# Patient Record
Sex: FEMALE | Race: WHITE | Employment: OTHER
[De-identification: names, ages, dates, MRNs, and addresses within clinical notes are randomized per-mention and may not be internally consistent; named-entity substitution may affect disease eponyms.]

---

## 2017-02-01 DIAGNOSIS — E78.5 HYPERLIPIDEMIA: ICD-10-CM

## 2017-02-01 RX ORDER — ATORVASTATIN CALCIUM 10 MG/1
TABLET, FILM COATED ORAL
Qty: 30 TABLET | Refills: 2 | Status: SHIPPED | OUTPATIENT
Start: 2017-02-01 | End: 2017-02-16 | Stop reason: SDUPTHER

## 2017-02-16 ENCOUNTER — OFFICE VISIT (OUTPATIENT)
Facility: CLINIC | Age: 67
End: 2017-02-16

## 2017-02-16 VITALS
TEMPERATURE: 98 F | DIASTOLIC BLOOD PRESSURE: 78 MMHG | SYSTOLIC BLOOD PRESSURE: 140 MMHG | BODY MASS INDEX: 30.4 KG/M2 | HEART RATE: 77 BPM | WEIGHT: 161 LBS | HEIGHT: 61 IN | OXYGEN SATURATION: 98 %

## 2017-02-16 DIAGNOSIS — Z23 NEED FOR PNEUMOCOCCAL VACCINATION: ICD-10-CM

## 2017-02-16 DIAGNOSIS — E78.00 PURE HYPERCHOLESTEROLEMIA: ICD-10-CM

## 2017-02-16 DIAGNOSIS — E78.5 HYPERLIPIDEMIA, UNSPECIFIED HYPERLIPIDEMIA TYPE: ICD-10-CM

## 2017-02-16 DIAGNOSIS — Z23 NEED FOR SHINGLES VACCINE: ICD-10-CM

## 2017-02-16 DIAGNOSIS — H66.002 ACUTE SUPPURATIVE OTITIS MEDIA OF LEFT EAR WITHOUT SPONTANEOUS RUPTURE OF TYMPANIC MEMBRANE, RECURRENCE NOT SPECIFIED: Primary | ICD-10-CM

## 2017-02-16 PROCEDURE — 1123F ACP DISCUSS/DSCN MKR DOCD: CPT | Performed by: NURSE PRACTITIONER

## 2017-02-16 PROCEDURE — 1090F PRES/ABSN URINE INCON ASSESS: CPT | Performed by: NURSE PRACTITIONER

## 2017-02-16 PROCEDURE — G0009 ADMIN PNEUMOCOCCAL VACCINE: HCPCS | Performed by: NURSE PRACTITIONER

## 2017-02-16 PROCEDURE — 4040F PNEUMOC VAC/ADMIN/RCVD: CPT | Performed by: NURSE PRACTITIONER

## 2017-02-16 PROCEDURE — 99214 OFFICE O/P EST MOD 30 MIN: CPT | Performed by: NURSE PRACTITIONER

## 2017-02-16 PROCEDURE — G8400 PT W/DXA NO RESULTS DOC: HCPCS | Performed by: NURSE PRACTITIONER

## 2017-02-16 PROCEDURE — 90670 PCV13 VACCINE IM: CPT | Performed by: NURSE PRACTITIONER

## 2017-02-16 PROCEDURE — G8599 NO ASA/ANTIPLAT THER USE RNG: HCPCS | Performed by: NURSE PRACTITIONER

## 2017-02-16 PROCEDURE — G8484 FLU IMMUNIZE NO ADMIN: HCPCS | Performed by: NURSE PRACTITIONER

## 2017-02-16 PROCEDURE — G8427 DOCREV CUR MEDS BY ELIG CLIN: HCPCS | Performed by: NURSE PRACTITIONER

## 2017-02-16 PROCEDURE — G8417 CALC BMI ABV UP PARAM F/U: HCPCS | Performed by: NURSE PRACTITIONER

## 2017-02-16 PROCEDURE — 3014F SCREEN MAMMO DOC REV: CPT | Performed by: NURSE PRACTITIONER

## 2017-02-16 PROCEDURE — 3017F COLORECTAL CA SCREEN DOC REV: CPT | Performed by: NURSE PRACTITIONER

## 2017-02-16 PROCEDURE — 1036F TOBACCO NON-USER: CPT | Performed by: NURSE PRACTITIONER

## 2017-02-16 RX ORDER — AMOXICILLIN 500 MG/1
500 CAPSULE ORAL 3 TIMES DAILY
Qty: 30 CAPSULE | Refills: 0 | Status: SHIPPED | OUTPATIENT
Start: 2017-02-16 | End: 2017-04-07 | Stop reason: SDUPTHER

## 2017-02-16 RX ORDER — ATORVASTATIN CALCIUM 10 MG/1
TABLET, FILM COATED ORAL
Qty: 90 TABLET | Refills: 1 | Status: SHIPPED | OUTPATIENT
Start: 2017-02-16 | End: 2017-11-03 | Stop reason: SDUPTHER

## 2017-02-16 RX ORDER — ATORVASTATIN CALCIUM 10 MG/1
10 TABLET, FILM COATED ORAL DAILY
Qty: 90 TABLET | Refills: 0 | Status: CANCELLED | OUTPATIENT
Start: 2017-02-16 | End: 2017-05-17

## 2017-02-16 ASSESSMENT — ENCOUNTER SYMPTOMS
RHINORRHEA: 0
SORE THROAT: 0
COUGH: 1
SINUS PRESSURE: 1

## 2017-03-23 RX ORDER — OMEPRAZOLE 20 MG/1
CAPSULE, DELAYED RELEASE ORAL
Qty: 90 CAPSULE | Refills: 2 | Status: SHIPPED | OUTPATIENT
Start: 2017-03-23 | End: 2017-12-21 | Stop reason: SDUPTHER

## 2017-05-09 RX ORDER — MESALAMINE 375 MG/1
CAPSULE, EXTENDED RELEASE ORAL
Qty: 120 CAPSULE | Refills: 0 | Status: SHIPPED | OUTPATIENT
Start: 2017-05-09 | End: 2017-05-24 | Stop reason: SDUPTHER

## 2017-05-15 ENCOUNTER — HOSPITAL ENCOUNTER (OUTPATIENT)
Age: 67
Setting detail: SPECIMEN
Discharge: HOME | End: 2017-05-15
Payer: MEDICARE

## 2017-05-15 DIAGNOSIS — E78.5 HYPERLIPIDEMIA, UNSPECIFIED HYPERLIPIDEMIA TYPE: ICD-10-CM

## 2017-05-15 DIAGNOSIS — R53.83 FATIGUE, UNSPECIFIED TYPE: ICD-10-CM

## 2017-05-15 LAB
ALBUMIN SERPL-MCNC: 4 G/DL (ref 3.5–5.2)
ALBUMIN/GLOBULIN RATIO: 1.3 (ref 1–2.5)
ALP BLD-CCNC: 99 U/L (ref 35–104)
ALT SERPL-CCNC: 17 U/L (ref 5–33)
ANION GAP SERPL CALCULATED.3IONS-SCNC: 11 MMOL/L (ref 9–17)
AST SERPL-CCNC: 18 U/L
BILIRUB SERPL-MCNC: 0.55 MG/DL (ref 0.3–1.2)
BUN BLDV-MCNC: 18 MG/DL (ref 8–23)
BUN/CREAT BLD: ABNORMAL (ref 9–20)
CALCIUM SERPL-MCNC: 9.1 MG/DL (ref 8.6–10.4)
CHLORIDE BLD-SCNC: 104 MMOL/L (ref 98–107)
CHOLESTEROL/HDL RATIO: 2.7
CHOLESTEROL: 175 MG/DL
CO2: 25 MMOL/L (ref 20–31)
CREAT SERPL-MCNC: 0.62 MG/DL (ref 0.5–0.9)
GFR AFRICAN AMERICAN: >60 ML/MIN
GFR NON-AFRICAN AMERICAN: >60 ML/MIN
GFR SERPL CREATININE-BSD FRML MDRD: ABNORMAL ML/MIN/{1.73_M2}
GFR SERPL CREATININE-BSD FRML MDRD: ABNORMAL ML/MIN/{1.73_M2}
GLUCOSE BLD-MCNC: 111 MG/DL (ref 70–99)
HCT VFR BLD CALC: 44.4 % (ref 36–46)
HDLC SERPL-MCNC: 64 MG/DL
HEMOGLOBIN: 14.7 G/DL (ref 12–16)
LDL CHOLESTEROL: 90 MG/DL (ref 0–130)
MCH RBC QN AUTO: 29 PG (ref 26–34)
MCHC RBC AUTO-ENTMCNC: 33.1 G/DL (ref 31–37)
MCV RBC AUTO: 87.4 FL (ref 80–100)
PDW BLD-RTO: 14.1 % (ref 12.5–15.4)
PLATELET # BLD: 248 K/UL (ref 140–450)
PMV BLD AUTO: 9 FL (ref 6–12)
POTASSIUM SERPL-SCNC: 4.3 MMOL/L (ref 3.7–5.3)
RBC # BLD: 5.08 M/UL (ref 4–5.2)
SODIUM BLD-SCNC: 140 MMOL/L (ref 135–144)
THYROXINE, FREE: 1.04 NG/DL (ref 0.93–1.7)
TOTAL PROTEIN: 7.1 G/DL (ref 6.4–8.3)
TRIGL SERPL-MCNC: 106 MG/DL
TSH SERPL DL<=0.05 MIU/L-ACNC: 1.83 MIU/L (ref 0.3–5)
VLDLC SERPL CALC-MCNC: NORMAL MG/DL (ref 1–30)
WBC # BLD: 6.7 K/UL (ref 3.5–11)

## 2017-05-24 ENCOUNTER — OFFICE VISIT (OUTPATIENT)
Dept: GASTROENTEROLOGY | Age: 67
End: 2017-05-24
Payer: MEDICARE

## 2017-05-24 VITALS
HEIGHT: 61 IN | TEMPERATURE: 98 F | BODY MASS INDEX: 30.15 KG/M2 | OXYGEN SATURATION: 98 % | DIASTOLIC BLOOD PRESSURE: 82 MMHG | HEART RATE: 64 BPM | SYSTOLIC BLOOD PRESSURE: 150 MMHG | WEIGHT: 159.7 LBS

## 2017-05-24 DIAGNOSIS — K21.9 GASTROESOPHAGEAL REFLUX DISEASE WITHOUT ESOPHAGITIS: ICD-10-CM

## 2017-05-24 DIAGNOSIS — K51.90 ULCERATIVE COLITIS WITHOUT COMPLICATIONS, UNSPECIFIED LOCATION (HCC): Primary | ICD-10-CM

## 2017-05-24 PROCEDURE — 1123F ACP DISCUSS/DSCN MKR DOCD: CPT | Performed by: INTERNAL MEDICINE

## 2017-05-24 PROCEDURE — G8400 PT W/DXA NO RESULTS DOC: HCPCS | Performed by: INTERNAL MEDICINE

## 2017-05-24 PROCEDURE — 1036F TOBACCO NON-USER: CPT | Performed by: INTERNAL MEDICINE

## 2017-05-24 PROCEDURE — G8427 DOCREV CUR MEDS BY ELIG CLIN: HCPCS | Performed by: INTERNAL MEDICINE

## 2017-05-24 PROCEDURE — 4040F PNEUMOC VAC/ADMIN/RCVD: CPT | Performed by: INTERNAL MEDICINE

## 2017-05-24 PROCEDURE — 3017F COLORECTAL CA SCREEN DOC REV: CPT | Performed by: INTERNAL MEDICINE

## 2017-05-24 PROCEDURE — 3014F SCREEN MAMMO DOC REV: CPT | Performed by: INTERNAL MEDICINE

## 2017-05-24 PROCEDURE — 99214 OFFICE O/P EST MOD 30 MIN: CPT | Performed by: INTERNAL MEDICINE

## 2017-05-24 PROCEDURE — 1090F PRES/ABSN URINE INCON ASSESS: CPT | Performed by: INTERNAL MEDICINE

## 2017-05-24 PROCEDURE — G8599 NO ASA/ANTIPLAT THER USE RNG: HCPCS | Performed by: INTERNAL MEDICINE

## 2017-05-24 PROCEDURE — G8417 CALC BMI ABV UP PARAM F/U: HCPCS | Performed by: INTERNAL MEDICINE

## 2017-05-24 RX ORDER — MESALAMINE 0.38 G/1
CAPSULE, EXTENDED RELEASE ORAL
Qty: 360 CAPSULE | Refills: 3 | Status: SHIPPED | OUTPATIENT
Start: 2017-05-24 | End: 2018-06-11 | Stop reason: SDUPTHER

## 2017-05-24 ASSESSMENT — ENCOUNTER SYMPTOMS
GASTROINTESTINAL NEGATIVE: 1
CONSTIPATION: 0
DIARRHEA: 0
SHORTNESS OF BREATH: 0
SORE THROAT: 0
ABDOMINAL PAIN: 0
COUGH: 0
SINUS PRESSURE: 0
ABDOMINAL DISTENTION: 0
VOICE CHANGE: 0
BLOOD IN STOOL: 0
ANAL BLEEDING: 0
NAUSEA: 0
WHEEZING: 0
CHOKING: 0
FACIAL SWELLING: 0
RESPIRATORY NEGATIVE: 1
BACK PAIN: 0
TROUBLE SWALLOWING: 0
RHINORRHEA: 0
COLOR CHANGE: 0
VOMITING: 0
RECTAL PAIN: 0

## 2017-06-28 ENCOUNTER — OFFICE VISIT (OUTPATIENT)
Dept: PODIATRY | Age: 67
End: 2017-06-28
Payer: MEDICARE

## 2017-06-28 VITALS
WEIGHT: 150 LBS | SYSTOLIC BLOOD PRESSURE: 128 MMHG | BODY MASS INDEX: 27.6 KG/M2 | HEIGHT: 62 IN | DIASTOLIC BLOOD PRESSURE: 86 MMHG | HEART RATE: 83 BPM

## 2017-06-28 DIAGNOSIS — R26.2 PATIENT UNABLE TO WALK 150 FEET: Primary | ICD-10-CM

## 2017-06-28 DIAGNOSIS — M79.671 PAIN IN BOTH FEET: ICD-10-CM

## 2017-06-28 DIAGNOSIS — M79.672 PAIN IN BOTH FEET: ICD-10-CM

## 2017-06-28 DIAGNOSIS — M62.89 POSTURAL FATIGUE: ICD-10-CM

## 2017-06-28 PROCEDURE — G8427 DOCREV CUR MEDS BY ELIG CLIN: HCPCS | Performed by: PODIATRIST

## 2017-06-28 PROCEDURE — 1123F ACP DISCUSS/DSCN MKR DOCD: CPT | Performed by: PODIATRIST

## 2017-06-28 PROCEDURE — 3017F COLORECTAL CA SCREEN DOC REV: CPT | Performed by: PODIATRIST

## 2017-06-28 PROCEDURE — G8599 NO ASA/ANTIPLAT THER USE RNG: HCPCS | Performed by: PODIATRIST

## 2017-06-28 PROCEDURE — G8400 PT W/DXA NO RESULTS DOC: HCPCS | Performed by: PODIATRIST

## 2017-06-28 PROCEDURE — G8417 CALC BMI ABV UP PARAM F/U: HCPCS | Performed by: PODIATRIST

## 2017-06-28 PROCEDURE — 1036F TOBACCO NON-USER: CPT | Performed by: PODIATRIST

## 2017-06-28 PROCEDURE — 99203 OFFICE O/P NEW LOW 30 MIN: CPT | Performed by: PODIATRIST

## 2017-06-28 PROCEDURE — 1090F PRES/ABSN URINE INCON ASSESS: CPT | Performed by: PODIATRIST

## 2017-06-28 PROCEDURE — 4040F PNEUMOC VAC/ADMIN/RCVD: CPT | Performed by: PODIATRIST

## 2017-06-28 PROCEDURE — 3014F SCREEN MAMMO DOC REV: CPT | Performed by: PODIATRIST

## 2017-06-28 ASSESSMENT — ENCOUNTER SYMPTOMS
CONSTIPATION: 0
DIARRHEA: 0
NAUSEA: 0
VOMITING: 0
COLOR CHANGE: 0

## 2017-12-04 PROBLEM — I10 HYPERTENSION, ESSENTIAL, BENIGN: Status: ACTIVE | Noted: 2017-12-04

## 2017-12-21 RX ORDER — OMEPRAZOLE 20 MG/1
CAPSULE, DELAYED RELEASE ORAL
Qty: 90 CAPSULE | Refills: 2 | Status: SHIPPED | OUTPATIENT
Start: 2017-12-21 | End: 2018-09-04 | Stop reason: SDUPTHER

## 2018-01-05 ENCOUNTER — HOSPITAL ENCOUNTER (OUTPATIENT)
Dept: WOMENS IMAGING | Age: 68
Discharge: HOME | End: 2018-01-05
Payer: MEDICARE

## 2018-01-05 DIAGNOSIS — Z12.31 ENCOUNTER FOR SCREENING MAMMOGRAM FOR BREAST CANCER: ICD-10-CM

## 2018-01-05 PROCEDURE — 77063 BREAST TOMOSYNTHESIS BI: CPT

## 2018-03-20 ENCOUNTER — HOSPITAL ENCOUNTER (OUTPATIENT)
Age: 68
Setting detail: SPECIMEN
Discharge: HOME | End: 2018-03-20
Payer: MEDICARE

## 2018-03-20 DIAGNOSIS — E55.9 VITAMIN D DEFICIENCY: ICD-10-CM

## 2018-03-20 DIAGNOSIS — E78.5 HYPERLIPIDEMIA, UNSPECIFIED HYPERLIPIDEMIA TYPE: ICD-10-CM

## 2018-03-20 DIAGNOSIS — I10 HYPERTENSION, ESSENTIAL, BENIGN: ICD-10-CM

## 2018-03-20 LAB
ALBUMIN SERPL-MCNC: 3.9 G/DL (ref 3.5–5.2)
ALBUMIN/GLOBULIN RATIO: 1.4 (ref 1–2.5)
ALP BLD-CCNC: 104 U/L (ref 35–104)
ALT SERPL-CCNC: 19 U/L (ref 5–33)
ANION GAP SERPL CALCULATED.3IONS-SCNC: 14 MMOL/L (ref 9–17)
AST SERPL-CCNC: 17 U/L
BILIRUB SERPL-MCNC: 0.67 MG/DL (ref 0.3–1.2)
BUN BLDV-MCNC: 17 MG/DL (ref 8–23)
BUN/CREAT BLD: ABNORMAL (ref 9–20)
CALCIUM SERPL-MCNC: 9 MG/DL (ref 8.6–10.4)
CHLORIDE BLD-SCNC: 106 MMOL/L (ref 98–107)
CHOLESTEROL/HDL RATIO: 2.6
CHOLESTEROL: 139 MG/DL
CO2: 24 MMOL/L (ref 20–31)
CREAT SERPL-MCNC: 0.63 MG/DL (ref 0.5–0.9)
GFR AFRICAN AMERICAN: >60 ML/MIN
GFR NON-AFRICAN AMERICAN: >60 ML/MIN
GFR SERPL CREATININE-BSD FRML MDRD: ABNORMAL ML/MIN/{1.73_M2}
GFR SERPL CREATININE-BSD FRML MDRD: ABNORMAL ML/MIN/{1.73_M2}
GLUCOSE BLD-MCNC: 140 MG/DL (ref 70–99)
HCT VFR BLD CALC: 43.9 % (ref 36.3–47.1)
HDLC SERPL-MCNC: 54 MG/DL
HEMOGLOBIN: 14.2 G/DL (ref 11.9–15.1)
LDL CHOLESTEROL: 63 MG/DL (ref 0–130)
MCH RBC QN AUTO: 28.5 PG (ref 25.2–33.5)
MCHC RBC AUTO-ENTMCNC: 32.3 G/DL (ref 28.4–34.8)
MCV RBC AUTO: 88 FL (ref 82.6–102.9)
NRBC AUTOMATED: 0 PER 100 WBC
PDW BLD-RTO: 12.4 % (ref 11.8–14.4)
PLATELET # BLD: 225 K/UL (ref 138–453)
PMV BLD AUTO: 11.5 FL (ref 8.1–13.5)
POTASSIUM SERPL-SCNC: 4.2 MMOL/L (ref 3.7–5.3)
RBC # BLD: 4.99 M/UL (ref 3.95–5.11)
SODIUM BLD-SCNC: 144 MMOL/L (ref 135–144)
TOTAL PROTEIN: 6.6 G/DL (ref 6.4–8.3)
TRIGL SERPL-MCNC: 111 MG/DL
VITAMIN D 25-HYDROXY: 15.9 NG/ML (ref 30–100)
VLDLC SERPL CALC-MCNC: NORMAL MG/DL (ref 1–30)
WBC # BLD: 5.7 K/UL (ref 3.5–11.3)

## 2018-03-22 ENCOUNTER — HOSPITAL ENCOUNTER (OUTPATIENT)
Dept: SLEEP CENTER | Age: 68
Discharge: HOME | End: 2018-03-24
Payer: MEDICARE

## 2018-03-22 DIAGNOSIS — R05.9 COUGH: ICD-10-CM

## 2018-03-22 DIAGNOSIS — R06.83 SNORING: ICD-10-CM

## 2018-03-22 PROCEDURE — 95810 POLYSOM 6/> YRS 4/> PARAM: CPT

## 2018-03-22 ASSESSMENT — SLEEP AND FATIGUE QUESTIONNAIRES
HOW LIKELY ARE YOU TO NOD OFF OR FALL ASLEEP WHILE SITTING QUIETLY AFTER LUNCH WITHOUT ALCOHOL: 1
HOW LIKELY ARE YOU TO NOD OFF OR FALL ASLEEP WHILE WATCHING TV: 1
HOW LIKELY ARE YOU TO NOD OFF OR FALL ASLEEP WHILE SITTING AND READING: 1
HOW LIKELY ARE YOU TO NOD OFF OR FALL ASLEEP WHILE SITTING AND TALKING TO SOMEONE: 0
HOW LIKELY ARE YOU TO NOD OFF OR FALL ASLEEP WHEN YOU ARE A PASSENGER IN A CAR FOR AN HOUR WITHOUT A BREAK: 2
ESS TOTAL SCORE: 6
HOW LIKELY ARE YOU TO NOD OFF OR FALL ASLEEP WHILE SITTING INACTIVE IN A PUBLIC PLACE: 0
HOW LIKELY ARE YOU TO NOD OFF OR FALL ASLEEP IN A CAR, WHILE STOPPED FOR A FEW MINUTES IN TRAFFIC: 0
HOW LIKELY ARE YOU TO NOD OFF OR FALL ASLEEP WHILE LYING DOWN TO REST IN THE AFTERNOON WHEN CIRCUMSTANCES PERMIT: 1

## 2018-03-23 VITALS
DIASTOLIC BLOOD PRESSURE: 68 MMHG | HEART RATE: 73 BPM | HEIGHT: 61 IN | WEIGHT: 164 LBS | RESPIRATION RATE: 16 BRPM | SYSTOLIC BLOOD PRESSURE: 126 MMHG | BODY MASS INDEX: 30.96 KG/M2

## 2018-04-18 ENCOUNTER — TELEPHONE (OUTPATIENT)
Dept: GASTROENTEROLOGY | Age: 68
End: 2018-04-18

## 2018-05-29 ENCOUNTER — OFFICE VISIT (OUTPATIENT)
Dept: GASTROENTEROLOGY | Age: 68
End: 2018-05-29
Payer: MEDICARE

## 2018-05-29 VITALS
WEIGHT: 167.1 LBS | DIASTOLIC BLOOD PRESSURE: 73 MMHG | SYSTOLIC BLOOD PRESSURE: 121 MMHG | BODY MASS INDEX: 30.75 KG/M2 | OXYGEN SATURATION: 97 % | HEART RATE: 70 BPM | HEIGHT: 62 IN

## 2018-05-29 DIAGNOSIS — K51.90 ULCERATIVE COLITIS WITHOUT COMPLICATIONS, UNSPECIFIED LOCATION (HCC): ICD-10-CM

## 2018-05-29 DIAGNOSIS — K21.9 GASTROESOPHAGEAL REFLUX DISEASE WITHOUT ESOPHAGITIS: Primary | ICD-10-CM

## 2018-05-29 PROCEDURE — 1090F PRES/ABSN URINE INCON ASSESS: CPT | Performed by: INTERNAL MEDICINE

## 2018-05-29 PROCEDURE — G8400 PT W/DXA NO RESULTS DOC: HCPCS | Performed by: INTERNAL MEDICINE

## 2018-05-29 PROCEDURE — G8599 NO ASA/ANTIPLAT THER USE RNG: HCPCS | Performed by: INTERNAL MEDICINE

## 2018-05-29 PROCEDURE — G8427 DOCREV CUR MEDS BY ELIG CLIN: HCPCS | Performed by: INTERNAL MEDICINE

## 2018-05-29 PROCEDURE — 1123F ACP DISCUSS/DSCN MKR DOCD: CPT | Performed by: INTERNAL MEDICINE

## 2018-05-29 PROCEDURE — 3017F COLORECTAL CA SCREEN DOC REV: CPT | Performed by: INTERNAL MEDICINE

## 2018-05-29 PROCEDURE — 4040F PNEUMOC VAC/ADMIN/RCVD: CPT | Performed by: INTERNAL MEDICINE

## 2018-05-29 PROCEDURE — G8417 CALC BMI ABV UP PARAM F/U: HCPCS | Performed by: INTERNAL MEDICINE

## 2018-05-29 PROCEDURE — 99215 OFFICE O/P EST HI 40 MIN: CPT | Performed by: INTERNAL MEDICINE

## 2018-05-29 PROCEDURE — 1036F TOBACCO NON-USER: CPT | Performed by: INTERNAL MEDICINE

## 2018-05-29 ASSESSMENT — ENCOUNTER SYMPTOMS
COLOR CHANGE: 0
CHOKING: 0
ANAL BLEEDING: 0
WHEEZING: 0
BACK PAIN: 0
RESPIRATORY NEGATIVE: 1
RHINORRHEA: 0
FACIAL SWELLING: 0
ABDOMINAL PAIN: 0
DIARRHEA: 0
CONSTIPATION: 0
VOICE CHANGE: 0
ABDOMINAL DISTENTION: 0
VOMITING: 0
RECTAL PAIN: 0
COUGH: 0
GASTROINTESTINAL NEGATIVE: 1
SINUS PAIN: 0
TROUBLE SWALLOWING: 0
SINUS PRESSURE: 0
SHORTNESS OF BREATH: 0
BLOOD IN STOOL: 0
NAUSEA: 0
SORE THROAT: 0

## 2018-06-14 ENCOUNTER — TELEPHONE (OUTPATIENT)
Dept: GASTROENTEROLOGY | Age: 68
End: 2018-06-14

## 2018-06-19 RX ORDER — MESALAMINE 375 MG/1
CAPSULE, EXTENDED RELEASE ORAL
Qty: 360 CAPSULE | Refills: 0 | Status: SHIPPED | OUTPATIENT
Start: 2018-06-19 | End: 2018-09-15 | Stop reason: SDUPTHER

## 2018-06-22 ENCOUNTER — HOSPITAL ENCOUNTER (OUTPATIENT)
Age: 68
Setting detail: SPECIMEN
Discharge: HOME | End: 2018-06-22
Payer: MEDICARE

## 2018-06-22 ENCOUNTER — HOSPITAL ENCOUNTER (OUTPATIENT)
Facility: CLINIC | Age: 68
Discharge: HOME | End: 2018-06-24
Payer: MEDICARE

## 2018-06-22 ENCOUNTER — HOSPITAL ENCOUNTER (OUTPATIENT)
Dept: GENERAL RADIOLOGY | Facility: CLINIC | Age: 68
Discharge: HOME | End: 2018-06-24
Payer: MEDICARE

## 2018-06-22 DIAGNOSIS — R05.3 PERSISTENT COUGH: ICD-10-CM

## 2018-06-22 DIAGNOSIS — R73.9 ELEVATED BLOOD SUGAR: ICD-10-CM

## 2018-06-22 DIAGNOSIS — J98.01 BRONCHOSPASM: ICD-10-CM

## 2018-06-22 DIAGNOSIS — J20.9 ACUTE BRONCHITIS, UNSPECIFIED ORGANISM: ICD-10-CM

## 2018-06-22 LAB
ESTIMATED AVERAGE GLUCOSE: 140 MG/DL
HBA1C MFR BLD: 6.5 % (ref 4–6)

## 2018-06-22 PROCEDURE — 71046 X-RAY EXAM CHEST 2 VIEWS: CPT

## 2018-07-10 ENCOUNTER — OFFICE VISIT (OUTPATIENT)
Dept: PULMONOLOGY | Age: 68
End: 2018-07-10
Payer: MEDICARE

## 2018-07-10 VITALS
WEIGHT: 166 LBS | HEART RATE: 88 BPM | OXYGEN SATURATION: 97 % | RESPIRATION RATE: 16 BRPM | HEIGHT: 61 IN | BODY MASS INDEX: 31.34 KG/M2 | DIASTOLIC BLOOD PRESSURE: 80 MMHG | SYSTOLIC BLOOD PRESSURE: 122 MMHG

## 2018-07-10 DIAGNOSIS — J98.11 ATELECTASIS: ICD-10-CM

## 2018-07-10 DIAGNOSIS — K21.9 GASTROESOPHAGEAL REFLUX DISEASE, ESOPHAGITIS PRESENCE NOT SPECIFIED: ICD-10-CM

## 2018-07-10 DIAGNOSIS — R05.3 CHRONIC COUGH: ICD-10-CM

## 2018-07-10 DIAGNOSIS — J45.30 MILD PERSISTENT ASTHMA, UNSPECIFIED WHETHER COMPLICATED: Primary | ICD-10-CM

## 2018-07-10 DIAGNOSIS — J30.1 CHRONIC SEASONAL ALLERGIC RHINITIS DUE TO POLLEN: ICD-10-CM

## 2018-07-10 PROCEDURE — 1036F TOBACCO NON-USER: CPT | Performed by: INTERNAL MEDICINE

## 2018-07-10 PROCEDURE — 1090F PRES/ABSN URINE INCON ASSESS: CPT | Performed by: INTERNAL MEDICINE

## 2018-07-10 PROCEDURE — G8427 DOCREV CUR MEDS BY ELIG CLIN: HCPCS | Performed by: INTERNAL MEDICINE

## 2018-07-10 PROCEDURE — G8400 PT W/DXA NO RESULTS DOC: HCPCS | Performed by: INTERNAL MEDICINE

## 2018-07-10 PROCEDURE — G8417 CALC BMI ABV UP PARAM F/U: HCPCS | Performed by: INTERNAL MEDICINE

## 2018-07-10 PROCEDURE — 99204 OFFICE O/P NEW MOD 45 MIN: CPT | Performed by: INTERNAL MEDICINE

## 2018-07-10 PROCEDURE — G8599 NO ASA/ANTIPLAT THER USE RNG: HCPCS | Performed by: INTERNAL MEDICINE

## 2018-07-10 PROCEDURE — 1123F ACP DISCUSS/DSCN MKR DOCD: CPT | Performed by: INTERNAL MEDICINE

## 2018-07-10 PROCEDURE — 3017F COLORECTAL CA SCREEN DOC REV: CPT | Performed by: INTERNAL MEDICINE

## 2018-07-10 PROCEDURE — 4040F PNEUMOC VAC/ADMIN/RCVD: CPT | Performed by: INTERNAL MEDICINE

## 2018-07-10 RX ORDER — FLUTICASONE PROPIONATE 50 MCG
2 SPRAY, SUSPENSION (ML) NASAL DAILY
Qty: 1 BOTTLE | Refills: 5 | Status: SHIPPED | OUTPATIENT
Start: 2018-07-10 | End: 2019-06-11 | Stop reason: SDUPTHER

## 2018-07-10 RX ORDER — FLUTICASONE PROPIONATE 110 UG/1
1 AEROSOL, METERED RESPIRATORY (INHALATION) 2 TIMES DAILY
Qty: 1 INHALER | Refills: 5 | Status: SHIPPED | OUTPATIENT
Start: 2018-07-10 | End: 2019-03-12 | Stop reason: SDUPTHER

## 2018-07-10 NOTE — PROGRESS NOTES
capsule 0    atorvastatin (LIPITOR) 10 MG tablet TAKE ONE TABLET BY MOUTH ONE TIME DAILY 90 tablet 0    metoprolol tartrate (LOPRESSOR) 25 MG tablet Take 50 mg by mouth daily       omeprazole (PRILOSEC) 20 MG delayed release capsule TAKE 1 CAPSULE BY MOUTH EVERY DAY 90 capsule 2    amLODIPine (NORVASC) 2.5 MG tablet Take 1 tablet by mouth daily 30 tablet 3    ondansetron (ZOFRAN-ODT) 4 MG disintegrating tablet DISSOLVE 1 TABLET IN MOUTH EVERY EIGHT HOURS AS NEEDED FOR NAUSEA AND VOMITING 30 tablet 0    meclizine (ANTIVERT) 25 MG tablet TAKE 1 TABLET BY MOUTH THREE TIMES A DAY AS NEEDED FOR DIZZINESS OR NAUSEA 30 tablet 0    Handicap Placard MISC Duration 5 years  on 2022 1 each 0    fluticasone (FLONASE) 50 MCG/ACT nasal spray 1 spray by Nasal route daily 1 Bottle 3    mometasone (NASONEX) 50 MCG/ACT nasal spray 2 sprays by Nasal route daily. 1 Inhaler 3    Loratadine (CLARITIN PO) Take by mouth daily       [DISCONTINUED] predniSONE (DELTASONE) 10 MG tablet 3 tabs for 3 days, 2 tabs for 3 days, 1 tab for 3 days 18 tablet 0    [DISCONTINUED] albuterol sulfate HFA (PROAIR HFA) 108 (90 Base) MCG/ACT inhaler Inhale 2 puffs into the lungs every 6 hours as needed for Wheezing 1 Inhaler 3     No facility-administered encounter medications on file as of 7/10/2018. Social History:   TOBACCO:   reports that she has never smoked. She has never used smokeless tobacco.  ETOH:   reports that she does not drink alcohol.   OCCUPATION:  Retired     Family History:   Family History   Problem Relation Age of Onset    Heart Disease Mother     Diabetes Mother    Carly Angel Luis Mother         brain tumor    Heart Disease Father     Diabetes Father     Diabetes Sister     Thyroid Disease Sister     Diabetes Brother     Diabetes Brother     Thyroid Disease Brother     Ulcerative Colitis Grandchild        Immunizations:    Immunization History   Administered Date(s) Administered    Influenza Virus Vaccine 12/08/2014, 10/19/2015    Influenza, High Dose 12/04/2017    Pneumococcal 13-valent Conjugate ([id number]) 02/16/2017         REVIEW OF SYSTEMS:  CONSTITUTIONAL:  negative for  fevers, chills, sweats, fatigue, malaise, anorexia and weight loss  EYES:  negative for  double vision, blurred vision, dry eyes, eye discharge, visual disturbance, irritation, redness and icterus  HEENT:  negative for  ear drainage, nasal congestion, epistaxis, snoring, sore mouth, sore throat, hoarseness and voice change  RESPIRATORY:  positive for  dry cough and wheezing  negative for  cough with sputum, dyspnea, hemoptysis, chest pain, pleuritic pain and cyanosis  CARDIOVASCULAR:  negative for  chest pain, dyspnea, palpitations, orthopnea, PND, exertional chest pressure/discomfort, fatigue, early saiety, edema, syncope  GASTROINTESTINAL:  negative for nausea, vomiting, change in bowel habits, diarrhea, constipation, abdominal pain, abdominal mass, abdominal distention, dysphagia, reflux, odynophagia, hematemesis and hemtochezia  GENITOURINARY:  negative for frequency, dysuria, nocturia, urinary incontinence, hesitancy, decreased stream and hematuria  HEMATOLOGIC/LYMPHATIC:  negative for easy bruising, bleeding, lymphadenopathy and petechiae  ALLERGIC/IMMUNOLOGIC:  Positive for hayfever, negative for recurrent infections, urticaria, angioedema, anaphylaxis and drug reactions  ENDOCRINE:  negative for heat intolerance, cold intolerance, tremor, weight changes and change in bowel habits  MUSCULOSKELETAL:  negative for  myalgias, arthralgias, joint swelling, stiff joints and decreased range of motion  NEUROLOGICAL:  negative for headaches, dizziness, seizures, memory problems, speech problems, visual disturbance, coordination problems, tremor, dysphagia, numbness, syncope and tingling  BEHAVIOR/PSYCH:  negative  SLEEP: Negative for snoring, choking, gasping, periods of not breathing, excessive daytime sleepiness, falling asleep while reading, watching television, disrupted sleep, naps        Physical Exam:    Vitals: /80   Pulse 88   Resp 16   Ht 5' 1\" (1.549 m)   Wt 166 lb (75.3 kg)   SpO2 97% Comment: room air  BMI 31.37 kg/m²   Last 3 weights: Wt Readings from Last 3 Encounters:   07/10/18 166 lb (75.3 kg)   06/22/18 166 lb 9.6 oz (75.6 kg)   06/06/18 168 lb (76.2 kg)     Body mass index is 31.37 kg/m².     Physical Examination:   General appearance - alert, well appearing, and in no distress, normal appearing weight and acyanotic, in no respiratory distress  Mental status - alert, oriented to person, place, and time  Eyes - pupils equal and reactive, extraocular eye movements intact, sclera anicteric  Ears - right ear normal, left ear normal  Nose - normal nontender sinuses, mucosal pallor and sinuses normal and nontender positive nasal pallor and nasal secretions dry  Mouth - mucous membranes moist, pharynx normal without lesions  Neck - supple, no significant adenopathy  Chest - clear to auscultation, no wheezes, rales or rhonchi, symmetric air entry  Heart - normal rate, regular rhythm, normal S1, S2, no murmurs, rubs, clicks or gallops  Abdomen - soft, nontender, nondistended, no masses or organomegaly  Neurological - alert, oriented, normal speech, no focal findings or movement disorder noted}  Extremities - peripheral pulses normal, no pedal edema, no clubbing or cyanosis  Skin - normal coloration and turgor, no rashes, no suspicious skin lesions noted       LABS:    CBC:   WBC   Date Value Ref Range Status   03/20/2018 5.7 3.5 - 11.3 k/uL Final   05/15/2017 6.7 3.5 - 11.0 k/uL Final   01/06/2016 6.4 3.5 - 11.0 k/uL Final     Hemoglobin   Date Value Ref Range Status   03/20/2018 14.2 11.9 - 15.1 g/dL Final   05/15/2017 14.7 12.0 - 16.0 g/dL Final   01/06/2016 14.2 12.0 - 16.0 g/dL Final     Platelet Count   Date Value Ref Range Status   11/30/2011 288 140 - 450 k/uL Final     Platelets   Date Value Ref Range Status 03/20/2018 225 138 - 453 k/uL Final   05/15/2017 248 140 - 450 k/uL Final   01/06/2016 244 150 - 450 k/uL Final     BMP:   Sodium   Date Value Ref Range Status   03/20/2018 144 135 - 144 mmol/L Final   05/15/2017 140 135 - 144 mmol/L Final   06/01/2014 139 136 - 145 mmol/L Final     Potassium   Date Value Ref Range Status   03/20/2018 4.2 3.7 - 5.3 mmol/L Final   05/15/2017 4.3 3.7 - 5.3 mmol/L Final   06/01/2014 3.8 3.5 - 5.1 mmol/L Final     Chloride   Date Value Ref Range Status   03/20/2018 106 98 - 107 mmol/L Final   05/15/2017 104 98 - 107 mmol/L Final   06/01/2014 104 98 - 107 mmol/L Final     CO2   Date Value Ref Range Status   03/20/2018 24 20 - 31 mmol/L Final   05/15/2017 25 20 - 31 mmol/L Final   06/01/2014 29 20 - 31 mmol/L Final     BUN   Date Value Ref Range Status   03/20/2018 17 8 - 23 mg/dL Final   05/15/2017 18 8 - 23 mg/dL Final   01/06/2016 18 8 - 23 mg/dL Final     Comment:     Performed at [address]   [phone]       CREATININE   Date Value Ref Range Status   03/20/2018 0.63 0.50 - 0.90 mg/dL Final   05/15/2017 0.62 0.50 - 0.90 mg/dL Final   01/06/2016 0.71 0.50 - 0.90 mg/dL Final     Glucose   Date Value Ref Range Status   03/20/2018 140 (H) 70 - 99 mg/dL Final   05/15/2017 111 (H) 70 - 99 mg/dL Final   06/09/2014 136 mg/dL Final     Comment:     ate breakfast and lunch     Hepatic:   AST   Date Value Ref Range Status   03/20/2018 17 <32 U/L Final   05/15/2017 18 <32 U/L Final   01/06/2016 17 <32 U/L Final     ALT   Date Value Ref Range Status   03/20/2018 19 5 - 33 U/L Final   05/15/2017 17 5 - 33 U/L Final   01/06/2016 19 5 - 33 U/L Final     Total Bilirubin   Date Value Ref Range Status   03/20/2018 0.67 0.3 - 1.2 mg/dL Final   05/15/2017 0.55 0.3 - 1.2 mg/dL Final   01/06/2016 0.50 0.3 - 1.2 mg/dL Final     Alkaline Phosphatase   Date Value Ref Range Status   03/20/2018 104 35 - 104 U/L Final   05/15/2017 99 35 - 104 U/L Final 01/06/2016 94 35 - 104 U/L Final     Amylase:   Amylase   Date Value Ref Range Status   12/28/2012 46 20 - 104 U/L Final     Comment:     Performed at [address]   [phone]     Lipase:   Lipase   Date Value Ref Range Status   01/06/2016 29 13 - 60 U/L Final     Comment:     Performed at [address]   [phone]       CARDIAC ENZYMES:   Troponin I   Date Value Ref Range Status   12/28/2012 <0.01 0.00 - 0.04 ng/mL Final   12/28/2012 <0.01 0.00 - 0.04 ng/mL Final   12/28/2012 <0.01 0.00 - 0.04 ng/mL Final     BNP: No results found for: BNP  Lipids:   Cholesterol   Date Value Ref Range Status   03/20/2018 139 <200 mg/dL Final     Comment:        Cholesterol Guidelines:      <200  Desirable   200-240  Borderline      >240  Undesirable          HDL   Date Value Ref Range Status   03/20/2018 54 >40 mg/dL Final     Comment:        HDL Guidelines:    <40     Undesirable   40-59    Borderline    >59     Desirable            INR: No results found for: INR  Thyroid:   TSH   Date Value Ref Range Status   05/15/2017 1.83 0.30 - 5.00 mIU/L Final     Comment:     [facility] [address] [facility], [address] [place] [address] [phone]     Urinalysis: No results found for: BACTERIA, BLOODU, CLARITYU, COLORU, [address], [address], SPECGRAV, BILIRUBINUR, NITRU, [address], LEUKOCYTESUR, GLUCOSEU  Cultures:-  -----------------------------------------------------------------    ABGs: No results found for: PHART, PO2ART, BTD2ZNX    Pulmonary Functions Testing Results:    No results found for: FEV1, FVC, CYJ2JJX, TLC, DLCO    CXR  06/22/2018 Mild atelectasis subsegmental right middle lobe and mild left basilar atelectasis slightly more prominent than before  06/03/2014 mild right middle lobe atelectasis and mild left basilar atelectasis.     CT Scans      Assessment and Plan       ICD-10-CM ICD-9-CM    1. Mild persistent asthma, unspecified whether complicated X18.60 789.69    2. Chronic cough R05 786.2 XR CHEST STANDARD (2 VW)   3. Gastroesophageal reflux disease, esophagitis presence not specified K21.9 530.81    4. Atelectasis J98.11 518.0    5. Chronic seasonal allergic rhinitis due to pollen J30.1 477.0    Impression  Her chronic cough was most likely related to cough variant asthma, she had 2 episodes of bronchitis which is most likely asthma exacerbation and also she had mild basilar atelectasis at that time and she had episode of asthmatic bronchitis/acute asthma exacerbation and chest x-ray was suggestive of that chest x-ray finding local limited more prominent than it was in 2014 as mentioned above. She  She does have history of allergic rhinitis according to her hayfever which usually last from August until late October again suggestive of allergy-induced symptoms in fall which also may contribute to her cough plus she has gases reflux disease which may contribute also    Plan and recommendation:  Start Flovent 110 mcg 1 puffs BID and depending upon the response will go to 2 puff twice a day. We'll see the response of inhaled corticosteroids for her chronic cough  Continue Flonase nasal spray  Prescriptions given  Claritin as needed  May need singulair depending upon the symptoms  Proceed with PFTs as scheduled  CXR PA and Lat view in 6 weeks for follow-up as recommended by radiology. Annual flu Vaccinations recommended  Up to date with vaccinations from [facility] an active lifestyle   Questions answered to pt's satisfaction    RTC 2 months      It was my pleasure to evaluate Sarah Miami today. Please call with questions.     Ernie Welch MD             7/10/2018, 6:13 PM

## 2018-07-23 ENCOUNTER — HOSPITAL ENCOUNTER (OUTPATIENT)
Dept: PULMONOLOGY | Age: 68
Discharge: HOME | End: 2018-07-23
Payer: MEDICARE

## 2018-07-23 DIAGNOSIS — R05.3 PERSISTENT COUGH: ICD-10-CM

## 2018-07-23 DIAGNOSIS — J98.01 BRONCHOSPASM: ICD-10-CM

## 2018-07-23 PROCEDURE — 94728 AIRWY RESIST BY OSCILLOMETRY: CPT

## 2018-07-23 PROCEDURE — 94727 GAS DIL/WSHOT DETER LNG VOL: CPT

## 2018-07-23 PROCEDURE — 94060 EVALUATION OF WHEEZING: CPT

## 2018-07-23 PROCEDURE — 94726 PLETHYSMOGRAPHY LUNG VOLUMES: CPT

## 2018-07-23 PROCEDURE — 6360000002 HC RX W HCPCS: Performed by: NURSE PRACTITIONER

## 2018-07-23 PROCEDURE — 94664 DEMO&/EVAL PT USE INHALER: CPT

## 2018-07-23 PROCEDURE — 94729 DIFFUSING CAPACITY: CPT

## 2018-07-23 RX ORDER — ALBUTEROL SULFATE 2.5 MG/3ML
2.5 SOLUTION RESPIRATORY (INHALATION) ONCE
Status: COMPLETED | OUTPATIENT
Start: 2018-07-23 | End: 2018-07-23

## 2018-07-23 RX ADMIN — ALBUTEROL SULFATE 2.5 MG: 2.5 SOLUTION RESPIRATORY (INHALATION) at 08:52

## 2018-09-06 ENCOUNTER — HOSPITAL ENCOUNTER (OUTPATIENT)
Facility: CLINIC | Age: 68
Discharge: HOME | End: 2018-09-08
Payer: MEDICARE

## 2018-09-06 ENCOUNTER — HOSPITAL ENCOUNTER (OUTPATIENT)
Dept: GENERAL RADIOLOGY | Facility: CLINIC | Age: 68
Discharge: HOME | End: 2018-09-08
Payer: MEDICARE

## 2018-09-06 DIAGNOSIS — R05.3 CHRONIC COUGH: ICD-10-CM

## 2018-09-06 PROCEDURE — 71046 X-RAY EXAM CHEST 2 VIEWS: CPT

## 2018-09-11 ENCOUNTER — OFFICE VISIT (OUTPATIENT)
Dept: PULMONOLOGY | Age: 68
End: 2018-09-11
Payer: MEDICARE

## 2018-09-11 VITALS
RESPIRATION RATE: 16 BRPM | DIASTOLIC BLOOD PRESSURE: 79 MMHG | HEART RATE: 71 BPM | HEIGHT: 61 IN | WEIGHT: 160 LBS | OXYGEN SATURATION: 98 % | BODY MASS INDEX: 30.21 KG/M2 | SYSTOLIC BLOOD PRESSURE: 136 MMHG

## 2018-09-11 DIAGNOSIS — R05.3 CHRONIC COUGH: ICD-10-CM

## 2018-09-11 DIAGNOSIS — J30.1 CHRONIC SEASONAL ALLERGIC RHINITIS DUE TO POLLEN: ICD-10-CM

## 2018-09-11 DIAGNOSIS — J98.11 ATELECTASIS: ICD-10-CM

## 2018-09-11 DIAGNOSIS — K21.9 GASTROESOPHAGEAL REFLUX DISEASE, ESOPHAGITIS PRESENCE NOT SPECIFIED: ICD-10-CM

## 2018-09-11 DIAGNOSIS — J45.30 MILD PERSISTENT ASTHMA, UNSPECIFIED WHETHER COMPLICATED: Primary | ICD-10-CM

## 2018-09-11 PROCEDURE — 4040F PNEUMOC VAC/ADMIN/RCVD: CPT | Performed by: INTERNAL MEDICINE

## 2018-09-11 PROCEDURE — 1090F PRES/ABSN URINE INCON ASSESS: CPT | Performed by: INTERNAL MEDICINE

## 2018-09-11 PROCEDURE — G8417 CALC BMI ABV UP PARAM F/U: HCPCS | Performed by: INTERNAL MEDICINE

## 2018-09-11 PROCEDURE — 99214 OFFICE O/P EST MOD 30 MIN: CPT | Performed by: INTERNAL MEDICINE

## 2018-09-11 PROCEDURE — G8427 DOCREV CUR MEDS BY ELIG CLIN: HCPCS | Performed by: INTERNAL MEDICINE

## 2018-09-11 PROCEDURE — 1123F ACP DISCUSS/DSCN MKR DOCD: CPT | Performed by: INTERNAL MEDICINE

## 2018-09-11 PROCEDURE — 1036F TOBACCO NON-USER: CPT | Performed by: INTERNAL MEDICINE

## 2018-09-11 PROCEDURE — 3017F COLORECTAL CA SCREEN DOC REV: CPT | Performed by: INTERNAL MEDICINE

## 2018-09-11 PROCEDURE — 1101F PT FALLS ASSESS-DOCD LE1/YR: CPT | Performed by: INTERNAL MEDICINE

## 2018-09-11 PROCEDURE — G8400 PT W/DXA NO RESULTS DOC: HCPCS | Performed by: INTERNAL MEDICINE

## 2018-09-11 PROCEDURE — G8599 NO ASA/ANTIPLAT THER USE RNG: HCPCS | Performed by: INTERNAL MEDICINE

## 2018-09-11 RX ORDER — ALBUTEROL SULFATE 90 UG/1
2 AEROSOL, METERED RESPIRATORY (INHALATION) EVERY 6 HOURS PRN
Qty: 1 INHALER | Refills: 3 | Status: SHIPPED | OUTPATIENT
Start: 2018-09-11

## 2018-09-11 NOTE — PROGRESS NOTES
negative          Allergies:  No Known Allergies    Medications:    Current Outpatient Prescriptions:     albuterol sulfate HFA (PROAIR HFA) 108 (90 Base) MCG/ACT inhaler, Inhale 2 puffs into the lungs every 6 hours as needed for Wheezing, Disp: 1 Inhaler, Rfl: 3    meclizine (ANTIVERT) 25 MG tablet, TAKE 1 TABLET BY MOUTH THREE TIMES A DAY AS NEEDED FOR NAUSEA or dizziness , Disp: 60 tablet, Rfl: 1    omeprazole (PRILOSEC) 20 MG delayed release capsule, TAKE 1 CAPSULE BY MOUTH EVERY DAY, Disp: 90 capsule, Rfl: 2    promethazine (PHENERGAN) 25 MG tablet, TAKE 1/2 TABLET BY MOUTH EVERY SIX HOURS AS NEEDED FOR NAUSEA, Disp: 30 tablet, Rfl: 0    ondansetron (ZOFRAN-ODT) 4 MG disintegrating tablet, DISSOLVE 1 TABLET IN MOUTH EVERY EIGHT HOURS AS NEEDED FOR NAUSEA AND VOMITING, Disp: 30 tablet, Rfl: 0    atorvastatin (LIPITOR) 10 MG tablet, TAKE 1 TABLET BY MOUTH ONCE DAILY, Disp: 90 tablet, Rfl: 0    fluticasone (FLOVENT HFA) 110 MCG/ACT inhaler, Inhale 1 puff into the lungs 2 times daily, Disp: 1 Inhaler, Rfl: 5    fluticasone (FLONASE) 50 MCG/ACT nasal spray, 2 sprays by Nasal route daily, Disp: 1 Bottle, Rfl: 5    metFORMIN (GLUCOPHAGE XR) 500 MG extended release tablet, Take 1 tablet by mouth daily (with breakfast), Disp: 30 tablet, Rfl: 5    Glucose Blood (BLOOD GLUCOSE TEST STRIPS) STRP, Inject 1 each into the skin daily and prn for diabetes, Disp: 50 strip, Rfl: 3    Lancets MISC, Inject 1 each into the skin daily and prn for diabetes, Disp: 50 each, Rfl: 3    Blood Glucose Monitoring Suppl SU, Inject 1 each into the skin daily and prn for diabetes, Disp: 1 Device, Rfl: 0    APRISO 0.375 g extended release capsule, TAKE 4 CAPSULES BY MOUTH DAILY. , Disp: 360 capsule, Rfl: 0    metoprolol tartrate (LOPRESSOR) 25 MG tablet, Take 50 mg by mouth daily , Disp: , Rfl:     amLODIPine (NORVASC) 2.5 MG tablet, Take 1 tablet by mouth daily, Disp: 30 tablet, Rfl: 3    Handicap Placard MISC, Duration 5 years scars.  Neurological/Psychiatric: The patient's general behavior, level of consciousness, thought content and emotional status is normal.       Labs:    Chest x-ray with no problems         Assessment:    1. Mild persistent asthma, unspecified whether complicated    2. Chronic cough    3. Gastroesophageal reflux disease, esophagitis presence not specified    4. Chronic seasonal allergic rhinitis due to pollen    5. Atelectasis          PLAN:    Recommended. Asthma education   Refills were provided.-Albuterol as needed    Patient was recommended to have prednisone and an antibiotic available for use during an exacerbation  Educated and clarified the medication use. If the patient were to remain asymptomatic after month, I will inform her that she could stop using the Flovent but carry the albuterol with her and if the albuterol need were to be more than twice a week, to restart Flovent   Discussed use, benefit, and side effects of prescribed medications. Barriers to medication compliance addressed. Berkley Parry received counseling on the following healthy behaviors: nutrition, exercise and medication adherence  Recommend flu vaccination in the fall annually. Recommendations given regarding pneumococcal vaccinations. Patient is up-to-date with vaccinations from pulmonary perspective. Maintain an active lifestyle. Patient was educated on how to use the respiratory medications. All the questions that the patient  has had were answere. Her ction. Home O2 evaluation to be done. Supplemental oxygen waNot needed. Chest x-ray was reviewed. After reviewing the patient's smoking history and his age patient does not meet the criteria for lung cancer screening. We'll see the patient back in 6 months  Thank you for having us involved in the care of your patient. Please call us if you have any questions or concerns.         Kei King MD             9/11/2018, 3:22 PM

## 2018-09-18 RX ORDER — MESALAMINE 375 MG/1
CAPSULE, EXTENDED RELEASE ORAL
Qty: 360 CAPSULE | Refills: 0 | Status: SHIPPED | OUTPATIENT
Start: 2018-09-18 | End: 2018-12-16 | Stop reason: SDUPTHER

## 2018-12-03 ENCOUNTER — HOSPITAL ENCOUNTER (OUTPATIENT)
Dept: GENERAL RADIOLOGY | Facility: CLINIC | Age: 68
Discharge: HOME | End: 2018-12-05
Payer: MEDICARE

## 2018-12-03 ENCOUNTER — HOSPITAL ENCOUNTER (OUTPATIENT)
Facility: CLINIC | Age: 68
Discharge: HOME | End: 2018-12-05
Payer: MEDICARE

## 2018-12-03 DIAGNOSIS — M25.562 ACUTE PAIN OF BOTH KNEES: ICD-10-CM
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
## 2018-12-03 DIAGNOSIS — M25.561 ACUTE PAIN OF BOTH KNEES: ICD-10-CM

## 2018-12-03 PROCEDURE — 73564 X-RAY EXAM KNEE 4 OR MORE: CPT

## 2019-01-02 RX ORDER — MESALAMINE 375 MG/1
CAPSULE, EXTENDED RELEASE ORAL
Qty: 360 CAPSULE | Refills: 0 | Status: SHIPPED | OUTPATIENT
Start: 2019-01-02 | End: 2019-05-29 | Stop reason: SDUPTHER

## 2019-01-07 RX ORDER — MESALAMINE 375 MG/1
CAPSULE, EXTENDED RELEASE ORAL
Qty: 360 CAPSULE | Refills: 0 | OUTPATIENT
Start: 2019-01-07

## 2019-01-07 RX ORDER — MESALAMINE 375 MG/1
CAPSULE, EXTENDED RELEASE ORAL
Qty: 360 CAPSULE | Refills: 0 | Status: SHIPPED | OUTPATIENT
Start: 2019-01-07 | End: 2019-01-31 | Stop reason: SDUPTHER

## 2019-01-31 ENCOUNTER — HOSPITAL ENCOUNTER (OUTPATIENT)
Age: 69
Setting detail: SPECIMEN
Discharge: HOME | End: 2019-01-31
Payer: MEDICARE

## 2019-01-31 LAB
CREATININE URINE: 82.9 MG/DL (ref 28–217)
MICROALBUMIN/CREAT 24H UR: <12 MG/L
MICROALBUMIN/CREAT UR-RTO: NORMAL MCG/MG CREAT

## 2019-03-12 ENCOUNTER — OFFICE VISIT (OUTPATIENT)
Dept: PULMONOLOGY | Age: 69
End: 2019-03-12
Payer: MEDICARE

## 2019-03-12 VITALS
SYSTOLIC BLOOD PRESSURE: 128 MMHG | OXYGEN SATURATION: 96 % | HEIGHT: 61 IN | DIASTOLIC BLOOD PRESSURE: 80 MMHG | BODY MASS INDEX: 30.02 KG/M2 | HEART RATE: 63 BPM | WEIGHT: 159 LBS | TEMPERATURE: 98.2 F

## 2019-03-12 DIAGNOSIS — J45.30 MILD PERSISTENT ASTHMA WITHOUT COMPLICATION: Primary | ICD-10-CM

## 2019-03-12 DIAGNOSIS — J10.1 INFLUENZA A: ICD-10-CM

## 2019-03-12 DIAGNOSIS — J30.9 ALLERGIC RHINITIS, UNSPECIFIED SEASONALITY, UNSPECIFIED TRIGGER: ICD-10-CM

## 2019-03-12 PROCEDURE — G8482 FLU IMMUNIZE ORDER/ADMIN: HCPCS | Performed by: INTERNAL MEDICINE

## 2019-03-12 PROCEDURE — 3017F COLORECTAL CA SCREEN DOC REV: CPT | Performed by: INTERNAL MEDICINE

## 2019-03-12 PROCEDURE — G8417 CALC BMI ABV UP PARAM F/U: HCPCS | Performed by: INTERNAL MEDICINE

## 2019-03-12 PROCEDURE — 1090F PRES/ABSN URINE INCON ASSESS: CPT | Performed by: INTERNAL MEDICINE

## 2019-03-12 PROCEDURE — G8400 PT W/DXA NO RESULTS DOC: HCPCS | Performed by: INTERNAL MEDICINE

## 2019-03-12 PROCEDURE — 1036F TOBACCO NON-USER: CPT | Performed by: INTERNAL MEDICINE

## 2019-03-12 PROCEDURE — 99215 OFFICE O/P EST HI 40 MIN: CPT | Performed by: INTERNAL MEDICINE

## 2019-03-12 PROCEDURE — 1123F ACP DISCUSS/DSCN MKR DOCD: CPT | Performed by: INTERNAL MEDICINE

## 2019-03-12 PROCEDURE — 1101F PT FALLS ASSESS-DOCD LE1/YR: CPT | Performed by: INTERNAL MEDICINE

## 2019-03-12 PROCEDURE — 4040F PNEUMOC VAC/ADMIN/RCVD: CPT | Performed by: INTERNAL MEDICINE

## 2019-03-12 PROCEDURE — G8427 DOCREV CUR MEDS BY ELIG CLIN: HCPCS | Performed by: INTERNAL MEDICINE

## 2019-03-12 RX ORDER — FLUTICASONE PROPIONATE 110 UG/1
1 AEROSOL, METERED RESPIRATORY (INHALATION) 2 TIMES DAILY
Qty: 1 INHALER | Refills: 5 | Status: SHIPPED | OUTPATIENT
Start: 2019-03-12 | End: 2019-06-11 | Stop reason: SDUPTHER

## 2019-04-17 DIAGNOSIS — K51.919 ULCERATIVE COLITIS WITH COMPLICATION, UNSPECIFIED LOCATION (HCC): Primary | ICD-10-CM

## 2019-04-26 RX ORDER — MESALAMINE 375 MG/1
CAPSULE, EXTENDED RELEASE ORAL
Qty: 360 CAPSULE | Refills: 0 | Status: SHIPPED | OUTPATIENT
Start: 2019-04-26 | End: 2019-07-27 | Stop reason: SDUPTHER

## 2019-05-29 ENCOUNTER — OFFICE VISIT (OUTPATIENT)
Dept: GASTROENTEROLOGY | Age: 69
End: 2019-05-29
Payer: MEDICARE

## 2019-05-29 ENCOUNTER — HOSPITAL ENCOUNTER (OUTPATIENT)
Age: 69
Setting detail: SPECIMEN
Discharge: HOME | End: 2019-05-29
Payer: MEDICARE

## 2019-05-29 VITALS
DIASTOLIC BLOOD PRESSURE: 79 MMHG | SYSTOLIC BLOOD PRESSURE: 128 MMHG | HEART RATE: 60 BPM | BODY MASS INDEX: 30.84 KG/M2 | WEIGHT: 163.2 LBS

## 2019-05-29 DIAGNOSIS — K51.919 ULCERATIVE COLITIS WITH COMPLICATION, UNSPECIFIED LOCATION (HCC): Primary | ICD-10-CM

## 2019-05-29 DIAGNOSIS — K21.9 GASTROESOPHAGEAL REFLUX DISEASE WITHOUT ESOPHAGITIS: ICD-10-CM

## 2019-05-29 DIAGNOSIS — K51.919 ULCERATIVE COLITIS WITH COMPLICATION, UNSPECIFIED LOCATION (HCC): ICD-10-CM

## 2019-05-29 LAB
ABSOLUTE EOS #: 0.06 K/UL (ref 0–0.44)
ABSOLUTE IMMATURE GRANULOCYTE: <0.03 K/UL (ref 0–0.3)
ABSOLUTE LYMPH #: 3.41 K/UL (ref 1.1–3.7)
ABSOLUTE MONO #: 0.63 K/UL (ref 0.1–1.2)
ALBUMIN SERPL-MCNC: 4 G/DL (ref 3.5–5.2)
ALBUMIN/GLOBULIN RATIO: 1.3 (ref 1–2.5)
ALP BLD-CCNC: 96 U/L (ref 35–104)
ALT SERPL-CCNC: 16 U/L (ref 5–33)
ANION GAP SERPL CALCULATED.3IONS-SCNC: 11 MMOL/L (ref 9–17)
AST SERPL-CCNC: 17 U/L
BASOPHILS # BLD: 0 % (ref 0–2)
BASOPHILS ABSOLUTE: 0.03 K/UL (ref 0–0.2)
BILIRUB SERPL-MCNC: 0.62 MG/DL (ref 0.3–1.2)
BUN BLDV-MCNC: 18 MG/DL (ref 8–23)
BUN/CREAT BLD: NORMAL (ref 9–20)
CALCIUM SERPL-MCNC: 9.2 MG/DL (ref 8.6–10.4)
CHLORIDE BLD-SCNC: 105 MMOL/L (ref 98–107)
CO2: 25 MMOL/L (ref 20–31)
CREAT SERPL-MCNC: 0.54 MG/DL (ref 0.5–0.9)
DIFFERENTIAL TYPE: ABNORMAL
EOSINOPHILS RELATIVE PERCENT: 1 % (ref 1–4)
GFR AFRICAN AMERICAN: >60 ML/MIN
GFR NON-AFRICAN AMERICAN: >60 ML/MIN
GFR SERPL CREATININE-BSD FRML MDRD: NORMAL ML/MIN/{1.73_M2}
GFR SERPL CREATININE-BSD FRML MDRD: NORMAL ML/MIN/{1.73_M2}
GLUCOSE BLD-MCNC: 75 MG/DL (ref 70–99)
HCT VFR BLD CALC: 45.7 % (ref 36.3–47.1)
HEMOGLOBIN: 14.5 G/DL (ref 11.9–15.1)
IMMATURE GRANULOCYTES: 0 %
LIPASE: 24 U/L (ref 13–60)
LYMPHOCYTES # BLD: 48 % (ref 24–43)
MCH RBC QN AUTO: 28.9 PG (ref 25.2–33.5)
MCHC RBC AUTO-ENTMCNC: 31.7 G/DL (ref 28.4–34.8)
MCV RBC AUTO: 91.2 FL (ref 82.6–102.9)
MONOCYTES # BLD: 9 % (ref 3–12)
NRBC AUTOMATED: 0 PER 100 WBC
PDW BLD-RTO: 12.5 % (ref 11.8–14.4)
PLATELET # BLD: 253 K/UL (ref 138–453)
PLATELET ESTIMATE: ABNORMAL
PMV BLD AUTO: 11.8 FL (ref 8.1–13.5)
POTASSIUM SERPL-SCNC: 4.4 MMOL/L (ref 3.7–5.3)
RBC # BLD: 5.01 M/UL (ref 3.95–5.11)
RBC # BLD: ABNORMAL 10*6/UL
SEDIMENTATION RATE, ERYTHROCYTE: 6 MM (ref 0–20)
SEG NEUTROPHILS: 42 % (ref 36–65)
SEGMENTED NEUTROPHILS ABSOLUTE COUNT: 2.99 K/UL (ref 1.5–8.1)
SODIUM BLD-SCNC: 141 MMOL/L (ref 135–144)
TOTAL PROTEIN: 7 G/DL (ref 6.4–8.3)
WBC # BLD: 7.1 K/UL (ref 3.5–11.3)
WBC # BLD: ABNORMAL 10*3/UL

## 2019-05-29 PROCEDURE — 1123F ACP DISCUSS/DSCN MKR DOCD: CPT | Performed by: INTERNAL MEDICINE

## 2019-05-29 PROCEDURE — 99214 OFFICE O/P EST MOD 30 MIN: CPT | Performed by: INTERNAL MEDICINE

## 2019-05-29 PROCEDURE — G8417 CALC BMI ABV UP PARAM F/U: HCPCS | Performed by: INTERNAL MEDICINE

## 2019-05-29 PROCEDURE — G8400 PT W/DXA NO RESULTS DOC: HCPCS | Performed by: INTERNAL MEDICINE

## 2019-05-29 PROCEDURE — G8427 DOCREV CUR MEDS BY ELIG CLIN: HCPCS | Performed by: INTERNAL MEDICINE

## 2019-05-29 PROCEDURE — 1090F PRES/ABSN URINE INCON ASSESS: CPT | Performed by: INTERNAL MEDICINE

## 2019-05-29 PROCEDURE — 3017F COLORECTAL CA SCREEN DOC REV: CPT | Performed by: INTERNAL MEDICINE

## 2019-05-29 PROCEDURE — 1036F TOBACCO NON-USER: CPT | Performed by: INTERNAL MEDICINE

## 2019-05-29 PROCEDURE — 4040F PNEUMOC VAC/ADMIN/RCVD: CPT | Performed by: INTERNAL MEDICINE

## 2019-05-29 RX ORDER — VERAPAMIL HYDROCHLORIDE 120 MG/1
120 TABLET, FILM COATED ORAL DAILY
COMMUNITY
End: 2020-08-17 | Stop reason: SDUPTHER

## 2019-05-29 RX ORDER — MESALAMINE 0.38 G/1
750 CAPSULE, EXTENDED RELEASE ORAL DAILY
Qty: 180 CAPSULE | Refills: 3 | Status: SHIPPED | OUTPATIENT
Start: 2019-05-29 | End: 2019-07-29

## 2019-05-29 ASSESSMENT — ENCOUNTER SYMPTOMS
VOMITING: 0
VOICE CHANGE: 0
DIARRHEA: 0
SINUS PRESSURE: 0
ANAL BLEEDING: 0
RECTAL PAIN: 0
TROUBLE SWALLOWING: 0
SORE THROAT: 0
CHOKING: 0
GASTROINTESTINAL NEGATIVE: 1
RESPIRATORY NEGATIVE: 1
COUGH: 0
NAUSEA: 0
WHEEZING: 0
BLOOD IN STOOL: 0
BACK PAIN: 0
ABDOMINAL DISTENTION: 0
CONSTIPATION: 0
ABDOMINAL PAIN: 0

## 2019-05-29 NOTE — PROGRESS NOTES
Subjective:      Patient ID: Claudeen Merino is a 77 y.o. female. Dr. Edi Lala MD our mutual patient Claudeen Merino was seen  for   1. Ulcerative colitis with complication, unspecified location ([facility].)    2. Gastroesophageal reflux disease without esophagitis     . Patient seen for follow-up of ulcerative colitis. She has colitis for more than 10 years. At present she is on Apriso, and colitis appears to be in remission. On further discussion she states that she has 1 formed stools a day. No hematochezia. Denies abdominal pain, bloating, nausea or vomiting. Has good appetite and there is no weight loss. No fever or chills. Energy levels satisfactory. She denies symptoms suggestive of extraintestinal manifestation of ulcerative colitis. Her previous records reviewed and her last colonoscopy was in 2015. She did not have labs done recently. Her son has colitis. The details not clear. Past Medical, Family, and Social History reviewed and does contribute to the patient presenting condition. patient\"s PMH/PSH,SH,PSYCH hx, MEDs, ALLERGIES, and ROS was all reviewed and updated ion the appropriate sections      HPI    Review of Systems   Constitutional: Negative. Negative for appetite change, fatigue and unexpected weight change. HENT: Negative. Negative for dental problem, postnasal drip, sinus pressure, sore throat, trouble swallowing and voice change. Eyes: Positive for visual disturbance (glasses). Respiratory: Negative. Negative for cough, choking and wheezing. Cardiovascular: Negative. Negative for chest pain, palpitations and leg swelling. Gastrointestinal: Negative. Negative for abdominal distention, abdominal pain, anal bleeding, blood in stool, constipation, diarrhea, nausea, rectal pain and vomiting. Genitourinary: Negative. Negative for difficulty urinating. Musculoskeletal: Negative. Negative for arthralgias, back pain, gait problem and myalgias.

## 2019-05-30 RX ORDER — POLYETHYLENE GLYCOL 3350 17 G/17G
POWDER, FOR SOLUTION ORAL
Qty: 255 G | Refills: 0 | Status: SHIPPED | OUTPATIENT
Start: 2019-05-30 | End: 2019-06-28

## 2019-06-10 ENCOUNTER — TELEPHONE (OUTPATIENT)
Dept: GASTROENTEROLOGY | Age: 69
End: 2019-06-10

## 2019-06-10 NOTE — TELEPHONE ENCOUNTER
Patient called to reschedule her 06/18/19 procedure at [facility]. She is rescheduled for 07/02/19 at [facility] at 7:30AM. Patient declined new instructions to be sent. Surgery scheduling notified.

## 2019-06-11 ENCOUNTER — OFFICE VISIT (OUTPATIENT)
Dept: PULMONOLOGY | Age: 69
End: 2019-06-11
Payer: MEDICARE

## 2019-06-11 VITALS
WEIGHT: 163.6 LBS | SYSTOLIC BLOOD PRESSURE: 116 MMHG | HEIGHT: 61 IN | DIASTOLIC BLOOD PRESSURE: 70 MMHG | TEMPERATURE: 97.9 F | BODY MASS INDEX: 30.89 KG/M2 | HEART RATE: 69 BPM

## 2019-06-11 DIAGNOSIS — E66.9 OBESITY (BMI 30.0-34.9): ICD-10-CM

## 2019-06-11 DIAGNOSIS — K21.9 GASTROESOPHAGEAL REFLUX DISEASE, ESOPHAGITIS PRESENCE NOT SPECIFIED: ICD-10-CM

## 2019-06-11 DIAGNOSIS — J30.1 CHRONIC SEASONAL ALLERGIC RHINITIS DUE TO POLLEN: ICD-10-CM

## 2019-06-11 DIAGNOSIS — J45.30 MILD PERSISTENT ASTHMA WITHOUT COMPLICATION: Primary | ICD-10-CM

## 2019-06-11 PROCEDURE — 3017F COLORECTAL CA SCREEN DOC REV: CPT | Performed by: INTERNAL MEDICINE

## 2019-06-11 PROCEDURE — 1036F TOBACCO NON-USER: CPT | Performed by: INTERNAL MEDICINE

## 2019-06-11 PROCEDURE — 1123F ACP DISCUSS/DSCN MKR DOCD: CPT | Performed by: INTERNAL MEDICINE

## 2019-06-11 PROCEDURE — G8400 PT W/DXA NO RESULTS DOC: HCPCS | Performed by: INTERNAL MEDICINE

## 2019-06-11 PROCEDURE — G8417 CALC BMI ABV UP PARAM F/U: HCPCS | Performed by: INTERNAL MEDICINE

## 2019-06-11 PROCEDURE — 99214 OFFICE O/P EST MOD 30 MIN: CPT | Performed by: INTERNAL MEDICINE

## 2019-06-11 PROCEDURE — 1090F PRES/ABSN URINE INCON ASSESS: CPT | Performed by: INTERNAL MEDICINE

## 2019-06-11 PROCEDURE — 4040F PNEUMOC VAC/ADMIN/RCVD: CPT | Performed by: INTERNAL MEDICINE

## 2019-06-11 PROCEDURE — G8427 DOCREV CUR MEDS BY ELIG CLIN: HCPCS | Performed by: INTERNAL MEDICINE

## 2019-06-11 RX ORDER — FLUTICASONE PROPIONATE 50 MCG
2 SPRAY, SUSPENSION (ML) NASAL DAILY
Qty: 1 BOTTLE | Refills: 5 | Status: SHIPPED | OUTPATIENT
Start: 2019-06-11 | End: 2020-06-09 | Stop reason: SDUPTHER

## 2019-06-11 RX ORDER — FLUTICASONE PROPIONATE 110 UG/1
1 AEROSOL, METERED RESPIRATORY (INHALATION) 2 TIMES DAILY
Qty: 1 INHALER | Refills: 5 | Status: SHIPPED | OUTPATIENT
Start: 2019-06-11 | End: 2020-06-09 | Stop reason: SDUPTHER

## 2019-06-16 NOTE — PROGRESS NOTES
DISSOLVE 1 TABLET IN MOUTH EVERY EIGHT HOURS AS NEEDED FOR NAUSEA AND VOMITING, Disp: 30 tablet, Rfl: 0    Blood Glucose Monitoring Suppl SU, Inject 1 each into the skin daily and prn for diabetes (Patient taking differently: Inject 1 each into the skin daily for diabetes), Disp: 1 Device, Rfl: 0    amLODIPine (NORVASC) 2.5 MG tablet, Take 1 tablet by mouth daily, Disp: 30 tablet, Rfl: 3    Handicap Placard MISC, Duration 5 years  on 2022, Disp: 1 each, Rfl: 0    Loratadine (CLARITIN PO), Take by mouth daily , Disp: , Rfl:       Objective:    Physical Exam:  Vitals: /70   Pulse 69   Temp 97.9 °F (36.6 °C)   Ht 5' 1\" (1.549 m)   Wt 163 lb 9.6 oz (74.2 kg)   BMI 30.91 kg/m²   Last 3 weights: Wt Readings from Last 3 Encounters:   19 163 lb 9.6 oz (74.2 kg)   19 163 lb 3.2 oz (74 kg)   19 159 lb (72.1 kg)     Body mass index is 30.91 kg/m². Physical Examination:   PHYSICAL EXAMINATION:  Vitals:    19 1330   BP: 116/70   Pulse: 69   Temp: 97.9 °F (36.6 °C)   Weight: 163 lb 9.6 oz (74.2 kg)   Height: 5' 1\" (1.549 m)       Physical Exam    Constitutional: This is a well developed, well nourish. Her BMI was, 30-34.9 - Obesity Grade I 77y.o. year old female who is alert, oriented, cooperative and in no apparent distress. Head:normocephalic and atraumatic. EENT:   ADAM. No conjunctival injections. Septum was midline, mucosa was without erythema, exudates or cobblestoning. No thrush was noted. Mallampati II (soft palate, uvula, fauces visible)  Neck: Supple without thyromegaly. No elevated JVP. Trachea was midline. Respiratory: Chest was symmetrical without dullness to percussion. Breath sounds bilaterally were clear to auscultation. There were no wheezes, rhonchi or rales. There is no intercostal retraction or use of accessory muscles. No egophony noted. Cardiovascular: Regular without murmur, clicks, gallops or rubs.    Abdomen: Slightly rounded and soft without organomegaly. No rebound tenderness, rigidity or guarding was appreciated. Lymphatic: No lymphadenopathy. Musculoskeletal: Normal curvature of the spine. No gross muscle weakness. Extremities:  No lower extremity edema, ulcerations, tenderness, varicosities or erythema. Muscle size, tone and strength are normal.  No involuntary movements are noted. Skin:  Warm and dry. Good color, turgor and pigmentation. No lesions or scars. Neurological/Psychiatric: The patient's general behavior, level of consciousness, thought content and emotional status is normal.       Labs:    Chest x-ray with no problems         Assessment:    1. Mild persistent asthma without complication    2. Chronic seasonal allergic rhinitis due to pollen    3. Gastroesophageal reflux disease, esophagitis presence not specified    4. Obesity (BMI 30.0-34. 9)          PLAN:    Recommended. Asthma education   Refills were provided.-Flovent and Flonase  Patient was recommended to have prednisone and an antibiotic available for use during an exacerbation  Educated and clarified the medication use. Discussed use, benefit, and side effects of prescribed medications. Barriers to medication compliance addressed. Jame Fuentes received counseling on the following healthy behaviors: nutrition, exercise and medication adherence  Recommend flu vaccination in the fall annually. Recommendations given regarding pneumococcal vaccinations. Patient is up-to-date with vaccinations from pulmonary perspective. Maintain an active lifestyle. Patient was educated on how to use the respiratory medications. All the questions that the patient  has had were answere. Her ction. Home O2 evaluation to be done. Supplemental oxygen waNot needed. After reviewing the patient's smoking history and his age patient does not meet the criteria for lung cancer screening.   We'll see the patient back in 12 months or earlier if needed  She will call us if she is sick and need to be seen sooner  Thank you for having us involved in the care of your patient. Please call us if you have any questions or concerns.         Marcela Maldonado MD             6/16/2019, 7:17 AM

## 2019-06-28 ENCOUNTER — TELEPHONE (OUTPATIENT)
Dept: GASTROENTEROLOGY | Age: 69
End: 2019-06-28

## 2019-06-28 NOTE — TELEPHONE ENCOUNTER
Spoke with patient and confirmed her 07/02/19 colon at [facility]. Patient has a  and had no questions regarding her prep.

## 2019-07-02 ENCOUNTER — HOSPITAL ENCOUNTER (OUTPATIENT)
Age: 69
Setting detail: OUTPATIENT SURGERY
Discharge: HOME | End: 2019-07-02
Attending: INTERNAL MEDICINE | Admitting: INTERNAL MEDICINE
Payer: MEDICARE

## 2019-07-02 ENCOUNTER — ANESTHESIA EVENT (OUTPATIENT)
Dept: ENDOSCOPY | Age: 69
End: 2019-07-02
Payer: MEDICARE

## 2019-07-02 ENCOUNTER — ANESTHESIA (OUTPATIENT)
Dept: ENDOSCOPY | Age: 69
End: 2019-07-02
Payer: MEDICARE

## 2019-07-02 VITALS — DIASTOLIC BLOOD PRESSURE: 60 MMHG | OXYGEN SATURATION: 97 % | SYSTOLIC BLOOD PRESSURE: 117 MMHG

## 2019-07-02 VITALS
OXYGEN SATURATION: 96 % | HEIGHT: 61 IN | DIASTOLIC BLOOD PRESSURE: 67 MMHG | HEART RATE: 57 BPM | RESPIRATION RATE: 20 BRPM | BODY MASS INDEX: 30.58 KG/M2 | TEMPERATURE: 97.2 F | SYSTOLIC BLOOD PRESSURE: 125 MMHG | WEIGHT: 162 LBS

## 2019-07-02 LAB
GLUCOSE BLD-MCNC: 95 MG/DL (ref 65–105)
GLUCOSE BLD-MCNC: 98 MG/DL (ref 65–105)

## 2019-07-02 PROCEDURE — 6360000002 HC RX W HCPCS: Performed by: ANESTHESIOLOGY

## 2019-07-02 PROCEDURE — 82947 ASSAY GLUCOSE BLOOD QUANT: CPT

## 2019-07-02 PROCEDURE — 3700000000 HC ANESTHESIA ATTENDED CARE: Performed by: INTERNAL MEDICINE

## 2019-07-02 PROCEDURE — 6360000002 HC RX W HCPCS: Performed by: NURSE ANESTHETIST, CERTIFIED REGISTERED

## 2019-07-02 PROCEDURE — 3700000001 HC ADD 15 MINUTES (ANESTHESIA): Performed by: INTERNAL MEDICINE

## 2019-07-02 PROCEDURE — 3609010300 HC COLONOSCOPY W/BIOPSY SINGLE/MULTIPLE: Performed by: INTERNAL MEDICINE

## 2019-07-02 PROCEDURE — 2709999900 HC NON-CHARGEABLE SUPPLY: Performed by: INTERNAL MEDICINE

## 2019-07-02 PROCEDURE — 7100000001 HC PACU RECOVERY - ADDTL 15 MIN: Performed by: INTERNAL MEDICINE

## 2019-07-02 PROCEDURE — 2580000003 HC RX 258: Performed by: INTERNAL MEDICINE

## 2019-07-02 PROCEDURE — 2500000003 HC RX 250 WO HCPCS: Performed by: ANESTHESIOLOGY

## 2019-07-02 PROCEDURE — 88305 TISSUE EXAM BY PATHOLOGIST: CPT

## 2019-07-02 PROCEDURE — 2500000003 HC RX 250 WO HCPCS: Performed by: NURSE ANESTHETIST, CERTIFIED REGISTERED

## 2019-07-02 PROCEDURE — 7100000000 HC PACU RECOVERY - FIRST 15 MIN: Performed by: INTERNAL MEDICINE

## 2019-07-02 PROCEDURE — 45380 COLONOSCOPY AND BIOPSY: CPT | Performed by: INTERNAL MEDICINE

## 2019-07-02 PROCEDURE — 2580000003 HC RX 258: Performed by: NURSE ANESTHETIST, CERTIFIED REGISTERED

## 2019-07-02 RX ORDER — SODIUM CHLORIDE, SODIUM LACTATE, POTASSIUM CHLORIDE, CALCIUM CHLORIDE 600; 310; 30; 20 MG/100ML; MG/100ML; MG/100ML; MG/100ML
INJECTION, SOLUTION INTRAVENOUS CONTINUOUS PRN
Status: DISCONTINUED | OUTPATIENT
Start: 2019-07-02 | End: 2019-07-02 | Stop reason: SDUPTHER

## 2019-07-02 RX ORDER — LIDOCAINE HYDROCHLORIDE 10 MG/ML
INJECTION, SOLUTION EPIDURAL; INFILTRATION; INTRACAUDAL; PERINEURAL PRN
Status: DISCONTINUED | OUTPATIENT
Start: 2019-07-02 | End: 2019-07-02 | Stop reason: SDUPTHER

## 2019-07-02 RX ORDER — PROPOFOL 10 MG/ML
INJECTION, EMULSION INTRAVENOUS PRN
Status: DISCONTINUED | OUTPATIENT
Start: 2019-07-02 | End: 2019-07-02 | Stop reason: SDUPTHER

## 2019-07-02 RX ORDER — PROPOFOL 10 MG/ML
INJECTION, EMULSION INTRAVENOUS CONTINUOUS PRN
Status: DISCONTINUED | OUTPATIENT
Start: 2019-07-02 | End: 2019-07-02 | Stop reason: SDUPTHER

## 2019-07-02 RX ORDER — SODIUM CHLORIDE, SODIUM LACTATE, POTASSIUM CHLORIDE, CALCIUM CHLORIDE 600; 310; 30; 20 MG/100ML; MG/100ML; MG/100ML; MG/100ML
INJECTION, SOLUTION INTRAVENOUS CONTINUOUS
Status: DISCONTINUED | OUTPATIENT
Start: 2019-07-02 | End: 2019-07-02 | Stop reason: HOSPADM

## 2019-07-02 RX ORDER — ONDANSETRON 2 MG/ML
4 INJECTION INTRAMUSCULAR; INTRAVENOUS ONCE
Status: COMPLETED | OUTPATIENT
Start: 2019-07-02 | End: 2019-07-02

## 2019-07-02 RX ADMIN — ONDANSETRON 4 MG: 2 INJECTION INTRAMUSCULAR; INTRAVENOUS at 07:04

## 2019-07-02 RX ADMIN — PROPOFOL 70 MG: 10 INJECTION, EMULSION INTRAVENOUS at 07:43

## 2019-07-02 RX ADMIN — PROPOFOL 125 MCG/KG/MIN: 10 INJECTION, EMULSION INTRAVENOUS at 07:43

## 2019-07-02 RX ADMIN — FAMOTIDINE 20 MG: 10 INJECTION, SOLUTION INTRAVENOUS at 07:08

## 2019-07-02 RX ADMIN — SODIUM CHLORIDE, POTASSIUM CHLORIDE, SODIUM LACTATE AND CALCIUM CHLORIDE: 600; 310; 30; 20 INJECTION, SOLUTION INTRAVENOUS at 07:42

## 2019-07-02 RX ADMIN — LIDOCAINE HYDROCHLORIDE 50 MG: 10 INJECTION, SOLUTION EPIDURAL; INFILTRATION; INTRACAUDAL; PERINEURAL at 07:43

## 2019-07-02 RX ADMIN — SODIUM CHLORIDE, POTASSIUM CHLORIDE, SODIUM LACTATE AND CALCIUM CHLORIDE: 600; 310; 30; 20 INJECTION, SOLUTION INTRAVENOUS at 07:04

## 2019-07-02 ASSESSMENT — PULMONARY FUNCTION TESTS
PIF_VALUE: 0
PIF_VALUE: 1
PIF_VALUE: 0

## 2019-07-02 ASSESSMENT — PAIN SCALES - GENERAL: PAINLEVEL_OUTOF10: 0

## 2019-07-02 NOTE — H&P
children: Not on file    Years of education: Not on file    Highest education level: Not on file   Occupational History    Not on file   Social Needs    Financial resource strain: Not on file    Food insecurity:     Worry: Not on file     Inability: Not on file    Transportation needs:     Medical: Not on file     Non-medical: Not on file   Tobacco Use    Smoking status: Never Smoker    Smokeless tobacco: Never Used   Substance and Sexual Activity    Alcohol use: No    Drug use: No    Sexual activity: Not on file   Lifestyle    Physical activity:     Days per week: Not on file     Minutes per session: Not on file    Stress: Not on file   Relationships    Social connections:     Talks on phone: Not on file     Gets together: Not on file     Attends Yarsanism service: Not on file     Active member of club or organization: Not on file     Attends meetings of clubs or organizations: Not on file     Relationship status: Not on file    Intimate partner violence:     Fear of current or ex partner: Not on file     Emotionally abused: Not on file     Physically abused: Not on file     Forced sexual activity: Not on file   Other Topics Concern    Not on file   Social History Narrative    Not on file           REVIEW OF SYSTEMS      No Known Allergies    No current facility-administered medications on file prior to encounter. Current Outpatient Medications on File Prior to Encounter   Medication Sig Dispense Refill    bisacodyl (DULCOLAX) 5 MG EC tablet TAKE 2 TABS AT 10 AM DAY PRIOR TO COLONOSCOPY 2 tablet 0    omeprazole (PRILOSEC) 20 MG delayed release capsule TAKE 1 CAPSULE BY MOUTH EVERY DAY 90 capsule 2    verapamil (CALAN) 120 MG tablet Take 120 mg by mouth daily      APRISO 0.375 g extended release capsule TAKE 4 CAPSULES BY MOUTH DAILY.  360 capsule 0    amLODIPine (NORVASC) 2.5 MG tablet Take 1 tablet by mouth daily 30 tablet 3    Loratadine (CLARITIN PO) Take by mouth daily       NECK:  No stiffness, trachea central.  No palpable masses or L.N.                 CHEST:  Symmetrical and equal on expansion. HEART:  RRR S1 > S2. No audible murmurs or gallops. LUNGS:  Equal on expansion, normal breath sounds. No adventitious sounds. ABDOMEN:  Obese. Soft on palpation. No localized tenderness. No guarding or rigidity. No palpable hepatosplenomegaly. LYMPHATICS:  No palpable cervical lymphadenopathy. LOCOMOTOR, BACK AND SPINE:  No tenderness or deformities. EXTREMITIES:  Symmetrical, no pretibial edema. Osmins sign negative. No discoloration or ulcerations. NEUROLOGIC:  The patient is conscious, alert, oriented,Cranial nerve II-XII intact, taste was not examined. No apparent focal sensory or motor deficits.              PROVISIONAL DIAGNOSES / SURGERY:      ULCERATIVE COLITIS WITH COMPLICATION     COLONOSCOPY DIAGNOSTIC    Patient Active Problem List    Diagnosis Date Noted    Hypertension, essential, benign 12/04/2017    Bronchitis     Steroid-induced hyperglycemia 05/29/2014    Bronchospasm 05/29/2014    Acute bronchitis 05/28/2014    Obesity (BMI 30.0-34.9) 05/28/2014    GERD (gastroesophageal reflux disease)     Hyperlipidemia     Ulcerative pancol[facility] without complication ([facility] [address]     Meniere disease            NAUN MIXON, APRN - CNP on 7/2/2019 at 6:40 AM

## 2019-07-02 NOTE — ANESTHESIA PRE PROCEDURE
Base) MCG/ACT inhaler Inhale 2 puffs into the lungs every 6 hours as needed for Wheezing 18   Jonathan Layne MD   meclizine (ANTIVERT) 25 MG tablet TAKE 1 TABLET BY MOUTH THREE TIMES A DAY AS NEEDED FOR NAUSEA or dizziness  9/10/18   Maureen Villagran MD   promethazine (PHENERGAN) 25 MG tablet TAKE 1/2 TABLET BY MOUTH EVERY SIX HOURS AS NEEDED FOR NAUSEA 18   Maureen Villagran MD   ondansetron (ZOFRAN-ODT) 4 MG disintegrating tablet DISSOLVE 1 TABLET IN MOUTH EVERY EIGHT HOURS AS NEEDED FOR NAUSEA AND VOMITING 18   VIBHA Moreira CNP   Blood Glucose Monitoring Suppl SU Inject 1 each into the skin daily and prn for diabetes  Patient taking differently: Inject 1 each into the skin daily for diabetes 18   VIBHA Moreira CNP   Handicap Placard MISC Duration 5 years  on 2022   Mercedez Leger DPM       Current medications:    Current Facility-Administered Medications   Medication Dose Route Frequency Provider Last Rate Last Dose    lactated ringers infusion   Intravenous Continuous Narcisa Augustin MD           Allergies:  No Known Allergies    Problem List:    Patient Active Problem List   Diagnosis Code    Hyperlipidemia E78.5    Ulcerative pancolitis without complication ([facility] [address] G21.37    Meniere disease H81.09    GERD (gastroesophageal reflux disease) K21.9    Acute bronchitis J20.9    Obesity (BMI 30.0-34. 9) E66.9    Steroid-induced hyperglycemia R73.9, T38.0X5A    Bronchospasm J98.01    Bronchitis J40    Hypertension, essential, benign I10       Past Medical History:        Diagnosis Date    Anginal pain ([address]     hx of angina    Bronchitis     Colitis, ulcerative ([address]     Diabetes mellitus ([address]     GERD (gastroesophageal reflux disease)     Hypercholesterolemia     Hyperlipidemia     Hypertension, essential, benign 2017    Meniere disease     Obesity (BMI 30.0-34.9) 2014    Ulcerative pancolitis without

## 2019-07-03 LAB — SURGICAL PATHOLOGY REPORT: NORMAL

## 2019-07-27 DIAGNOSIS — K51.919 ULCERATIVE COLITIS WITH COMPLICATION, UNSPECIFIED LOCATION (HCC): ICD-10-CM

## 2019-07-29 RX ORDER — MESALAMINE 375 MG/1
CAPSULE, EXTENDED RELEASE ORAL
Qty: 360 CAPSULE | Refills: 0 | Status: SHIPPED | OUTPATIENT
Start: 2019-07-29 | End: 2019-10-22 | Stop reason: SDUPTHER

## 2019-08-27 ENCOUNTER — OFFICE VISIT (OUTPATIENT)
Dept: GASTROENTEROLOGY | Age: 69
End: 2019-08-27
Payer: MEDICARE

## 2019-08-27 VITALS
HEART RATE: 68 BPM | BODY MASS INDEX: 30.47 KG/M2 | SYSTOLIC BLOOD PRESSURE: 144 MMHG | WEIGHT: 161.2 LBS | DIASTOLIC BLOOD PRESSURE: 76 MMHG

## 2019-08-27 DIAGNOSIS — K51.919 ULCERATIVE COLITIS WITH COMPLICATION, UNSPECIFIED LOCATION (HCC): Primary | ICD-10-CM

## 2019-08-27 DIAGNOSIS — K21.9 GASTROESOPHAGEAL REFLUX DISEASE WITHOUT ESOPHAGITIS: ICD-10-CM

## 2019-08-27 PROCEDURE — 1090F PRES/ABSN URINE INCON ASSESS: CPT | Performed by: INTERNAL MEDICINE

## 2019-08-27 PROCEDURE — G8427 DOCREV CUR MEDS BY ELIG CLIN: HCPCS | Performed by: INTERNAL MEDICINE

## 2019-08-27 PROCEDURE — 3017F COLORECTAL CA SCREEN DOC REV: CPT | Performed by: INTERNAL MEDICINE

## 2019-08-27 PROCEDURE — APPSS15 APP SPLIT SHARED TIME 0-15 MINUTES: Performed by: NURSE PRACTITIONER

## 2019-08-27 PROCEDURE — 1036F TOBACCO NON-USER: CPT | Performed by: INTERNAL MEDICINE

## 2019-08-27 PROCEDURE — G8400 PT W/DXA NO RESULTS DOC: HCPCS | Performed by: INTERNAL MEDICINE

## 2019-08-27 PROCEDURE — 1123F ACP DISCUSS/DSCN MKR DOCD: CPT | Performed by: INTERNAL MEDICINE

## 2019-08-27 PROCEDURE — 99213 OFFICE O/P EST LOW 20 MIN: CPT | Performed by: INTERNAL MEDICINE

## 2019-08-27 PROCEDURE — G8417 CALC BMI ABV UP PARAM F/U: HCPCS | Performed by: INTERNAL MEDICINE

## 2019-08-27 PROCEDURE — 4040F PNEUMOC VAC/ADMIN/RCVD: CPT | Performed by: INTERNAL MEDICINE

## 2019-08-27 ASSESSMENT — ENCOUNTER SYMPTOMS
SINUS PRESSURE: 0
VOMITING: 0
COUGH: 0
SORE THROAT: 0
RECTAL PAIN: 0
WHEEZING: 0
NAUSEA: 0
BACK PAIN: 0
CHOKING: 0
DIARRHEA: 1
CONSTIPATION: 0
RESPIRATORY NEGATIVE: 1
ABDOMINAL DISTENTION: 0
ABDOMINAL PAIN: 0
ANAL BLEEDING: 0
BLOOD IN STOOL: 0
ALLERGIC/IMMUNOLOGIC NEGATIVE: 1
TROUBLE SWALLOWING: 0
VOICE CHANGE: 0

## 2019-09-03 ENCOUNTER — HOSPITAL ENCOUNTER (OUTPATIENT)
Age: 69
Setting detail: SPECIMEN
Discharge: HOME | End: 2019-09-03
Payer: MEDICARE

## 2019-09-03 PROBLEM — E11.9 TYPE 2 DIABETES MELLITUS WITHOUT COMPLICATION, WITHOUT LONG-TERM CURRENT USE OF INSULIN (HCC): Status: ACTIVE | Noted: 2019-09-03

## 2019-09-05 LAB — DERMATOLOGY PATHOLOGY REPORT: NORMAL

## 2019-10-22 DIAGNOSIS — K51.919 ULCERATIVE COLITIS WITH COMPLICATION, UNSPECIFIED LOCATION (HCC): ICD-10-CM

## 2019-10-22 RX ORDER — MESALAMINE 375 MG/1
CAPSULE, EXTENDED RELEASE ORAL
Qty: 360 CAPSULE | Refills: 0 | Status: SHIPPED | OUTPATIENT
Start: 2019-10-22 | End: 2022-01-26 | Stop reason: SDUPTHER

## 2019-12-18 PROBLEM — J45.20 MILD INTERMITTENT ASTHMA WITHOUT COMPLICATION: Status: ACTIVE | Noted: 2019-12-18

## 2020-06-04 ENCOUNTER — OFFICE VISIT (OUTPATIENT)
Dept: PODIATRY | Age: 70
End: 2020-06-04
Payer: MEDICARE

## 2020-06-04 VITALS — HEIGHT: 61 IN | WEIGHT: 160 LBS | BODY MASS INDEX: 30.21 KG/M2

## 2020-06-04 PROCEDURE — 1036F TOBACCO NON-USER: CPT | Performed by: PODIATRIST

## 2020-06-04 PROCEDURE — G8427 DOCREV CUR MEDS BY ELIG CLIN: HCPCS | Performed by: PODIATRIST

## 2020-06-04 PROCEDURE — 1123F ACP DISCUSS/DSCN MKR DOCD: CPT | Performed by: PODIATRIST

## 2020-06-04 PROCEDURE — 4040F PNEUMOC VAC/ADMIN/RCVD: CPT | Performed by: PODIATRIST

## 2020-06-04 PROCEDURE — G8400 PT W/DXA NO RESULTS DOC: HCPCS | Performed by: PODIATRIST

## 2020-06-04 PROCEDURE — 1090F PRES/ABSN URINE INCON ASSESS: CPT | Performed by: PODIATRIST

## 2020-06-04 PROCEDURE — 3017F COLORECTAL CA SCREEN DOC REV: CPT | Performed by: PODIATRIST

## 2020-06-04 PROCEDURE — 11730 AVULSION NAIL PLATE SIMPLE 1: CPT | Performed by: PODIATRIST

## 2020-06-04 PROCEDURE — 99213 OFFICE O/P EST LOW 20 MIN: CPT | Performed by: PODIATRIST

## 2020-06-04 PROCEDURE — G8417 CALC BMI ABV UP PARAM F/U: HCPCS | Performed by: PODIATRIST

## 2020-06-04 RX ORDER — METOPROLOL SUCCINATE 50 MG/1
TABLET, EXTENDED RELEASE ORAL
COMMUNITY
Start: 2020-03-12

## 2020-06-05 ASSESSMENT — ENCOUNTER SYMPTOMS
NAUSEA: 0
VOMITING: 0
CONSTIPATION: 0
DIARRHEA: 0
COLOR CHANGE: 0

## 2020-06-05 NOTE — PROGRESS NOTES
[facility]  New Patient History and Physical    Chief Complaint:   Chief Complaint   Patient presents with    Nail Problem     right 2nd toenail is painful, patient has stubbed that toe a few times       HPI: Mojgan Stallworth is a 72 y.o. female who presents to the office today complaining of 2 things    1) trauma to right 2nd toe. Has stubbed it several times. Painful, bruised. Nail is loose and sticking up.     2) discoloration to her right 1st toe, getting worse. No pain. Other toes clear. No treatment, gradually getting worse. Currently denies F/C/N/V. No Known Allergies    Past Medical History:   Diagnosis Date    Anginal pain ([address]     hx of angina    Bronchitis 2014    [facility]itis, ulcerative ([facility]     Diabetes mellitus ([facility] [address] [facility]     GERD (gastroesophageal reflux disease)     Hypercholesterolemia     Hyperlipidemia     Hypertension, essential, benign 12/4/2017    Meniere disease     Obesity (BMI 30.0-34.9) 5/28/2014    Ulcerative pancolitis without complication ([facility] [address]     Vitamin D deficiency        Prior to Admission medications    Medication Sig Start Date End Date Taking?  Authorizing Provider   Efinaconazole 10 % SOLN Apply 1 Applicatorful topically daily 6/5/20  Yes Maren Hernandez DPM   metoprolol succinate (TOPROL XL) 50 MG extended release tablet TAKE 1 TABLET BY MOUTH EVERY DAY 3/12/20  Yes Historical Provider, MD   atorvastatin (LIPITOR) 10 MG tablet TAKE 1 TABLET BY MOUTH EVERY DAY 6/3/20  Yes VIBHA Cleaning CNP   metFORMIN (GLUCOPHAGE-XR) 500 MG extended release tablet Take 1 tablet by mouth daily (with breakfast) 5/27/20  Yes VIBHA Cleaning CNP   omeprazole (PRILOSEC) 20 MG delayed release capsule TAKE 1 CAPSULE BY MOUTH EVERY DAY 2/17/20  Yes VIBHA Hernandez CNP   blood glucose test strips (TRUE METRIX BLOOD GLUCOSE TEST) strip TEST 1 TIMES A DAY FOR SYMPTOMS OF IRREGULAR BLOOD GLUCOSE. 2/3/20  Yes VIBHA Correia CNP   APRISO 0.375 g extended Plan: Patient examined and evaluated. Current condition and treatment options discussed in detail. Verbal and written instructions given to patient. Contact office with any questions/problems/concerns. Simple nail avulsion(72605) Debrided Right 2nd toenail with a partial nail removal.  No anesthesia was utilized. The toe was prepped with betadine. The distal aspect of the nail was removed back to solid nail,  performed on the intermediate border of the Right 2nd toenails, simple nail avulsion of the nail border. The toe was dressed with antibiotic ointment and a band-aid. The patient tolerated the procedure well without apparent complications. Oral and written instructions were dispensed. Rx Jublia daily    Orders Placed This Encounter   Procedures    KS REMOVAL OF NAIL PLATE     Orders Placed This Encounter   Medications    Efinaconazole 10 % SOLN     Sig: Apply 1 Applicatorful topically daily     Dispense:  8 mL     Refill:  2        RTC in 3month(s).     6/4/2020

## 2020-06-09 ENCOUNTER — OFFICE VISIT (OUTPATIENT)
Dept: PULMONOLOGY | Age: 70
End: 2020-06-09
Payer: MEDICARE

## 2020-06-09 ENCOUNTER — TELEPHONE (OUTPATIENT)
Dept: PULMONOLOGY | Age: 70
End: 2020-06-09

## 2020-06-09 VITALS
BODY MASS INDEX: 30.22 KG/M2 | RESPIRATION RATE: 16 BRPM | DIASTOLIC BLOOD PRESSURE: 67 MMHG | SYSTOLIC BLOOD PRESSURE: 119 MMHG | TEMPERATURE: 98.1 F | HEART RATE: 73 BPM | HEIGHT: 61 IN | OXYGEN SATURATION: 98 % | WEIGHT: 160.05 LBS

## 2020-06-09 PROCEDURE — 1036F TOBACCO NON-USER: CPT | Performed by: INTERNAL MEDICINE

## 2020-06-09 PROCEDURE — G8427 DOCREV CUR MEDS BY ELIG CLIN: HCPCS | Performed by: INTERNAL MEDICINE

## 2020-06-09 PROCEDURE — 1123F ACP DISCUSS/DSCN MKR DOCD: CPT | Performed by: INTERNAL MEDICINE

## 2020-06-09 PROCEDURE — 99214 OFFICE O/P EST MOD 30 MIN: CPT | Performed by: INTERNAL MEDICINE

## 2020-06-09 PROCEDURE — 3017F COLORECTAL CA SCREEN DOC REV: CPT | Performed by: INTERNAL MEDICINE

## 2020-06-09 PROCEDURE — G8417 CALC BMI ABV UP PARAM F/U: HCPCS | Performed by: INTERNAL MEDICINE

## 2020-06-09 PROCEDURE — 1090F PRES/ABSN URINE INCON ASSESS: CPT | Performed by: INTERNAL MEDICINE

## 2020-06-09 PROCEDURE — 4040F PNEUMOC VAC/ADMIN/RCVD: CPT | Performed by: INTERNAL MEDICINE

## 2020-06-09 PROCEDURE — G8400 PT W/DXA NO RESULTS DOC: HCPCS | Performed by: INTERNAL MEDICINE

## 2020-06-09 RX ORDER — FLUTICASONE PROPIONATE 110 UG/1
1 AEROSOL, METERED RESPIRATORY (INHALATION) 2 TIMES DAILY
Qty: 1 INHALER | Refills: 5 | Status: SHIPPED | OUTPATIENT
Start: 2020-06-09 | End: 2022-02-17 | Stop reason: SDUPTHER

## 2020-06-09 RX ORDER — FLUTICASONE PROPIONATE 50 MCG
2 SPRAY, SUSPENSION (ML) NASAL DAILY
Qty: 1 BOTTLE | Refills: 5 | Status: SHIPPED | OUTPATIENT
Start: 2020-06-09 | End: 2021-11-23 | Stop reason: SDUPTHER

## 2020-06-09 ASSESSMENT — SLEEP AND FATIGUE QUESTIONNAIRES
ESS TOTAL SCORE: 1
HOW LIKELY ARE YOU TO NOD OFF OR FALL ASLEEP WHILE WATCHING TV: 0
HOW LIKELY ARE YOU TO NOD OFF OR FALL ASLEEP WHILE SITTING INACTIVE IN A PUBLIC PLACE: 0
HOW LIKELY ARE YOU TO NOD OFF OR FALL ASLEEP WHILE SITTING QUIETLY AFTER LUNCH WITHOUT ALCOHOL: 0
HOW LIKELY ARE YOU TO NOD OFF OR FALL ASLEEP IN A CAR, WHILE STOPPED FOR A FEW MINUTES IN TRAFFIC: 0
HOW LIKELY ARE YOU TO NOD OFF OR FALL ASLEEP WHILE SITTING AND TALKING TO SOMEONE: 0
HOW LIKELY ARE YOU TO NOD OFF OR FALL ASLEEP WHILE LYING DOWN TO REST IN THE AFTERNOON WHEN CIRCUMSTANCES PERMIT: 0
HOW LIKELY ARE YOU TO NOD OFF OR FALL ASLEEP WHEN YOU ARE A PASSENGER IN A CAR FOR AN HOUR WITHOUT A BREAK: 1
HOW LIKELY ARE YOU TO NOD OFF OR FALL ASLEEP WHILE SITTING AND READING: 0

## 2020-06-09 NOTE — PROGRESS NOTES
Allergies    Medications:    Current Outpatient Medications:     fluticasone (FLONASE) 50 MCG/ACT nasal spray, 2 sprays by Nasal route daily, Disp: 1 Bottle, Rfl: 5    fluticasone (FLOVENT HFA) 110 MCG/ACT inhaler, Inhale 1 puff into the lungs 2 times daily, Disp: 1 Inhaler, Rfl: 5    Efinaconazole 10 % SOLN, Apply 1 Applicatorful topically daily, Disp: 8 mL, Rfl: 2    metoprolol succinate (TOPROL XL) 50 MG extended release tablet, TAKE 1 TABLET BY MOUTH EVERY DAY, Disp: , Rfl:     atorvastatin (LIPITOR) 10 MG tablet, TAKE 1 TABLET BY MOUTH EVERY DAY, Disp: 90 tablet, Rfl: 0    metFORMIN (GLUCOPHAGE-XR) 500 MG extended release tablet, Take 1 tablet by mouth daily (with breakfast), Disp: 90 tablet, Rfl: 1    omeprazole (PRILOSEC) 20 MG delayed release capsule, TAKE 1 CAPSULE BY MOUTH EVERY DAY, Disp: 90 capsule, Rfl: 2    blood glucose test strips (TRUE METRIX BLOOD GLUCOSE TEST) strip, TEST 1 TIMES A DAY FOR SYMPTOMS OF IRREGULAR BLOOD GLUCOSE., Disp: 100 strip, Rfl: 3    APRISO 0.375 g extended release capsule, TAKE 4 CAPSULES BY MOUTH EVERY DAY, Disp: 360 capsule, Rfl: 0    verapamil (CALAN) 120 MG tablet, Take 120 mg by mouth daily, Disp: , Rfl:     TRUEPLUS LANCETS 30G MISC, Test 1 times a day for symptoms of irregular blood glucose., Disp: 100 each, Rfl: 3    albuterol sulfate HFA (PROAIR HFA) 108 (90 Base) MCG/ACT inhaler, Inhale 2 puffs into the lungs every 6 hours as needed for Wheezing, Disp: 1 Inhaler, Rfl: 3    meclizine (ANTIVERT) 25 MG tablet, TAKE 1 TABLET BY MOUTH THREE TIMES A DAY AS NEEDED FOR NAUSEA or dizziness , Disp: 60 tablet, Rfl: 1    promethazine (PHENERGAN) 25 MG tablet, TAKE 1/2 TABLET BY MOUTH EVERY SIX HOURS AS NEEDED FOR NAUSEA, Disp: 30 tablet, Rfl: 0    ondansetron (ZOFRAN-ODT) 4 MG disintegrating tablet, DISSOLVE 1 TABLET IN MOUTH EVERY EIGHT HOURS AS NEEDED FOR NAUSEA AND VOMITING, Disp: 30 tablet, Rfl: 0    Blood Glucose Monitoring Suppl SU, Inject 1 each into the lymphadenopathy. Musculoskeletal: Normal curvature of the spine. No gross muscle weakness. Extremities:  No lower extremity edema, ulcerations, tenderness, varicosities or erythema. Muscle size, tone and strength are normal.  No involuntary movements are noted. Skin:  Warm and dry. Good color, turgor and pigmentation. No lesions or scars. Neurological/Psychiatric: The patient's general behavior, level of consciousness, thought content and emotional status is normal.       Labs:    None      Assessment:    1. Mild persistent asthma without complication    2. Chronic seasonal allergic rhinitis due to pollen    3. Gastroesophageal reflux disease, esophagitis presence not specified    4. Obesity (BMI 30.0-34. 9)          PLAN:    Recommended. Asthma education   Refills were provided.-Flovent and Flonase  Patient was recommended to have prednisone and an antibiotic available for use during an exacerbation  Educated and clarified the medication use. Discussed use, benefit, and side effects of prescribed medications. Barriers to medication compliance addressed. Mayco Swenson received counseling on the following healthy behaviors: nutrition, exercise and medication adherence  Recommend flu vaccination in the fall annually. Recommendations given regarding pneumococcal vaccinations. Patient is up-to-date with vaccinations from pulmonary perspective. Maintain an active lifestyle. Patient was educated on how to use the respiratory medications. All the questions that the patient  has had were answere. Her ction. Home O2 evaluation to be done. Supplemental oxygen waNot needed. After reviewing the patient's smoking history and his age patient does not meet the criteria for lung cancer screening. We'll see the patient back in 12 months or earlier if needed  She will call us if she is sick and need to be seen sooner  Thank you for having us involved in the care of your patient.  Please call us if you have

## 2020-09-04 ENCOUNTER — HOSPITAL ENCOUNTER (OUTPATIENT)
Age: 70
Setting detail: SPECIMEN
Discharge: HOME | End: 2020-09-04
Payer: MEDICARE

## 2020-09-04 ENCOUNTER — HOSPITAL ENCOUNTER (OUTPATIENT)
Dept: WOMENS IMAGING | Age: 70
Discharge: HOME | End: 2020-09-06
Payer: MEDICARE

## 2020-09-04 LAB
ABSOLUTE EOS #: 0.08 K/UL (ref 0–0.44)
ABSOLUTE IMMATURE GRANULOCYTE: <0.03 K/UL (ref 0–0.3)
ABSOLUTE LYMPH #: 2.27 K/UL (ref 1.1–3.7)
ABSOLUTE MONO #: 0.44 K/UL (ref 0.1–1.2)
ALBUMIN SERPL-MCNC: 4 G/DL (ref 3.5–5.2)
ALBUMIN/GLOBULIN RATIO: 1.5 (ref 1–2.5)
ALP BLD-CCNC: 85 U/L (ref 35–104)
ALT SERPL-CCNC: 17 U/L (ref 5–33)
ANION GAP SERPL CALCULATED.3IONS-SCNC: 11 MMOL/L (ref 9–17)
AST SERPL-CCNC: 18 U/L
BASOPHILS # BLD: 0 % (ref 0–2)
BASOPHILS ABSOLUTE: <0.03 K/UL (ref 0–0.2)
BILIRUB SERPL-MCNC: 0.77 MG/DL (ref 0.3–1.2)
BUN BLDV-MCNC: 16 MG/DL (ref 8–23)
BUN/CREAT BLD: ABNORMAL (ref 9–20)
CALCIUM SERPL-MCNC: 9.1 MG/DL (ref 8.6–10.4)
CHLORIDE BLD-SCNC: 105 MMOL/L (ref 98–107)
CHOLESTEROL, FASTING: 153 MG/DL
CHOLESTEROL/HDL RATIO: 2.8
CO2: 24 MMOL/L (ref 20–31)
CREAT SERPL-MCNC: 0.65 MG/DL (ref 0.5–0.9)
CREATININE URINE: 154.6 MG/DL (ref 28–217)
DIFFERENTIAL TYPE: NORMAL
EOSINOPHILS RELATIVE PERCENT: 1 % (ref 1–4)
ESTIMATED AVERAGE GLUCOSE: 146 MG/DL
GFR AFRICAN AMERICAN: >60 ML/MIN
GFR NON-AFRICAN AMERICAN: >60 ML/MIN
GFR SERPL CREATININE-BSD FRML MDRD: ABNORMAL ML/MIN/{1.73_M2}
GFR SERPL CREATININE-BSD FRML MDRD: ABNORMAL ML/MIN/{1.73_M2}
GLUCOSE BLD-MCNC: 110 MG/DL (ref 70–99)
HBA1C MFR BLD: 6.7 % (ref 4–6)
HCT VFR BLD CALC: 43.2 % (ref 36.3–47.1)
HDLC SERPL-MCNC: 54 MG/DL
HEMOGLOBIN: 14 G/DL (ref 11.9–15.1)
IMMATURE GRANULOCYTES: 0 %
LDL CHOLESTEROL: 67 MG/DL (ref 0–130)
LYMPHOCYTES # BLD: 40 % (ref 24–43)
MCH RBC QN AUTO: 29.3 PG (ref 25.2–33.5)
MCHC RBC AUTO-ENTMCNC: 32.4 G/DL (ref 28.4–34.8)
MCV RBC AUTO: 90.4 FL (ref 82.6–102.9)
MICROALBUMIN/CREAT 24H UR: <12 MG/L
MICROALBUMIN/CREAT UR-RTO: NORMAL MCG/MG CREAT
MONOCYTES # BLD: 8 % (ref 3–12)
NRBC AUTOMATED: 0 PER 100 WBC
PDW BLD-RTO: 12.3 % (ref 11.8–14.4)
PLATELET # BLD: 248 K/UL (ref 138–453)
PLATELET ESTIMATE: NORMAL
PMV BLD AUTO: 11.8 FL (ref 8.1–13.5)
POTASSIUM SERPL-SCNC: 4.2 MMOL/L (ref 3.7–5.3)
RBC # BLD: 4.78 M/UL (ref 3.95–5.11)
RBC # BLD: NORMAL 10*6/UL
SEG NEUTROPHILS: 51 % (ref 36–65)
SEGMENTED NEUTROPHILS ABSOLUTE COUNT: 2.82 K/UL (ref 1.5–8.1)
SODIUM BLD-SCNC: 140 MMOL/L (ref 135–144)
TOTAL PROTEIN: 6.6 G/DL (ref 6.4–8.3)
TRIGLYCERIDE, FASTING: 162 MG/DL
VLDLC SERPL CALC-MCNC: ABNORMAL MG/DL (ref 1–30)
WBC # BLD: 5.7 K/UL (ref 3.5–11.3)
WBC # BLD: NORMAL 10*3/UL

## 2020-09-04 PROCEDURE — 77067 SCR MAMMO BI INCL CAD: CPT

## 2021-01-25 ENCOUNTER — TELEPHONE (OUTPATIENT)
Dept: PULMONOLOGY | Age: 71
End: 2021-01-25

## 2021-01-25 NOTE — TELEPHONE ENCOUNTER
Patient called and wanted to know results of her sleep study? Her cardiologist is assuming she has sleep apnea. She is stating that she have another sleep study? Please advise.  Thanks,

## 2021-01-25 NOTE — TELEPHONE ENCOUNTER
Sorry.  I do not see a sleep study under procedures. I see there was one ordered in 2018 by Gareth Toth but I see that not having been done.   Thank you

## 2021-01-26 NOTE — TELEPHONE ENCOUNTER
Spoke to patient and informed. She will call Dr. Megan Cai and Cardiologist. I let her know if she needed anything from us to call.

## 2021-11-10 ENCOUNTER — NURSE TRIAGE (OUTPATIENT)
Dept: OTHER | Facility: CLINIC | Age: 71
End: 2021-11-10

## 2021-11-10 NOTE — TELEPHONE ENCOUNTER
Received call from Kailee at [facility] with [employer]. Brief description of triage:   Shortness of breath with her cough, took a COVID test and it was negative    Triage indicates for patient to be seen in the office today, patient encouraged to go to the [facility] if no available appointments, can be done virtual    Care advice provided, patient verbalizes understanding; denies any other questions or concerns; instructed to call back for any new or worsening symptoms. Writer provided warm transfer to Harsh Washington at [facility] for appointment scheduling. Attention Provider: Thank you for allowing me to participate in the care of your patient. The patient was connected to triage in response to information provided to the [facility]/PSC. Please do not respond through this encounter as the response is not directed to a shared pool. Reason for Disposition   Patient wants to be seen    Answer Assessment - Initial Assessment Questions  1. RESPIRATORY STATUS: \"Describe your breathing? \" (e.g., wheezing, shortness of breath, unable to speak, severe coughing)       Shortness of breath    2. ONSET: \"When did this breathing problem begin? \"       Monday    3. PATTERN \"Does the difficult breathing come and go, or has it been constant since it started? \"       Comes and goes    4. SEVERITY: \"How bad is your breathing? \" (e.g., mild, moderate, severe)     - MILD: No SOB at rest, mild SOB with walking, speaks normally in sentences, can lay down, no retractions, pulse < 100.     - MODERATE: SOB at rest, SOB with minimal exertion and prefers to sit, cannot lie down flat, speaks in phrases, mild retractions, audible wheezing, pulse 100-120.     - SEVERE: Very SOB at rest, speaks in single words, struggling to breathe, sitting hunched forward, retractions, pulse > 120       Mild    5. RECURRENT SYMPTOM: \"Have you had difficulty breathing before? \" If so, ask: \"When was the last time? \" and \"What happened that time? \" No    6. CARDIAC HISTORY: \"Do you have any history of heart disease? \" (e.g., heart attack, angina, bypass surgery, angioplasty)       No    7. LUNG HISTORY: \"Do you have any history of lung disease? \"  (e.g., pulmonary embolus, asthma, emphysema)      Asthma      8. CAUSE: \"What do you think is causing the breathing problem? \"       Severe chest congestion    9. OTHER SYMPTOMS: \"Do you have any other symptoms? (e.g., dizziness, runny nose, cough, chest pain, fever)      Cough, runny nose    10. PREGNANCY: \"Is there any chance you are pregnant? \" \"When was your last menstrual period? \"        No    11. TRAVEL: \"Have you traveled out of the country in the last month? \" (e.g., travel history, exposures)        No    Protocols used: BREATHING DIFFICULTY-ADULT-OH

## 2021-11-18 ENCOUNTER — HOSPITAL ENCOUNTER (OUTPATIENT)
Age: 71
Setting detail: SPECIMEN
Discharge: HOME | End: 2021-11-18

## 2021-11-18 DIAGNOSIS — K51.919 ULCERATIVE COLITIS WITH COMPLICATION, UNSPECIFIED LOCATION (HCC): ICD-10-CM

## 2021-11-18 DIAGNOSIS — I10 ESSENTIAL HYPERTENSION: ICD-10-CM

## 2021-11-18 DIAGNOSIS — E78.00 PURE HYPERCHOLESTEROLEMIA: ICD-10-CM

## 2021-11-18 DIAGNOSIS — E11.9 TYPE 2 DIABETES MELLITUS WITHOUT COMPLICATION, WITHOUT LONG-TERM CURRENT USE OF INSULIN (HCC): ICD-10-CM

## 2021-11-18 LAB
ABSOLUTE EOS #: 0.15 K/UL (ref 0–0.44)
ABSOLUTE IMMATURE GRANULOCYTE: 0.09 K/UL (ref 0–0.3)
ABSOLUTE LYMPH #: 3.66 K/UL (ref 1.1–3.7)
ABSOLUTE MONO #: 0.68 K/UL (ref 0.1–1.2)
ALBUMIN SERPL-MCNC: 3.8 G/DL (ref 3.5–5.2)
ALBUMIN/GLOBULIN RATIO: 1.5 (ref 1–2.5)
ALP BLD-CCNC: 90 U/L (ref 35–104)
ALT SERPL-CCNC: 21 U/L (ref 5–33)
ANION GAP SERPL CALCULATED.3IONS-SCNC: 11 MMOL/L (ref 9–17)
AST SERPL-CCNC: 14 U/L
BASOPHILS # BLD: 0 % (ref 0–2)
BASOPHILS ABSOLUTE: 0.04 K/UL (ref 0–0.2)
BILIRUB SERPL-MCNC: 0.86 MG/DL (ref 0.3–1.2)
BUN BLDV-MCNC: 17 MG/DL (ref 8–23)
BUN/CREAT BLD: ABNORMAL (ref 9–20)
CALCIUM SERPL-MCNC: 9.4 MG/DL (ref 8.6–10.4)
CHLORIDE BLD-SCNC: 102 MMOL/L (ref 98–107)
CHOLESTEROL, FASTING: 162 MG/DL
CHOLESTEROL/HDL RATIO: 2.9
CO2: 26 MMOL/L (ref 20–31)
CREAT SERPL-MCNC: 0.78 MG/DL (ref 0.5–0.9)
CREATININE URINE: 143.2 MG/DL (ref 28–217)
DIFFERENTIAL TYPE: ABNORMAL
EOSINOPHILS RELATIVE PERCENT: 2 % (ref 1–4)
GFR AFRICAN AMERICAN: >60 ML/MIN
GFR NON-AFRICAN AMERICAN: >60 ML/MIN
GFR SERPL CREATININE-BSD FRML MDRD: ABNORMAL ML/MIN/{1.73_M2}
GFR SERPL CREATININE-BSD FRML MDRD: ABNORMAL ML/MIN/{1.73_M2}
GLUCOSE BLD-MCNC: 110 MG/DL (ref 70–99)
HCT VFR BLD CALC: 46 % (ref 36.3–47.1)
HDLC SERPL-MCNC: 55 MG/DL
HEMOGLOBIN: 14.9 G/DL (ref 11.9–15.1)
IMMATURE GRANULOCYTES: 1 %
LDL CHOLESTEROL: 75 MG/DL (ref 0–130)
LYMPHOCYTES # BLD: 40 % (ref 24–43)
MCH RBC QN AUTO: 29.4 PG (ref 25.2–33.5)
MCHC RBC AUTO-ENTMCNC: 32.4 G/DL (ref 28.4–34.8)
MCV RBC AUTO: 90.9 FL (ref 82.6–102.9)
MICROALBUMIN/CREAT 24H UR: <12 MG/L
MICROALBUMIN/CREAT UR-RTO: NORMAL MCG/MG CREAT
MONOCYTES # BLD: 8 % (ref 3–12)
NRBC AUTOMATED: 0 PER 100 WBC
PDW BLD-RTO: 12.6 % (ref 11.8–14.4)
PLATELET # BLD: 283 K/UL (ref 138–453)
PLATELET ESTIMATE: ABNORMAL
PMV BLD AUTO: 10.8 FL (ref 8.1–13.5)
POTASSIUM SERPL-SCNC: 4 MMOL/L (ref 3.7–5.3)
RBC # BLD: 5.06 M/UL (ref 3.95–5.11)
RBC # BLD: ABNORMAL 10*6/UL
SEG NEUTROPHILS: 49 % (ref 36–65)
SEGMENTED NEUTROPHILS ABSOLUTE COUNT: 4.46 K/UL (ref 1.5–8.1)
SODIUM BLD-SCNC: 139 MMOL/L (ref 135–144)
TOTAL PROTEIN: 6.4 G/DL (ref 6.4–8.3)
TRIGLYCERIDE, FASTING: 159 MG/DL
VLDLC SERPL CALC-MCNC: ABNORMAL MG/DL (ref 1–30)
WBC # BLD: 9.1 K/UL (ref 3.5–11.3)
WBC # BLD: ABNORMAL 10*3/UL

## 2021-11-23 ENCOUNTER — OFFICE VISIT (OUTPATIENT)
Dept: PULMONOLOGY | Age: 71
End: 2021-11-23
Payer: MEDICARE

## 2021-11-23 VITALS
SYSTOLIC BLOOD PRESSURE: 130 MMHG | DIASTOLIC BLOOD PRESSURE: 70 MMHG | HEART RATE: 41 BPM | RESPIRATION RATE: 16 BRPM | OXYGEN SATURATION: 98 % | HEIGHT: 61 IN | WEIGHT: 163 LBS | TEMPERATURE: 97.2 F | BODY MASS INDEX: 30.78 KG/M2

## 2021-11-23 DIAGNOSIS — R09.82 POST-NASAL DRIP: ICD-10-CM

## 2021-11-23 DIAGNOSIS — J45.40 MODERATE PERSISTENT ASTHMA WITHOUT COMPLICATION: Primary | ICD-10-CM

## 2021-11-23 DIAGNOSIS — R00.1 SINUS BRADYCARDIA: ICD-10-CM

## 2021-11-23 DIAGNOSIS — J30.1 CHRONIC SEASONAL ALLERGIC RHINITIS DUE TO POLLEN: ICD-10-CM

## 2021-11-23 DIAGNOSIS — K21.9 GASTROESOPHAGEAL REFLUX DISEASE, UNSPECIFIED WHETHER ESOPHAGITIS PRESENT: ICD-10-CM

## 2021-11-23 DIAGNOSIS — E66.09 OBESITY DUE TO EXCESS CALORIES, UNSPECIFIED OBESITY SEVERITY: ICD-10-CM

## 2021-11-23 PROCEDURE — G8428 CUR MEDS NOT DOCUMENT: HCPCS | Performed by: INTERNAL MEDICINE

## 2021-11-23 PROCEDURE — 1036F TOBACCO NON-USER: CPT | Performed by: INTERNAL MEDICINE

## 2021-11-23 PROCEDURE — G8417 CALC BMI ABV UP PARAM F/U: HCPCS | Performed by: INTERNAL MEDICINE

## 2021-11-23 PROCEDURE — 1123F ACP DISCUSS/DSCN MKR DOCD: CPT | Performed by: INTERNAL MEDICINE

## 2021-11-23 PROCEDURE — 99214 OFFICE O/P EST MOD 30 MIN: CPT | Performed by: INTERNAL MEDICINE

## 2021-11-23 PROCEDURE — 3017F COLORECTAL CA SCREEN DOC REV: CPT | Performed by: INTERNAL MEDICINE

## 2021-11-23 PROCEDURE — 4040F PNEUMOC VAC/ADMIN/RCVD: CPT | Performed by: INTERNAL MEDICINE

## 2021-11-23 PROCEDURE — G8484 FLU IMMUNIZE NO ADMIN: HCPCS | Performed by: INTERNAL MEDICINE

## 2021-11-23 PROCEDURE — G8400 PT W/DXA NO RESULTS DOC: HCPCS | Performed by: INTERNAL MEDICINE

## 2021-11-23 PROCEDURE — 1090F PRES/ABSN URINE INCON ASSESS: CPT | Performed by: INTERNAL MEDICINE

## 2021-11-23 RX ORDER — FLUTICASONE PROPIONATE 50 MCG
2 SPRAY, SUSPENSION (ML) NASAL DAILY
Qty: 1 EACH | Refills: 5 | Status: SHIPPED | OUTPATIENT
Start: 2021-11-23 | End: 2022-03-29

## 2021-11-23 ASSESSMENT — SLEEP AND FATIGUE QUESTIONNAIRES
HOW LIKELY ARE YOU TO NOD OFF OR FALL ASLEEP IN A CAR, WHILE STOPPED FOR A FEW MINUTES IN TRAFFIC: 0
HOW LIKELY ARE YOU TO NOD OFF OR FALL ASLEEP WHILE SITTING INACTIVE IN A PUBLIC PLACE: 0
HOW LIKELY ARE YOU TO NOD OFF OR FALL ASLEEP WHILE SITTING QUIETLY AFTER LUNCH WITHOUT ALCOHOL: 0
HOW LIKELY ARE YOU TO NOD OFF OR FALL ASLEEP WHILE WATCHING TV: 0
HOW LIKELY ARE YOU TO NOD OFF OR FALL ASLEEP WHILE SITTING AND TALKING TO SOMEONE: 0
ESS TOTAL SCORE: 0
HOW LIKELY ARE YOU TO NOD OFF OR FALL ASLEEP WHEN YOU ARE A PASSENGER IN A CAR FOR AN HOUR WITHOUT A BREAK: 0
HOW LIKELY ARE YOU TO NOD OFF OR FALL ASLEEP WHILE LYING DOWN TO REST IN THE AFTERNOON WHEN CIRCUMSTANCES PERMIT: 0
HOW LIKELY ARE YOU TO NOD OFF OR FALL ASLEEP WHILE SITTING AND READING: 0

## 2021-11-25 NOTE — PROGRESS NOTES
PULMONARY OP  PROGRESS NOTE      Patient:  Caridad Cruz  YOB: 1950    MRN: [id number]     Acct:        Pt seen and Chart reviewed. Ms. Caridad Cruz is here in followup for   1. Moderate persistent asthma without complication    2. Post-nasal drip    3. Gastroesophageal reflux disease, unspecified whether esophagitis present    4. Obesity due to excess calories, unspecified obesity severity    5. Sinus bradycardia    6. Chronic seasonal allergic rhinitis due to pollen          Pt has not been hospitalized but has been to the emergency room for acute asthma  Has been using meds as recommended. The patient denied having any significant cough. No shortness of breath or wheezing  She feels the Breo made a difference  She hardly needs any albuterol  No chest pain or pressure. No acid reflux symptoms. Has some post nasal discharge and she stopped using Flonase.   She has been working on losing weight    Subjective:  ROS    Review of Systems -   General ROS: Completed and except as mentioned above were negative   Psychological ROS:  Completed and except as mentioned above were negative  Ophthalmic ROS:  Completed and except as mentioned above were negative  ENT ROS:  Completed and except as mentioned above were negative  Allergy and Immunology ROS:  Completed and except as mentioned above were negative  Hematological and Lymphatic ROS:  Completed and except as mentioned above were negative  Endocrine ROS: Completed and except as mentioned above were negative  Respiratory ROS:  Completed and except as mentioned above were negative  Cardiovascular ROS:  Completed and except as mentioned above were negative  Gastrointestinal ROS: Completed and except as mentioned above were negative  Genito-Urinary ROS:  Completed and except as mentioned above were negative  Musculoskeletal ROS:  Completed and except as mentioned above were negative  Neurological ROS:  Completed and except as mentioned above were negative  Dermatological ROS:  Completed and except as mentioned above were negative          Allergies:  No Known Allergies    Medications:    Current Outpatient Medications:     Fluticasone furoate-vilanterol (BREO ELLIPTA) 200-25 MCG/INH AEPB inhaler, Inhale 1 puff into the lungs daily, Disp: 1 each, Rfl: 5    fluticasone (FLONASE) 50 MCG/ACT nasal spray, 2 sprays by Nasal route daily, Disp: 1 each, Rfl: 5    doxycycline hyclate (VIBRAMYCIN) 100 MG capsule, , Disp: , Rfl:     BREO ELLIPTA 100-25 MCG/INH AEPB inhaler, , Disp: , Rfl:     benzonatate (TESSALON) 100 MG capsule, Take 1-2 capsules by mouth 3 times daily as needed for Cough, Disp: 60 capsule, Rfl: 0    albuterol sulfate HFA (PROVENTIL HFA) 108 (90 Base) MCG/ACT inhaler, Inhale 2 puffs into the lungs every 6 hours as needed for Wheezing or Shortness of Breath, Disp: 18 g, Rfl: 1    omeprazole (PRILOSEC) 20 MG delayed release capsule, TAKE 1 CAPSULE BY MOUTH EVERY DAY, Disp: 90 capsule, Rfl: 1    metFORMIN (GLUCOPHAGE-XR) 500 MG extended release tablet, TAKE 1 TABLET BY MOUTH EVERY DAY WITH BREAKFAST, Disp: 90 tablet, Rfl: 0    atorvastatin (LIPITOR) 10 MG tablet, TAKE 1 TABLET BY MOUTH EVERY DAY, Disp: 90 tablet, Rfl: 0    cephALEXin (KEFLEX) 500 MG capsule, Take 1 capsule by mouth 2 times daily (Patient not taking: Reported on 11/17/2021), Disp: 14 capsule, Rfl: 0    fluticasone (FLOVENT HFA) 110 MCG/ACT inhaler, Inhale 1 puff into the lungs 2 times daily, Disp: 1 Inhaler, Rfl: 5    Efinaconazole 10 % SOLN, Apply 1 Applicatorful topically daily (Patient not taking: Reported on 11/17/2021), Disp: 8 mL, Rfl: 2    metoprolol succinate (TOPROL XL) 50 MG extended release tablet, TAKE 1 TABLET BY MOUTH EVERY DAY, Disp: , Rfl:     blood glucose test strips (TRUE METRIX BLOOD GLUCOSE TEST) strip, TEST 1 TIMES A DAY FOR SYMPTOMS OF IRREGULAR BLOOD GLUCOSE., Disp: 100 strip, Exam    Constitutional: This is a well developed, well nourish. Her BMI was, 30-34.9 - Obesity Grade I 80y.o. year old female who is alert, oriented, cooperative and in no apparent distress. Head:normocephalic and atraumatic. EENT:   ADAM. No conjunctival injections. Septum was midline, mucosa was without erythema, exudates or cobblestoning. No thrush was noted. Mallampati II (soft palate, uvula, fauces visible)  Neck: Supple without thyromegaly. No elevated JVP. Trachea was midline. Respiratory: Chest was symmetrical without dullness to percussion. Breath sounds bilaterally were clear to auscultation. There were no wheezes, rhonchi or rales. There is no intercostal retraction or use of accessory muscles. No egophony noted. Cardiovascular: Regular without murmur, clicks, gallops or rubs. Abdomen: Slightly rounded and soft without organomegaly. No rebound tenderness, rigidity or guarding was appreciated. Lymphatic: No lymphadenopathy. Musculoskeletal: Normal curvature of the spine. No gross muscle weakness. Extremities:  No lower extremity edema, ulcerations, tenderness, varicosities or erythema. Muscle size, tone and strength are normal.  No involuntary movements are noted. Skin:  Warm and dry. Good color, turgor and pigmentation. No lesions or scars. Neurological/Psychiatric: The patient's general behavior, level of consciousness, thought content and emotional status is normal.       Labs:    Chest x-ray was done at [address] [place] [address] in September 2021 that showed bibasilar subsegmental linear atelectasis    Chest x-ray was also done in November 2021 that showed left basilar discoid atelectasis and enlarged cardiac silhouette and a repeat x-ray on the same day showed the same findings      Assessment:    1. Moderate persistent asthma without complication    2. Post-nasal drip    3. Gastroesophageal reflux disease, unspecified whether esophagitis present    4.  Obesity due to excess calories, unspecified obesity severity    5. Sinus bradycardia    6. Chronic seasonal allergic rhinitis due to pollen    Regarding sinus bradycardia, patient is being seen by EP cardiology at [address] [place] [address] and they are well aware of her bradycardia. PLAN:    Reviewed the chest x-rays with the patient  Recommended. Asthma education   Refills were provided. [place] and Flonase  If the patient but will do well with the [facility], after a month it can be deescalated and Flovent restarted  Patient was recommended to have prednisone and an antibiotic available for use during an exacerbation  Educated and clarified the medication use. Discussed use, benefit, and side effects of prescribed medications. Barriers to medication compliance addressed. Magalis Larson received counseling on the following healthy behaviors: nutrition, exercise and medication adherence  Recommend flu vaccination in the fall annually. Recommendations given regarding pneumococcal vaccinations. Had the COVID-19 vaccination  Patient is up-to-date with vaccinations from pulmonary perspective. Maintain an active lifestyle. Patient was educated on how to use the respiratory medications. All the questions that the patient  has had were answered to her satisfaction  Home O2 evaluation to be done. Supplemental oxygen was not needed. After reviewing the patient's smoking history and her age patient does not meet the criteria for lung cancer screening. We'll see the patient back in 6 months or earlier if needed  She will call us if she is sick and need to be seen sooner  Thank you for having us involved in the care of your patient. Please call us if you have any questions or concerns.         Rachel Romero MD, MD             11/25/2021, 6:34 PM

## 2021-12-08 ENCOUNTER — OFFICE VISIT (OUTPATIENT)
Dept: PULMONOLOGY | Age: 71
End: 2021-12-08

## 2021-12-08 DIAGNOSIS — J45.909 ASTHMA, UNSPECIFIED ASTHMA SEVERITY, UNSPECIFIED WHETHER COMPLICATED, UNSPECIFIED WHETHER PERSISTENT: Primary | ICD-10-CM

## 2021-12-08 NOTE — PROGRESS NOTES
Asthma Ed. Went over inhalers with patient, when to use Flovent and went to use Albuterol. Patient did not have a spacer so provided spacer to patient and went over proper technique to use it. Also went over triggers with patient.

## 2021-12-30 ENCOUNTER — HOSPITAL ENCOUNTER (OUTPATIENT)
Dept: PHYSICAL THERAPY | Age: 71
Setting detail: THERAPIES SERIES
Discharge: HOME | End: 2021-12-30
Payer: MEDICARE

## 2021-12-30 PROCEDURE — 97162 PT EVAL MOD COMPLEX 30 MIN: CPT

## 2021-12-30 NOTE — PROGRESS NOTES
[address] [facility]  [address] [facility]         Phone: [phone]       Fax: [phone]    Physical Therapy Spine Evaluation    Date:  2021  Patient: Brigitte Tan  : 1950  MRN: [id number]  Physician: Kristofer Laguna, APRN - CNP     Medical Diagnosis: Low back strain, initial encounter (R02.340W)    Clinical Diagnosis: Low back/SI pain with motor control impairments  Onset date: 2021    Next 's appt.: 2022   PT Visit Information  PT Insurance Information: [employer] and [employer]   Total # of Visits Approved: 18   Total # of Visits to Date: 1  No Show: 0  Canceled Appointment: 0     Subjective  CC: Low back pain  HPI: (2021) History of low back pain with (L) buttocks pain. No direct trauma to this region was reported. However, pt stated that she broke her great toe on Thanksgiving and has had pain since. Progressively got worse over the next several weeks and became constant. Patient stated that she has tried OTC pain medication with little success. Rest and heat appear to relieve her symptoms slightly. She elected to have a virtual visit with her PCP. Oral medication(s) were provided including Zanaflex and Deltasone along with outpt PT ordered. PMHx: [] Unremarkable [x] Diabetes [x] HTN  [] Pacemaker   [] MI/Heart Problems [] Cancer [] Arthritis   [x] Other:   Colitis, ulcerative ([facility] [address] Meniere disease; Anginal pain ([facility] [address] GERD (gastroesophageal reflux disease); Obesity (BMI 30.0-34.9);  Bronchitis; Vitamin D deficiency; Ulcerative pancolitis without complication ([facility] [address] Hyperlipidemia              [x] Refer to full medical chart  In EPIC     Comorbidities  [x] Obesity [] Dialysis  [] Other:   [] Asthma/COPD [] Dementia [] Other:   [] Stroke [] Sleep apnea [] Other:   [] Vascular disease [] Rheumatic disease [] Other:     Tests: [] X-Ray: [] MRI:  [] Other:     Medications: [x] Refer to full medical record [] None [] Other:  Allergies:      [x] Refer to full medical record [] None [] Other:     Normal Deficit Comments   Follows commands [x] []    Vision [x] []    Hearing [x] []    Orientation [x] []      Pain  Pain:  [x] Yes   [] No      Location: low back, (L) buttocks and hip region  Pain Rating: (0-10 scale) 0/10   Worst: 5/10  Best: 0/10    Symptoms:  [x] Improving [] Worsening       [] Same  Descriptors: \"intermittent\" soreness  Aggravating factors: Prolonged sitting, leaning forward, standing/walking   Relieving factors: Rest, Repositioning, Heat   Sleep: Not Disturbed  Other:     Function  Hand Dominance  [x] Right  [] Left  Working:  [] Normal Duty  [] Light Duty  [] Off D/T Condition  [x] Retired    [] Not Employed    []  Disability  [] Other:           Return to work:   Job/ADL Description:    ADL/IADL Previous level of function Current level of function Who currently assists the patient with task/Comments   Bathing  [x] Independent  [] Assist [x] Independent  [] Assist    Dress/grooming [x] Independent  [] Assist [x] Independent  [] Assist    Transfer/mobility [x] Independent  [] Assist [x] Independent  [] Assist    Feeding [x] Independent  [] Assist [x] Independent  [] Assist    Toileting [x] Independent  [] Assist [x] Independent  [] Assist    Driving [x] Independent  [] Assist [x] Independent  [] Assist    Housekeeping [x] Independent  [] Assist [x] Independent  [] Assist    Grocery shop/meal prep [x] Independent  [] Assist [x] Independent  [] Assist      Gait Prior level of function Current level of function    [x] Independent  [] Assist [x] Independent  [] Assist   Device: [x] Independent [x] Independent    [] Straight Cane [] Quad cane [] Straight Cane [] Quad cane    [] Standard walker [] Rolling walker   [] 4 wheeled walker [] Standard walker [] Rolling walker   [] 4 wheeled walker    [] Wheelchair [] Wheelchair       Objective  Observation/Palpation   Normal Deficit Comments Observation [x] []    Posture  [] [x] Forward head, rounded shoulders, anterior pelvic tilt with increased lordosis, (B) genu recurvatum    Palpation [] [x] Tenderness to the touch was noted @ the (L) SI joint and (L) greater trochanter    Edema [] []    Scar [] []    Other [] []      Range of Motion  Spine - Range of Motion  Cervical  WNL in all a planes  Thoracic  WNL in all planes   Lumbar  Flexion - WNL with pain @ end range (L PSIS region) Extension - WNL with pain @ end range (L PSIS region) (L) side bend - WNL with pain @ end range, (R) side bend - WNL    SI joint intact with movement    Right Lower Extremity - Passive ROM  Hip flexion  WNL  Hip extension  WNL  Hip abduction  WNL  Hip adduction  WNL  Hip ER  WNL  Hip IR  WNL  Knee flexion  WNL  Knee extension  WNL  Dorsiflexion  WNL  Plantarflexion  WNL    Left Lower Extremity - Passive ROM  Hip flexion  WNL  Hip extension  WNL  Hip abduction  WNL  Hip adduction  WNL  Hip ER  WNL with pain @ end range  Hip IR  WNL with pain @ end range  Knee flexion  WNL  Knee extension  WNL  Dorsiflexion  WNL  Plantarflexion  WNL    Strength  Spine Normal Deficit Comments   Anterior chain   [] [x] Lacking 90% with anterior \"bridge\"   Posterior chain   [] [] N/A - unable to lay prone   Lateral chain   [] [x]  (L) hip abduction - 2+/5 to 3-/5 MMT, (R) hip abduction - 5/5 MMT     Right Lower Extremity - Strength  Hip flexion  5/5 MMT  Hip extension  5/5 MMT  Hip abduction  5/5 MMT  Hip adduction  5/5 MMT  Hip ER  5/5 MMT  Hip IR  5/5 MMT  Knee flexion  5/5 MMT  Knee extension  5/5 MMT  Dorsiflexion  5/5 MMT  Plantarflexion  5/5 MMT    Left Lower Extremity - Strength  Hip flexion  3/5 MMT  Hip extension  4/5 MMT  Hip abduction  2+/5 to 3-/5 MMT   Hip adduction  4/5 MMT  Hip ER  3-/5 MMT  Hip IR  3/5 MMT  Knee flexion  5/5 MMT  Knee extension  5/5 MMT  Dorsiflexion  5/5 MMT  Plantarflexion  5/5 MMT    Additional Measures    Normal Deficit Comments   Sensation   [x] [] L2-S1 with light touch (R) = (L)    Reflexes   [] []    Gross motor   [] []    Flexibility    [] [x] IT band tightness on the (L)  (L) hamstring tightness, no gastroc tightness or quad tightness    Special Tests   [] [x] (+) OFELIA on the (L)   (L) hip distraction relieved symptoms  (-) seated hip IR test    strength   [] []    Other   [] []      Gait Assessment  Assistive device: None   Comments: No gait deviations were grossly noted. Balance  N/A    Assessment  Patient presented with mild irritability within the (L) PSIS/SI region. Tenderness to the touch was apparent within the (L) SI and greater trochanter region of the (L) hip. Tightness was apparent with the (L) IT band and hamstrings. Pain was apparent at end range within the transverse plane of the (L) hip. (+) OFELIA test, (-) Seated IR test with hip distraction relieving her symptoms. Core weakness was apparent within the anterior and lateral chains along with (L) hip in all planes of motion. Unable to assess the posterior chain due to the patient's inability to lay prone. Given these clinical findings, the (L) hip/SI region may have some involvement regarding her current symptoms. A clinical diagnosis of low back/SI pain with motor control impairments would be indicated. The patient would continue to benefit from skilled physical therapy that included therapeutic exercise for core/LE strengthening. Problems:    [x] ? Pain: 0-5/10 low back/(L) SI region     [x] ? Strength:  Core/(L) LE  weakness  [x] ? Function: Modified Oswestry Low Back Disability Index = 22%   [x] Postural Deviations: Forward head, rounded shoulders, anterior pelvic tilt with increased lordosis, (B) genu recurvatum   [x] Gait Deviations: No gait deviations were grossly noted. [] Other:      Goals  STG: (to be met in 9 treatments)  1. Patient will report an average pain level of 0-3/10 within the low back/(L) SI region while performing her ADLs.      LTG: (to be met in 25 Treatments)  1. Patient will report an average pain level of 0-2/10 within the low back/(L) SI region while performing her ADLs. 2. Patient will demonstrate improved strength within all involved planes to assist with (I) function. 3. Modified Oswestry Low Back Disability Index improved by 10% (MDC)  4. Patient will demonstrate independence with her home exercise program.                Patient goals: Strengthen muscles    Functional Assessment Used: Modified Oswestry Low Back Disability Index  Current Status: Score: 22%  Goal Status: Score: 12%    Evaluation Complexity: Moderate     History: Obesity, FABQ - 30 on the physical activity sub scale (red flag @ 14 or greater with regard to fear of movement)  Exam: Pain level 0-5/10, core/(L) LE weakness  Clinical Presentation: Patient's symptoms are evolving. Barriers to Learning: None    Rehab Potential:  [x] Good  [] Fair  [] Poor   Suggested Professional Referral:  [x] No  [] Yes:  Barriers to Goal Achievement[de-identified]  [x] No  [] Yes:  Domestic Concerns:  [x] No  [] Yes:    Pt. Education:  [] Plans/Goals, Risks/Benefits discussed    [x] Home exercise program: Supine Hook lying Posterior Pelvic Tilt 2 sec hold x 10 reps (Daily 2-3 sessions)    Method of Education: [x] Verbal  [x] Demo  [] Written  Comprehension of Education:  [] Verbalizes understanding. [] Demonstrates understanding. [x] Needs Review. [] Demonstrates/verbalizes understanding of HEP/Ed previously given.     Treatment Plan:  [x] Therapeutic Exercise   20813  [] Iontophoresis: 4 mg/mL Dexamethasone Sodium Phosphate  mAmin  14797   [] Therapeutic Activity  79390 [] Vasopneumatic cold with compression  51193    [] Gait Training   40959 [] Ultrasound   26226   [] Neuromuscular Re-education  43215 [] Electrical Stimulation Unattended  42792   [] Manual Therapy  23621 [] Electrical Stimulation Attended  57491   [x] Instruction in HEP  [] Lumbar/Cervical Traction  [id number]   [] Aquatic Therapy   [id number] [x] Cold/hotpack    [] Massage   [id number]      [] Dry Needling, 1 or 2 muscles  69274   [] Biofeedback, first 15 minutes   22617  [] Biofeedback, additional 15 minutes   73026 [] Dry Needling, 3 or more muscles  19852     []  Medication allergies reviewed for use of    Dexamethasone Sodium Phosphate 4mg/ml     with iontophoresis treatments. Pt is not allergic. Frequency:  2 x/week for 18  visits      Todays Treatment:  Modalities:   Precautions: Unable to lay flat on a treatment table due to Meniere disease  Exercises:  Exercise Reps/ Time Weight/ Level Comments                                 Other:    Treatment Charges: Mins Units   [] Evaluation       []  Low       [x]  Moderate       []  High 45 1   []  Modalities     [x]  Ther Exercise 10 1   []  Manual Therapy     []  Ther Activities     []  Aquatics     []  Neuromuscular     []  Gait Training     []  Dry Needling           1-2 muscles     []  Dry Needling           3 or more          muscles     [] Vasocompression     []  Other         TOTAL TREATMENT TIME: 55    Time in: 4719  Time Out: 1210    Electronically signed by: Edwardo Grant PT DPT     Physician Signature:________________________________Date:__________________  By signing above or cosigning this note, I have reviewed this plan of care and certify a need for medically necessary rehabilitation services.      *PLEASE SIGN ABOVE AND FAX BACK ALL PAGES*

## 2022-01-07 ENCOUNTER — HOSPITAL ENCOUNTER (OUTPATIENT)
Dept: PHYSICAL THERAPY | Age: 72
Setting detail: THERAPIES SERIES
Discharge: HOME | End: 2022-01-07
Payer: MEDICARE

## 2022-01-07 PROCEDURE — 97110 THERAPEUTIC EXERCISES: CPT

## 2022-01-07 NOTE — PROGRESS NOTES
[facility] Fall Risk Assessment    Name: Sharita Nelsonour  Risk Factor Scale  Score   History of Falls [] Yes  [x] No 25  0 0   Secondary Diagnosis [] Yes  [x] No 15  0 0   Ambulatory Aid [] Furniture  [] Crutches/cane/walker  [x] None/bedrest/wheelchair/nurse 30  15  0 0   IV/Heparin Lock [] Yes  [x] No 20  0 0   Gait/Transferring [] Impaired  [] Weak  [x] Normal/bedrest/immobile 20  10  0 0   Mental Status [] Forgets limitations  [x] Oriented to own ability 15  0 0      Total: 0     Based on the Assessment score: check the appropriate box.     [x]  No intervention needed   Low =   Score of 0-24    []  Use standard prevention interventions Moderate =  Score of 24-44   [] Give patient handout and discuss fall prevention strategies   [] Establish goal of education for patient/family RE: fall prevention strategies    []  Use high risk prevention interventions High = Score of 45 and higher   [] Give patient handout and discuss fall prevention strategies   [] Establish goal of education for patient/family Re: fall prevention strategies   [] Discuss lifeline / other resources    Electronically signed by:   Edwardo Grant PT DPT  Date: 12/30/2021

## 2022-01-07 NOTE — PROGRESS NOTES
[address] [facility]   [address]   Phone: [phone]   Fax: [phone]    Physical Therapy Daily Treatment Note    Date:  2022  Patient: Lyndsey Kaye  : 1950  MRN: [id number]  Physician: ELSIE Rivero CNP   Medical Diagnosis:  Low back strain, initial encounter (S39.012A)    Clinical Diagnosis: Low back/SI pain with motor control impairments  Onset date: 2021   Next 's appt.: 2022  PT Visit Information  PT Insurance Information: [employer] and [employer]   Total # of Visits Approved: 18  Total # of Visits to Date: 2  No Show: 0  Canceled Appointment: 0    Subjective  Patient stated that she is moving a little easier with her ADLs. However, the pain is present within the same region(s) per the evaluation. Pain:  [x] Yes   [] No      Location: low back, (L) buttocks and hip region  Pain Ratin/10  Worst: 5/10  Best: 0/10  Descriptors: \"intermittent\" soreness  Other:     Objective  Modalities:    Precautions: Meniere disease - unable to completely flat on a treatment table  Exercises:  Exercise Reps/ Time Weight/ Level Comments   Supine Hook lying Posterior Pelvic Tilt  10 reps with 2 sec hold      Supine (L)  Hook lying Posterior Pelvic Tilt with SLR 10 reps      Side lying (L) Hip Abduction  5 reps x 4 sets     Side lying (L) Hip Clamshells  5 reps x 4 sets      Side lying (L) Hip Reverse Clamshells  5 reps x 4 sets        Pt. Education:  [x] Yes  [] No  [x] Reviewed Prior HEP/Ed  Method of Education: [] Verbal  [] Demo  [] Written  HEP:   Comprehension of Education:  [] Verbalizes understanding. [] Demonstrates understanding. [] Needs review. [x] Demonstrates/verbalizes HEP/Ed previously given. Assessment  Initiated an open chain exercise program to include core/(L) LE strengthening.  Patient is improving as evidence by her having no pain at end range with ER and IR of the (L) hip today. No (L) hip IT band tightness or hamstring tightness. Patient became \"dizzy and nausea\" with position changes (R) side lying to prone. Patient was able to sit up and her symptoms did improve prior to leaving. Goals  STG: (to be met in 9 treatments)  1. Patient will report an average pain level of 0-3/10 within the low back/(L) SI region while performing her ADLs.    LTG: (to be met in 18  Treatments)  1. Patient will report an average pain level of 0-2/10 within the low back/(L) SI region while performing her ADLs. 2. Patient will demonstrate improved strength within all involved planes to assist with (I) function. 3. Modified Oswestry Low Back Disability Index improved by 10% (MDC)  4. Patient will demonstrate independence with her home exercise program.                Patient goals: Strengthen muscles     Plan: [x] Continue per plan of care. [x] Other: Plan to change to a standing closed chain strengthening for the (L) hip/LE given her inability to lay flat/dizziness with positional changes in a lying position.         Treatment Charges: Mins Units   []  Modalities     [x]  Ther Exercise 30 2   []  Manual Therapy     []  Ther Activities     []  Aquatics     []  Neuromuscular     [] Vasocompression     [] Gait Training     [] Dry needling        [] 1 or 2 muscles        [] 3 or more muscles     []  Other     Total Treatment time 30 2     Time In: 0830          Time Out: 0900    Electronically signed by:  Nain Steele PT DPT

## 2022-01-10 ENCOUNTER — HOSPITAL ENCOUNTER (OUTPATIENT)
Dept: PHYSICAL THERAPY | Age: 72
Setting detail: THERAPIES SERIES
Discharge: HOME | End: 2022-01-10
Payer: MEDICARE

## 2022-01-10 PROCEDURE — 97110 THERAPEUTIC EXERCISES: CPT

## 2022-01-10 NOTE — PROGRESS NOTES
[facility]   [address]   Phone: [phone]   Fax: [phone]    Physical Therapy Daily Treatment Note    Date:  1/10/2022  Patient: Devante Leyva  : 1950  MRN: [id number]  Physician: ELSIE Hdez CNP   Medical Diagnosis:  Low back strain, initial encounter (S39.012A)    Clinical Diagnosis: Low back/SI pain with motor control impairments  Onset date: 2021   Next Dr's appt.: 2022  PT Visit Information  PT Insurance Information: [employer] and [employer]   Total # of Visits Approved: 18  Total # of Visits to Date: 3  No Show: 0  Canceled Appointment: 0    Subjective  States she was very ill/nauseated after last visit due to rolling and lying flat last visit- States she has meniere's disease and can not side lye right or flat due to provoking of symptoms. Took 1/2 meclizine prior to PT this date. Denies pain over weekend and upon arrival this date- feels she is slowly getting back to  Normal household activities. Still feels she is limited with prolonged sitting, standing and walking.  Only uses heat as needed- denies use of muscle relaxors     Pain:  [x] Yes   [] No      Location: low back, (L) buttocks and hip region  Pain Ratin/10  Worst: 8-9/10  Best: 0/10  Descriptors: \"intermittent\" soreness  Other:     Objective  Modalities:    Precautions: Meniere disease - unable to completely flat on a treatment table (elevated head of bed to~30* and 2 pillows) and unable to side lye right  Exercises:  Exercise Reps/ Time Weight/ Level Comments   Supine Hook lying Posterior Pelvic Tilt  10 reps with 2 sec hold      Supine (L)  Hook lying Posterior Pelvic Tilt with SLR 10 reps x 2 sets      Supine Hook Lying Hip Flexion () 10 reps x 2 sets     Supine Hook lying (L) Hip Abd 10 reps x 2 sets Red T-Band    Supine  (L) LE Hip Abd (with slight hip flexion) 10 reps           Seated Hip IR/ER 10 reps x 2 sets each direction                 Standing Squat 10 reps     Standing 3-Way hip (B) LE 10 reps             Passive Stretching:  Seated (L) Hamstring Stretch 30 second hold x 3 reps    Pt. Education:  [x] Yes  [] No  [x] Reviewed Prior HEP/Ed  Method of Education: [x] Verbal  [x] Demo  [x] Written  HEP: Exercise listed above and Red t-band  Comprehension of Education:  [x] Verbalizes understanding. [x] Demonstrates understanding. [] Needs review. [] Demonstrates/verbalizes HEP/Ed previously given. Assessment  Emphasis on core stability and proper pelvic positioning with all exercise. Modified program to tolerance due to inability to side lye right. Also progressed with standing exercise as noted above. Patient denies increased pain during program- just notes muscle fatigue more with standing than supine. Issued written HEP for patient to complete daily. The patient would continue to benefit from skilled physical therapy that included therapeutic exercise for core/LE strengthening. Goals  STG: (to be met in 9 treatments)  1. Patient will report an average pain level of 0-3/10 within the low back/(L) SI region while performing her ADLs. LTG: (to be met in 18  Treatments)  1. Patient will report an average pain level of 0-2/10 within the low back/(L) SI region while performing her ADLs. 2. Patient will demonstrate improved strength within all involved planes to assist with (I) function. 3. Modified Oswestry Low Back Disability Index improved by 10% (MDC)  4. Patient will demonstrate independence with her home exercise program.                Patient goals: Strengthen muscles     Plan: [x] Continue per plan of care.    [] Other:       Treatment Charges: Mins Units   []  Modalities     [x]  Ther Exercise 38 3   []  Manual Therapy     []  Ther Activities     []  Aquatics     []  Neuromuscular     [] Vasocompression     [] Gait Training     [] Dry needling        [] 1 or 2 muscles        [] 3 or more muscles     []  Other     Total Treatment time 38 3     Time In: 120 AM          Time Out: 825 AM    Electronically signed by:  Lila Monk PTA

## 2022-01-12 ENCOUNTER — HOSPITAL ENCOUNTER (OUTPATIENT)
Dept: PHYSICAL THERAPY | Age: 72
Setting detail: THERAPIES SERIES
Discharge: HOME | End: 2022-01-12
Payer: MEDICARE

## 2022-01-12 PROCEDURE — 97110 THERAPEUTIC EXERCISES: CPT

## 2022-01-12 NOTE — PROGRESS NOTES
[address] [facility]   [address]   Phone: [phone]   Fax: [phone]    Physical Therapy Daily Treatment Note    Date:  2022  Patient: Lety Gauthier  : 1950  MRN: [id number]  Physician: VIBHA Rock- CNP   Medical Diagnosis:  Low back strain, initial encounter (S39.012A)    Clinical Diagnosis: Low back/SI pain with motor control impairments  Onset date: 2021   Next 's appt.: 2022  PT Visit Information  PT Insurance Information: [employer] and [employer]   Total # of Visits Approved: 18  Total # of Visits to Date: 4  No Show: 0  Canceled Appointment: 0    Subjective  Patient stated that she was relatively pain free over the last 24 hours. She again took 1/2 meclizine prior to her treatment session. Pain:  [] Yes   [x] No      Location:   Pain Ratin/10  Worst: 0/10  Best: 0/10  Descriptors: Other:     Objective  Modalities:    Precautions: Meniere disease - unable to completely flat on a treatment table (elevated head of bed to~30* and 2 pillows) and unable to side lye right  Exercises:  Exercise Reps/ Time Weight/ Level Comments   Supine Hook lying Posterior Pelvic Tilt  10 reps with 2 sec hold      Supine (L)  Hook lying Posterior Pelvic Tilt with SLR 10 reps x 2 sets      Supine Hook Lying Hip Flexion () 10 reps x 2 sets     Supine Hook lying (L) Hip Abd 10 reps x 2 sets Red T-Band    Supine  (L) LE Hip Abd (with slight hip flexion) 10 reps           Seated Hip IR/ER 10 reps x 2 sets each direction                 Standing Squat 10 reps     Standing 3-Way hip (B) LE 10 reps             Passive Stretching  Seated (L) Hamstring Stretch 30 second hold x 3 reps    Pt. Education:  [] Yes  [] No  [] Reviewed Prior HEP/Ed  Method of Education: [x] Verbal  [] Demo  [] Written  HEP:   Comprehension of Education:  [] Verbalizes understanding.   [] Demonstrates understanding. [] Needs review. [] Demonstrates/verbalizes HEP/Ed previously given. Assessment  Continued with current exercise program. Demonstrated good technique with little verbal cueing throughout the treatment session. The patient would continue to benefit from skilled physical therapy that included therapeutic exercise for core/LE strengthening. Goals  STG: (to be met in 9 treatments)  1. Patient will report an average pain level of 0-3/10 within the low back/(L) SI region while performing her ADLs. LTG: (to be met in 18  Treatments)  1. Patient will report an average pain level of 0-2/10 within the low back/(L) SI region while performing her ADLs. 2. Patient will demonstrate improved strength within all involved planes to assist with (I) function. 3. Modified Oswestry Low Back Disability Index improved by 10% (MDC)  4. Patient will demonstrate independence with her home exercise program.                Patient goals: Strengthen muscles     Plan: [x] Continue per plan of care.    [] Other:       Treatment Charges: Mins Units   []  Modalities     [x]  Ther Exercise 40 3   []  Manual Therapy     []  Ther Activities     []  Aquatics     []  Neuromuscular     [] Vasocompression     [] Gait Training     [] Dry needling        [] 1 or 2 muscles        [] 3 or more muscles     []  Other     Total Treatment time 40 3     Time In: 0830           Time Out: 9332    Electronically signed by:  Denise Fox PT RAFAEL

## 2022-01-14 ENCOUNTER — HOSPITAL ENCOUNTER (OUTPATIENT)
Dept: PHYSICAL THERAPY | Age: 72
Setting detail: THERAPIES SERIES
Discharge: HOME | End: 2022-01-14
Payer: MEDICARE

## 2022-01-14 PROCEDURE — 97110 THERAPEUTIC EXERCISES: CPT

## 2022-01-14 NOTE — PROGRESS NOTES
[facility] Outpatient Physical Therapy  [address]         Phone: [phone]       Fax: [phone]    Physical Therapy Progress Note Update/Discharge Summary     Date:  2022  Patient: Jc Shepherd  : 1950  MRN: [id number]  Physician: VIBHA Stanley CNP     Medical Diagnosis: Low back strain, initial encounter (V95.639M)    Clinical Diagnosis: Low back/SI pain with motor control impairments  Onset date: 2021    Next Dr's appt.: 2022   PT Visit Information  PT Insurance Information: [employer] and [employer]   Total # of Visits Approved: 18   Total # of Visits to Date: 5  No Show: 0  Canceled Appointment: 0     Subjective  Patient stated that she continues to remain pain-free and has been since her last treatment session. She stated that she has been performing her home exercise program regularly without difficulty.  Based on this, she wished to make today her last treatment session and continue with the home exercise program.      Pain  Pain:  [] Yes   [x] No      Location:   Pain Rating: (0-10 scale) 0/10   Worst: 0/10  Best: 0/10    Symptoms:  [x] Improved [] Worsening       [] Same  Descriptors:   Aggravating factors:  Relieving factors:    Sleep: Not Disturbed  Other:     Function  Hand Dominance  [x] Right  [] Left  Working:  [] Normal Duty  [] Light Duty  [] Off D/T Condition  [x] Retired    [] Not Employed    []  Disability  [] Other:           Return to work:   Job/ADL Description:    ADL/IADL Previous level of function Current level of function Who currently assists the patient with task/Comments   Bathing  [x] Independent  [] Assist [x] Independent  [] Assist    Dress/grooming [x] Independent  [] Assist [x] Independent  [] Assist    Transfer/mobility [x] Independent  [] Assist [x] Independent  [] Assist    Feeding [x] Independent  [] Assist [x] Independent  [] Assist    Toileting [x] Independent  [] Assist [x] Independent  [] Assist    Driving [x] Independent  [] Assist [x] Independent  [] Assist    Housekeeping [x] Independent  [] Assist [x] Independent  [] Assist    Grocery shop/meal prep [x] Independent  [] Assist [x] Independent  [] Assist      Gait Prior level of function Current level of function    [x] Independent  [] Assist [x] Independent  [] Assist   Device: [x] Independent [x] Independent    [] Straight Cane [] Quad cane [] Straight Cane [] Quad cane    [] Standard walker [] Rolling walker   [] 4 wheeled walker [] Standard walker [] Rolling walker   [] 4 wheeled walker    [] Wheelchair [] Wheelchair       Objective  Observation/Palpation   Normal Deficit Comments   Observation [x] []    Posture  [] [x] Forward head, rounded shoulders, anterior pelvic tilt with increased lordosis, (B) genu recurvatum were still present    Palpation [] [x] Tenderness to the touch was still noted @ the (L) SI joint/\"sensitive\"   Edema [] []    Scar [] []    Other [] []      Range of Motion  Spine - Range of Motion  Cervical  WNL in all a planes  Thoracic  WNL in all planes   Lumbar  WNL in all planes     SI joint intact with movement    Right Lower Extremity - Passive ROM  Hip flexion  WNL  Hip extension  WNL  Hip abduction  WNL  Hip adduction  WNL  Hip ER  WNL  Hip IR  WNL  Knee flexion  WNL  Knee extension  WNL  Dorsiflexion  WNL  Plantarflexion  WNL    Left Lower Extremity - Passive ROM  Hip flexion  WNL  Hip extension  WNL  Hip abduction  WNL  Hip adduction  WNL  Hip ER  WNL   Hip IR  WNL   Knee flexion  WNL  Knee extension  WNL  Dorsiflexion  WNL  Plantarflexion  WNL    Strength  Spine Normal Deficit Comments   Anterior chain   [] [x] Lacking 90% with anterior \"bridge\"   Posterior chain   [] [] N/A - unable to lay prone   Lateral chain   [] [x]  (L) hip abduction - 2+/5 to 3-/5 MMT, (R) hip abduction - 5/5 MMT     Right Lower Extremity - Strength  Hip flexion  5/5 MMT  Hip extension  5/5 MMT  Hip abduction  5/5 MMT  Hip adduction  5/5 MMT  Hip ER  5/5 MMT  Hip IR  5/5 MMT  Knee flexion  5/5 MMT  Knee extension  5/5 MMT  Dorsiflexion  5/5 MMT  Plantarflexion  5/5 MMT    Left Lower Extremity - Strength  Hip flexion  3/5 MMT  Hip extension  4/5 MMT  Hip abduction  2+/5 to 3-/5 MMT   Hip adduction  4/5 MMT  Hip ER  3-/5 MMT  Hip IR  3/5 MMT  Knee flexion  5/5 MMT  Knee extension  5/5 MMT  Dorsiflexion  5/5 MMT  Plantarflexion  5/5 MMT    Additional Measures    Normal Deficit Comments   Sensation   [x] [] L2-S1 with light touch (R) = (L)    Reflexes   [] []    Gross motor   [] []    Flexibility    [] [x] No IT band tightness, (L) hamstring tightness, gastroc tightness or quad tightness    Special Tests   [] []     strength   [] []    Other   [] []      Gait Assessment  Assistive device: None   Comments: No gait deviations were grossly noted. Balance  N/A    Assessment  Patient improved as evidence by her self reporting a reduction in her pain symptoms. Improved range of motion was noted throughout the lumbar region with pain/difficulty. Tightness was no longer apparent within the involved areas. Strength was unchanged. However, given the period of that has elapsed a change would not be seem. Goals  STG: (to be met in 9 treatments)  1. Patient will report an average pain level of 0-3/10 within the low back/(L) SI region while performing her ADLs. (Met)    LTG: (to be met in 18  Treatments)  1. Patient will report an average pain level of 0-2/10 within the low back/(L) SI region while performing her ADLs. (Met)  2. Patient will demonstrate improved strength within all involved planes to assist with (I) function. (Not Met)  3. Modified Oswestry Low Back Disability Index improved by 10% (MDC) (Met)  4.  Patient will demonstrate independence with her home exercise program. (Met)               Patient goals: Strengthen muscles    Patient demonstrated improvement from condition with _1_ of_1__ short term goals   Patient demonstrated improvement from condition with _3_ of_4__ long term goals     Comments/Function: Clinical significant improvement was shown per the Modified Oswestry Low Back Disability Index (22% to 8%)    Pt. Education:  [] Plans/Goals, Risks/Benefits discussed    [x] Home exercise program:   Method of Education: [x] Verbal  [x] Demo  [x] Written  Comprehension of Education:  [x] Verbalizes understanding. [x] Demonstrates understanding. [] Needs Review. [] Demonstrates/verbalizes understanding of HEP/Ed previously given. Recommendations: Discontinue physical therapy at this time per the patient's request. Going to continue with written home exercise program provided. Demonstrated a good understanding of it. [x] Patient is Discharged At This Time Unless Further Orders Are Received. New Script Needed To Continue Treatment: [x] No   [] Y es  (visits left on current prescription:     13          ) Please sign orders at the bottom of this page and return by faxing. Thank you. Current Treatment:  [x] Therapeutic Exercise    [] Modalities                [] Work Conditioning  [] Therapeutic Activity    [] Ultrasound  [] Electrical Stimulation  [] Gait Training     [] Massage       [] Lumbar/Cervical Traction  [] Neuromuscular Re-education [] Cold/hotpack [] Iontophoresis: 4 mg/mL  [x] Instruction in Home Exercise Program                     Dexamethasone Sodium  [] Manual Therapy             Phosphate 40-80 mAmin  [] Aquatic Therapy                                 [] Vaso Pneumatic compression  [] Other:  More objective information is available upon request.    Medicare/Regulatory Requirements:  I have reviewed this plan of care and certify a need for medically necessary rehabilitation services.   [] Physician Signature                                                   Date:       Thank you for your referral                 Electronically signed by: Gaviota Armstrong, PT DPT     [facility]) @ [facility]  [address] [place] [address] [address]  [place], [address] [facility]  Phone [phone]  Fax [phone]    Treatment Charges: Mins Units   [] Evaluation       []  Low       []  Moderate       []  High     []  Modalities     [x]  Ther Exercise 10 1   []  Manual Therapy     []  Ther Activities     []  Aquatics     []  Neuromuscular     []  Gait Training     []  Dry Needling           1-2 muscles     []  Dry Needling           3 or more          muscles     [] Vasocompression     [x]  Other: Re-Evaluation 15 0       TOTAL TREATMENT TIME: 25    Time in: 0830  Time Out: 8676

## 2022-01-17 ENCOUNTER — APPOINTMENT (OUTPATIENT)
Dept: PHYSICAL THERAPY | Age: 72
End: 2022-01-17
Payer: MEDICARE

## 2022-01-19 ENCOUNTER — APPOINTMENT (OUTPATIENT)
Dept: PHYSICAL THERAPY | Age: 72
End: 2022-01-19
Payer: MEDICARE

## 2022-01-21 ENCOUNTER — APPOINTMENT (OUTPATIENT)
Dept: PHYSICAL THERAPY | Age: 72
End: 2022-01-21
Payer: MEDICARE

## 2022-01-26 DIAGNOSIS — K51.919 ULCERATIVE COLITIS WITH COMPLICATION, UNSPECIFIED LOCATION (HCC): ICD-10-CM

## 2022-01-26 RX ORDER — MESALAMINE 0.38 G/1
CAPSULE, EXTENDED RELEASE ORAL
Qty: 360 CAPSULE | Refills: 0 | Status: SHIPPED | OUTPATIENT
Start: 2022-01-26 | End: 2022-02-02

## 2022-01-26 NOTE — TELEPHONE ENCOUNTER
Patient called and stated that she was prescribed APRISO 0.375 and she went to get it refilled and the pharmacy could not find the script that she was on the medication. Writer looked through the patient's chart and seen the last was started on 10/22/19 and expires 10/21/20. The script was ordered by Jamil Miller and sent to [facility] at [facility]. Writer will route this message to Jamil Miller and see if the medication can be refilled. Writer will reach out to the patient when [employer].

## 2022-01-27 NOTE — TELEPHONE ENCOUNTER
Patient stopped into the office stating that the pharmacy did not have a script for her. Advised that it was sent and receipt confirmed 1/26/22 at 8:51 pm.  Called pharmacy and talked to Thu Mejía and he advised that they did have and it is in process. To wait about 30 minutes before trying to . The patient was informed and writer was thanked for the help. Appointment was made for 2/28/22 @ 2:30 pm.  Last seen 8/27/19 for UC.

## 2022-01-28 NOTE — TELEPHONE ENCOUNTER
Patient stopped into the office and states that the apriso is not covered. Asking for something different. She is going out of town tomorrow and in need. Please follow up.

## 2022-02-02 RX ORDER — MESALAMINE 800 MG/1
800 TABLET, DELAYED RELEASE ORAL 3 TIMES DAILY
Qty: 120 TABLET | Refills: 3 | Status: SHIPPED | OUTPATIENT
Start: 2022-02-02 | End: 2022-02-16 | Stop reason: ALTCHOICE

## 2022-02-16 RX ORDER — MESALAMINE 1.2 G/1
1200 TABLET, DELAYED RELEASE ORAL
Qty: 30 TABLET | Refills: 3 | Status: SHIPPED | OUTPATIENT
Start: 2022-02-16 | End: 2022-02-17 | Stop reason: SDUPTHER

## 2022-02-17 NOTE — TELEPHONE ENCOUNTER
Patient stopped into the office on 2/16/22 and stated that the medication that was prescribed to her was too much money and she was wanting another mediation sent in. [facility] sent in Mesalamine (Lialda) 1.2 g. Into [facility] on 2/16/22. Writer received a PA from [employer]. Writer completed the PA and now just waiting for a response. Writer contacted the patient to up date her on the process. Patient asked if she will be ok if she has to miss any dosages. Writer consulted with [facility] and she stated that the patient will be fine. Patient verbalized understanding and thanked the writer.

## 2022-02-26 ENCOUNTER — HOSPITAL ENCOUNTER (EMERGENCY)
Age: 72
Discharge: HOME | End: 2022-02-26
Attending: EMERGENCY MEDICINE
Payer: MEDICARE

## 2022-02-26 ENCOUNTER — APPOINTMENT (OUTPATIENT)
Dept: GENERAL RADIOLOGY | Age: 72
End: 2022-02-26
Payer: MEDICARE

## 2022-02-26 VITALS
HEIGHT: 61 IN | OXYGEN SATURATION: 94 % | RESPIRATION RATE: 20 BRPM | WEIGHT: 160 LBS | SYSTOLIC BLOOD PRESSURE: 160 MMHG | TEMPERATURE: 98.6 F | HEART RATE: 85 BPM | BODY MASS INDEX: 30.21 KG/M2 | DIASTOLIC BLOOD PRESSURE: 66 MMHG

## 2022-02-26 DIAGNOSIS — R06.02 SHORTNESS OF BREATH: ICD-10-CM

## 2022-02-26 DIAGNOSIS — J40 BRONCHITIS: Primary | ICD-10-CM

## 2022-02-26 DIAGNOSIS — R05.9 COUGH: ICD-10-CM

## 2022-02-26 LAB
ABSOLUTE EOS #: 0.07 K/UL (ref 0–0.44)
ABSOLUTE IMMATURE GRANULOCYTE: 0.04 K/UL (ref 0–0.3)
ABSOLUTE LYMPH #: 2.38 K/UL (ref 1.1–3.7)
ABSOLUTE MONO #: 0.79 K/UL (ref 0.1–1.2)
ANION GAP SERPL CALCULATED.3IONS-SCNC: 13 MMOL/L (ref 9–17)
BASOPHILS # BLD: 0 % (ref 0–2)
BASOPHILS ABSOLUTE: 0.03 K/UL (ref 0–0.2)
BUN BLDV-MCNC: 11 MG/DL (ref 8–23)
CALCIUM SERPL-MCNC: 9.3 MG/DL (ref 8.6–10.4)
CHLORIDE BLD-SCNC: 102 MMOL/L (ref 98–107)
CO2: 23 MMOL/L (ref 20–31)
CREAT SERPL-MCNC: 0.46 MG/DL (ref 0.5–0.9)
D-DIMER QUANTITATIVE: 0.33 MG/L FEU
EOSINOPHILS RELATIVE PERCENT: 1 % (ref 1–4)
GFR AFRICAN AMERICAN: >60 ML/MIN
GFR NON-AFRICAN AMERICAN: >60 ML/MIN
GFR SERPL CREATININE-BSD FRML MDRD: ABNORMAL ML/MIN/{1.73_M2}
GLUCOSE BLD-MCNC: 128 MG/DL (ref 70–99)
HCT VFR BLD CALC: 42.4 % (ref 36.3–47.1)
HEMOGLOBIN: 14.7 G/DL (ref 11.9–15.1)
IMMATURE GRANULOCYTES: 0 %
LYMPHOCYTES # BLD: 22 % (ref 24–43)
MAGNESIUM: 2.1 MG/DL (ref 1.6–2.6)
MCH RBC QN AUTO: 30.1 PG (ref 25.2–33.5)
MCHC RBC AUTO-ENTMCNC: 34.7 G/DL (ref 28.4–34.8)
MCV RBC AUTO: 86.9 FL (ref 82.6–102.9)
MONOCYTES # BLD: 7 % (ref 3–12)
NRBC AUTOMATED: 0 PER 100 WBC
PDW BLD-RTO: 12.2 % (ref 11.8–14.4)
PLATELET # BLD: 251 K/UL (ref 138–453)
PMV BLD AUTO: 11.2 FL (ref 8.1–13.5)
POTASSIUM SERPL-SCNC: 3.5 MMOL/L (ref 3.7–5.3)
PRO-BNP: 780 PG/ML
RBC # BLD: 4.88 M/UL (ref 3.95–5.11)
SEG NEUTROPHILS: 70 % (ref 36–65)
SEGMENTED NEUTROPHILS ABSOLUTE COUNT: 7.52 K/UL (ref 1.5–8.1)
SODIUM BLD-SCNC: 138 MMOL/L (ref 135–144)
TROPONIN, HIGH SENSITIVITY: 6 NG/L (ref 0–14)
TROPONIN, HIGH SENSITIVITY: 7 NG/L (ref 0–14)
WBC # BLD: 10.8 K/UL (ref 3.5–11.3)

## 2022-02-26 PROCEDURE — 83735 ASSAY OF MAGNESIUM: CPT

## 2022-02-26 PROCEDURE — 85379 FIBRIN DEGRADATION QUANT: CPT

## 2022-02-26 PROCEDURE — 6370000000 HC RX 637 (ALT 250 FOR IP): Performed by: STUDENT IN AN ORGANIZED HEALTH CARE EDUCATION/TRAINING PROGRAM

## 2022-02-26 PROCEDURE — 83880 ASSAY OF NATRIURETIC PEPTIDE: CPT

## 2022-02-26 PROCEDURE — 71046 X-RAY EXAM CHEST 2 VIEWS: CPT

## 2022-02-26 PROCEDURE — 85025 COMPLETE CBC W/AUTO DIFF WBC: CPT

## 2022-02-26 PROCEDURE — 93005 ELECTROCARDIOGRAM TRACING: CPT | Performed by: STUDENT IN AN ORGANIZED HEALTH CARE EDUCATION/TRAINING PROGRAM

## 2022-02-26 PROCEDURE — 80048 BASIC METABOLIC PNL TOTAL CA: CPT

## 2022-02-26 PROCEDURE — 99285 EMERGENCY DEPT VISIT HI MDM: CPT

## 2022-02-26 PROCEDURE — 84484 ASSAY OF TROPONIN QUANT: CPT

## 2022-02-26 RX ORDER — DOXYCYCLINE HYCLATE 100 MG
100 TABLET ORAL 2 TIMES DAILY
Qty: 14 TABLET | Refills: 0 | Status: SHIPPED | OUTPATIENT
Start: 2022-02-26 | End: 2022-03-05

## 2022-02-26 RX ORDER — DOXYCYCLINE HYCLATE 100 MG
100 TABLET ORAL ONCE
Status: COMPLETED | OUTPATIENT
Start: 2022-02-26 | End: 2022-02-26

## 2022-02-26 RX ORDER — ASPIRIN 81 MG/1
324 TABLET, CHEWABLE ORAL ONCE
Status: COMPLETED | OUTPATIENT
Start: 2022-02-26 | End: 2022-02-26

## 2022-02-26 RX ORDER — GUAIFENESIN 600 MG/1
1200 TABLET, EXTENDED RELEASE ORAL 2 TIMES DAILY
Qty: 28 TABLET | Refills: 0 | Status: SHIPPED | OUTPATIENT
Start: 2022-02-26 | End: 2022-03-05

## 2022-02-26 RX ADMIN — ASPIRIN 324 MG: 81 TABLET, CHEWABLE ORAL at 09:42

## 2022-02-26 RX ADMIN — DOXYCYCLINE HYCLATE 100 MG: 100 TABLET, COATED ORAL at 11:26

## 2022-02-26 ASSESSMENT — ENCOUNTER SYMPTOMS
SORE THROAT: 0
ABDOMINAL PAIN: 0
CONSTIPATION: 0
WHEEZING: 0
BLOOD IN STOOL: 0
SHORTNESS OF BREATH: 1
RHINORRHEA: 1
DIARRHEA: 0
VOMITING: 0
NAUSEA: 0
COUGH: 1

## 2022-02-26 NOTE — ED NOTES
Pt walk-in from home with spouse c/o cough for 1 week with nasal congestion. Pt states that cough is occasionally productive with clear sputum. Pt originally denied SOB, but stated that she hasn't been able to sleep because \"when she lays down she can't catch her breath because she can't stop coughing\". Denies n/v, f/c, headache, CP, or leg swelling. Pt ambulated to ED cart. Placed on monitor. EKG and labs done.               Shey Acevedo RN  02/26/22 1754

## 2022-02-26 NOTE — ED PROVIDER NOTES
[facility]  ED  Emergency Department Encounter  EmergencyMedicine Resident     Pt Nick Walden  MRN: [id number]  Birthdate 1950  Date of evaluation: 2/26/22  PCP:  Lucia Davalos MD    [address] [place] [address]       Chief Complaint   Patient presents with    Cough       HISTORY OF PRESENT ILLNESS  (Location/Symptom, Timing/Onset, Context/Setting, Quality, Duration, Modifying Factors, Severity.)      Maria Antonia Iniguez is a 71 y.o. female who presents with cough. Patient notes that for the past 6 days she is been having a nonproductive cough which was preceded by 1 day of rhinorrhea and congestion. She notes that with this cough she is having orthopnea at night when she lays down along with increased coughing. She notes in the last 2 days has been taking Tessalon Perles and her albuterol inhaler which have not relieved her symptoms. She notes though that she is not having any kind of fevers, chills, chest pain, heart palpitations, diaphoresis, nausea/vomiting, hemoptysis, melena, hematocheziaor leg swelling. She notes that she did just have a recent trip to [place] by and lines and came back, 2/13/2022. She denies any history of VTE, surgery, trauma, estrogen use, or cancer. Patient notes that in January she tested positive for Covid COVID-19, but tested negative on 2/12/2022. PAST MEDICAL / SURGICAL / SOCIAL / FAMILY HISTORY      has a past medical history of Anginal pain ([address] Bronchitis, Colitis, ulcerative ([address] Diabetes mellitus ([address] GERD (gastroesophageal reflux disease), Hypercholesterolemia, Hyperlipidemia, Hypertension, essential, benign, Meniere disease, Obesity (BMI 30.0-34.9), Ulcerative pancolitis without complication ([address] and Vitamin D deficiency. has a past surgical history that includes Foot surgery (2011); Rotator cuff repair (2011); Colonoscopy (8/12/2011); Colonoscopy (12/15/2015); and Colonoscopy (N/A, 7/2/2019).       Social History     Socioeconomic Diabetes Father     Diabetes Sister     Thyroid Disease Sister     Diabetes Brother     Diabetes Brother     Thyroid Disease Brother     Ulcerative Colitis Grandchild        Allergies:  Patient has no known allergies. Home Medications:  Prior to Admission medications    Medication Sig Start Date End Date Taking? Authorizing Provider   guaiFENesin (MUCINEX) 600 MG extended release tablet Take 2 tablets by mouth 2 times daily for 7 days 2/26/22 3/5/22 Yes Brooke Osorio DO   doxycycline hyclate (VIBRA-TABS) 100 MG tablet Take 1 tablet by mouth 2 times daily for 7 days 2/26/22 3/5/22 Yes Brooke Osorio DO   mesalamine (LIALDA) 1.2 g EC tablet Take 1 tablet by mouth daily (with breakfast) 2/17/22   Amisha Daniel MD   atorvastatin (LIPITOR) 10 MG tablet TAKE 1 TABLET BY MOUTH EVERY DAY 2/17/22   Amisha Daniel MD   fluticasone (FLOVENT HFA) 110 MCG/ACT inhaler Inhale 1 puff into the lungs 2 times daily 2/17/22 2/17/23  Amisha Daniel MD   tiZANidine (ZANAFLEX) 4 MG tablet TAKE 1 TABLET BY MOUTH 3 TIMES DAILY AS NEEDED (MUSCLE SPASMS).   Patient not taking: Reported on 2/17/2022 1/12/22   Amisha Daniel MD   albuterol sulfate  (90 Base) MCG/ACT inhaler INHALE 2 PUFFS INTO THE LUNGS EVERY 6 HOURS AS NEEDED FOR WHEEZING OR SHORTNESS OF BREATH 1/10/22   Amisha Daniel MD   metFORMIN (GLUCOPHAGE-XR) 500 MG extended release tablet TAKE 1 TABLET BY MOUTH EVERY DAY WITH BREAKFAST 12/22/21   VIBHA Wilkins - CNP   blood glucose test strips (TRUE METRIX BLOOD GLUCOSE TEST) strip TEST 1 TIME DAILY FOR SYMPTOMS OF IRREGULAR BLOOD GLUCOSE. 12/5/21   Amisha Daniel MD   fluticasone [facility]) 50 MCG/ACT nasal spray 2 sprays by Nasal route daily 11/23/21   Topher Rm MD   BREO ELLIPTA 100-25 MCG/INH AEPB inhaler  11/11/21   Historical Provider, MD   omeprazole (PRILOSEC) 20 MG delayed release capsule TAKE 1 CAPSULE BY MOUTH EVERY DAY 10/13/21   Amisha Daniel MD   cephALEXin (Alexia Shabbir) 500 MG capsule Take 1 capsule by mouth 2 times daily  Patient not taking: Reported on 2021   Nikolay Rendon MD   Efinaconazole 10 % SOLN Apply 1 Applicatorful topically daily  Patient not taking: Reported on 2021   Nithya Bis, DPM   metoprolol succinate (TOPROL XL) 50 MG extended release tablet TAKE 1 TABLET BY MOUTH EVERY DAY 3/12/20   Historical Provider, MD   TRUEPLUS LANCETS 30G MISC Test 1 times a day for symptoms of irregular blood glucose. 19   Nikolay Rendon MD   albuterol sulfate HFA (PROAIR HFA) 108 (90 Base) MCG/ACT inhaler Inhale 2 puffs into the lungs every 6 hours as needed for Wheezing 18   Orin Cadena MD   meclizine (ANTIVERT) 25 MG tablet TAKE 1 TABLET BY MOUTH THREE TIMES A DAY AS NEEDED FOR NAUSEA or dizziness  9/10/18   Nikolay Rendon MD   promethazine (PHENERGAN) 25 MG tablet TAKE 1/2 TABLET BY MOUTH EVERY SIX HOURS AS NEEDED FOR NAUSEA  Patient not taking: Reported on 2021   Nikolay Rendon MD   ondansetron (ZOFRAN-ODT) 4 MG disintegrating tablet DISSOLVE 1 TABLET IN MOUTH EVERY EIGHT HOURS AS NEEDED FOR NAUSEA AND VOMITING  Patient not taking: Reported on 2021   Napolean Bamberger, APRN - CNP   Blood Glucose Monitoring Suppl SU Inject 1 each into the skin daily and prn for diabetes  Patient taking differently: Inject 1 each into the skin daily for diabetes 18   Napolean Bamberger, APRN - CNP   amLODIPine (NORVASC) 2.5 MG tablet Take 1 tablet by mouth daily 17   Napolean Bamberger, APRN - CNP   Handicap Placard MISC Duration 5 years  on 2022   Nithya Bis, DPM   Loratadine (CLARITIN PO) Take by mouth daily     Historical Provider, MD       REVIEW OF SYSTEMS    (2-9 systems for level 4, 10 or more for level 5)      Review of Systems   Constitutional: Negative for chills, diaphoresis, fatigue and fever. HENT: Positive for congestion (resolved) and rhinorrhea (resolved). Negative for sore throat. Respiratory: Positive for cough and shortness of breath (orthopnea). Negative for wheezing. Cardiovascular: Negative for chest pain, palpitations and leg swelling. Gastrointestinal: Negative for abdominal pain, blood in stool, constipation, diarrhea, nausea and vomiting. Genitourinary: Negative for dysuria, hematuria, vaginal bleeding and vaginal discharge. Musculoskeletal: Negative for arthralgias and myalgias. Skin: Negative for rash and wound. Neurological: Negative for weakness, light-headedness and numbness. PHYSICAL EXAM   (up to 7 for level 4, 8 or more for level 5)      INITIAL VITALS:   BP (!) 160/66   Pulse 85   Temp 98.6 °F (37 °C) (Oral)   Resp 20   Ht 5' 1\" (1.549 m)   Wt 160 lb (72.6 kg)   SpO2 94%   BMI 30.23 kg/m²     Physical Exam  Vitals and nursing note reviewed. Constitutional:       General: She is not in acute distress. Appearance: Normal appearance. She is well-developed and normal weight. She is not toxic-appearing or diaphoretic. HENT:      Head: Normocephalic and atraumatic. Right Ear: External ear normal.      Left Ear: External ear normal.      Nose: Nose normal.      Mouth/Throat:      Mouth: Mucous membranes are moist.      Pharynx: Oropharynx is clear. Eyes:      General: No scleral icterus. Extraocular Movements: Extraocular movements intact. Conjunctiva/sclera: Conjunctivae normal.      Pupils: Pupils are equal, round, and reactive to light. Neck:      Vascular: No JVD. Trachea: No tracheal deviation. Cardiovascular:      Rate and Rhythm: Normal rate and regular rhythm. Pulses: Normal pulses. Heart sounds: Normal heart sounds, S1 normal and S2 normal. No murmur heard. No friction rub. No gallop. Pulmonary:      Effort: Pulmonary effort is normal. No respiratory distress. Breath sounds: Normal breath sounds. Abdominal:      General: Abdomen is flat. There is no distension. Palpations: Abdomen is soft. Tenderness: There is no abdominal tenderness. There is no guarding or rebound. Musculoskeletal:         General: No swelling or tenderness. Normal range of motion. Cervical back: Normal range of motion and neck supple. No rigidity. Comments: Full A/P ROM of all extremities without crepitus or pain. No signs of erythema, warmth, or induration on bilateral lower extremities concerning for DVT. Skin:     General: Skin is warm and dry. Capillary Refill: Capillary refill takes less than 2 seconds. Neurological:      General: No focal deficit present. Mental Status: She is alert and oriented to person, place, and time. Motor: No abnormal muscle tone.          DIFFERENTIAL  DIAGNOSIS     PLAN (LABS / IMAGING / EKG):  Orders Placed This Encounter   Procedures    XR CHEST (2 VW)    CBC Auto Differential    Troponin    D-Dimer, Quantitative    Basic Metabolic Panel    Magnesium    Brain Natriuretic Peptide    Initiate Oxygen Therapy Protocol    EKG 12 Lead    Saline lock IV       MEDICATIONS ORDERED:  Orders Placed This Encounter   Medications    aspirin chewable tablet 324 mg    guaiFENesin (MUCINEX) 600 MG extended release tablet     Sig: Take 2 tablets by mouth 2 times daily for 7 days     Dispense:  28 tablet     Refill:  0    doxycycline hyclate (VIBRA-TABS) 100 MG tablet     Sig: Take 1 tablet by mouth 2 times daily for 7 days     Dispense:  14 tablet     Refill:  0    doxycycline hyclate (VIBRA-TABS) tablet 100 mg     Order Specific Question:   Antimicrobial Indications     Answer:   Pneumonia (CAP)       DDX: Bronchitis, pneumonia, ACS/MI, arrhythmia, anemia, electrolyte abnormality, new onset acute CHF, PE    DIAGNOSTIC RESULTS / EMERGENCY DEPARTMENT COURSE / MDM   LAB RESULTS:  Results for orders placed or performed during the hospital encounter of 02/26/22   CBC Auto Differential   Result Value Ref Range    WBC 10.8 3.5 - 11.3 k/uL RBC 4.88 3.95 - 5.11 m/uL    Hemoglobin 14.7 11.9 - 15.1 g/dL    Hematocrit 42.4 36.3 - 47.1 %    MCV 86.9 82.6 - 102.9 fL    MCH 30.1 25.2 - 33.5 pg    MCHC 34.7 28.4 - 34.8 g/dL    RDW 12.2 11.8 - 14.4 %    Platelets 167 621 - 734 k/uL    MPV 11.2 8.1 - 13.5 fL    NRBC Automated 0.0 0.0 per 100 WBC    Seg Neutrophils 70 (H) 36 - 65 %    Lymphocytes 22 (L) 24 - 43 %    Monocytes 7 3 - 12 %    Eosinophils % 1 1 - 4 %    Basophils 0 0 - 2 %    Immature Granulocytes 0 0 %    Segs Absolute 7.52 1.50 - 8.10 k/uL    Absolute Lymph # 2.38 1.10 - 3.70 k/uL    Absolute Mono # 0.79 0.10 - 1.20 k/uL    Absolute Eos # 0.07 0.00 - 0.44 k/uL    Basophils Absolute 0.03 0.00 - 0.20 k/uL    Absolute Immature Granulocyte 0.04 0.00 - 0.30 k/uL   Troponin   Result Value Ref Range    Troponin, High Sensitivity 6 0 - 14 ng/L   Troponin   Result Value Ref Range    Troponin, High Sensitivity 7 0 - 14 ng/L   D-Dimer, Quantitative   Result Value Ref Range    D-Dimer, Quant 0.33 mg/L FEU   Basic Metabolic Panel   Result Value Ref Range    Glucose 128 (H) 70 - 99 mg/dL    BUN 11 8 - 23 mg/dL    CREATININE 0.46 (L) 0.50 - 0.90 mg/dL    Calcium 9.3 8.6 - 10.4 mg/dL    Sodium 138 135 - 144 mmol/L    Potassium 3.5 (L) 3.7 - 5.3 mmol/L    Chloride 102 98 - 107 mmol/L    CO2 23 20 - 31 mmol/L    Anion Gap 13 9 - 17 mmol/L    GFR Non-African American >60 >60 mL/min    GFR African American >60 >60 mL/min    GFR Comment         Magnesium   Result Value Ref Range    Magnesium 2.1 1.6 - 2.6 mg/dL   Brain Natriuretic Peptide   Result Value Ref Range    Pro- (H) <300 pg/mL       IMPRESSION: 29-year-old female presents to the ER for cough shortness of breath and orthopnea. Patient appears to be no acute distress and nontoxic-appearing. Patient's initial vitals reveal hypertension of 173/97 was otherwise stable nonconcerning. Patient is speaking in full sentence without any respiratory distress or wheezing [facility] on examination.   Hemodynamically stable with normal S1-S2 heart sounds with no murmurs rubs or gallops. Abdomen is benign with no peritoneal signs. No signs of DVT on examination. Concern for above differential diagnosis. Will order CBC, BMP, magnesium, BNP, EKG, troponin, chest x-ray, and aspirin. Possible admission. MIPS 116    Measure Exclusions: The patient is a hospice patient: No  The patient is already on antibiotics, or has used them within the last 30 days: No   This visit resulted in an inpatient admission: No    Measure Questions:  I dispensed or prescribed antibiotics to this patient during this visit:  The patient had a specific medical reason for the dispensing/prescription of an antibiotic. That reason is: concomitant diabetes      RADIOLOGY:  XR CHEST (2 VW)    Result Date: 2/26/2022  EXAMINATION: TWO XRAY VIEWS OF THE CHEST 2/26/2022 10:03 am COMPARISON: 11/10/2021 HISTORY: ORDERING SYSTEM PROVIDED HISTORY: cough, shortness of breath TECHNOLOGIST PROVIDED HISTORY: cough, shortness of breath Reason for Exam: cough, shortness of breath FINDINGS: There is some scarring in the left lung base. The lungs are otherwise clear. There are no pulmonary nodules, masses or infiltrates. Heart and mediastinal structures are unremarkable. Bony structures appear normal.     Mild scarring in the left lung base. Examination is otherwise unremarkable. EKG  EKG Interpretation    Interpreted by emergency department physician    Rhythm: normal sinus   Rate: normal  Axis: normal  Ectopy: none  Conduction: right bundle branch block (incomplete)  ST Segments: no acute change  T Waves: no acute change-seen in the precordial leads along with the inferior leads. Q Waves: v1, v3, III and aVr    Clinical Impression: no acute changes compared to prior EKG on 3/21/2016.     Kacie Navarro, DO      All EKG's are interpreted by the Emergency Department Physician who either signs or Co-signs this chart in the absence of a cardiologist.    EMERGENCY DEPARTMENT COURSE:  ED Course as of 02/26/22 1133   Sat Feb 26, 2022   1007 CBC is non-concerning. [CS]   1017 D-Dimer, Quant: 0.33  Negative. There is no concern for PE this point due to negative D-dimer and is already low risk for possible PE given subacute history of travel. [CS]   7289 Troponin, High Sensitivity: 6  Will repeat [CS]   2035 BMP is normal.Back is normal. [CS]   1118 Troponin, High Sensitivity: 7  Stable. Plan for discharge and will receive a dose of doxycycline here. Patient to follow-up outpatient. [CS]   5907 Patient updated about negative test results concern for bronchitis with need of antibiotic given history of diabetes and recent Covid pneumonia. Patient was agreeable discharge plan. Patient was educated return precautions. Patient verbalized understanding to return to ER for any worsening symptoms as it could be getting of a life-threatening process. [CS]      ED Course User Index  [CS] Pascual Mckee DO         PROCEDURES:  None    CONSULTS:  None    CRITICAL CARE:  Please see attending note    FINAL IMPRESSION      1. Bronchitis    2. Cough    3.  Shortness of breath          DISPOSITION / PLAN     DISPOSITION  Discharge      PATIENT REFERRED TO:  Misha Presley [id number] [facility]  [address]  [address] [facility]  [phone]    Schedule an appointment as soon as possible for a visit in 3 days  for reevaluation regarding this visit    [facility]  [address] [address]  [phone]  Go to   If symptoms worsen      DISCHARGE MEDICATIONS:  New Prescriptions    DOXYCYCLINE HYCLATE (VIBRA-TABS) 100 MG TABLET    Take 1 tablet by mouth 2 times daily for 7 days    GUAIFENESIN (MUCINEX) 600 MG EXTENDED RELEASE TABLET    Take 2 tablets by mouth 2 times daily for 7 days       Pascual Mckee DO  Emergency Medicine Resident    (Please note that portions of thisnote were completed with a voice recognition program.  Efforts were made to edit the dictations but occasionally words are mis-transcribed.)        Alina Posey DO  Resident  02/26/22 [id number]

## 2022-02-28 ENCOUNTER — OFFICE VISIT (OUTPATIENT)
Dept: GASTROENTEROLOGY | Age: 72
End: 2022-02-28
Payer: MEDICARE

## 2022-02-28 VITALS
HEIGHT: 61 IN | WEIGHT: 165 LBS | SYSTOLIC BLOOD PRESSURE: 158 MMHG | OXYGEN SATURATION: 96 % | BODY MASS INDEX: 31.15 KG/M2 | DIASTOLIC BLOOD PRESSURE: 80 MMHG | HEART RATE: 41 BPM | TEMPERATURE: 97.9 F

## 2022-02-28 DIAGNOSIS — K51.90 ULCERATIVE COLITIS IN REMISSION (HCC): Primary | ICD-10-CM

## 2022-02-28 LAB
EKG ATRIAL RATE: 84 BPM
EKG P AXIS: 9 DEGREES
EKG P-R INTERVAL: 136 MS
EKG Q-T INTERVAL: 380 MS
EKG QRS DURATION: 108 MS
EKG QTC CALCULATION (BAZETT): 449 MS
EKG R AXIS: -18 DEGREES
EKG T AXIS: -13 DEGREES
EKG VENTRICULAR RATE: 84 BPM

## 2022-02-28 PROCEDURE — 1090F PRES/ABSN URINE INCON ASSESS: CPT | Performed by: INTERNAL MEDICINE

## 2022-02-28 PROCEDURE — 4040F PNEUMOC VAC/ADMIN/RCVD: CPT | Performed by: INTERNAL MEDICINE

## 2022-02-28 PROCEDURE — G8400 PT W/DXA NO RESULTS DOC: HCPCS | Performed by: INTERNAL MEDICINE

## 2022-02-28 PROCEDURE — 93010 ELECTROCARDIOGRAM REPORT: CPT | Performed by: INTERNAL MEDICINE

## 2022-02-28 PROCEDURE — 99214 OFFICE O/P EST MOD 30 MIN: CPT | Performed by: INTERNAL MEDICINE

## 2022-02-28 PROCEDURE — G8484 FLU IMMUNIZE NO ADMIN: HCPCS | Performed by: INTERNAL MEDICINE

## 2022-02-28 PROCEDURE — 1036F TOBACCO NON-USER: CPT | Performed by: INTERNAL MEDICINE

## 2022-02-28 PROCEDURE — 1123F ACP DISCUSS/DSCN MKR DOCD: CPT | Performed by: INTERNAL MEDICINE

## 2022-02-28 PROCEDURE — G8427 DOCREV CUR MEDS BY ELIG CLIN: HCPCS | Performed by: INTERNAL MEDICINE

## 2022-02-28 PROCEDURE — 3017F COLORECTAL CA SCREEN DOC REV: CPT | Performed by: INTERNAL MEDICINE

## 2022-02-28 PROCEDURE — G8417 CALC BMI ABV UP PARAM F/U: HCPCS | Performed by: INTERNAL MEDICINE

## 2022-02-28 RX ORDER — BALSALAZIDE DISODIUM 750 MG/1
1500 CAPSULE ORAL 2 TIMES DAILY
Qty: 120 CAPSULE | Refills: 11 | Status: SHIPPED | OUTPATIENT
Start: 2022-02-28

## 2022-02-28 ASSESSMENT — ENCOUNTER SYMPTOMS
DIARRHEA: 0
BACK PAIN: 0
ABDOMINAL DISTENTION: 0
NAUSEA: 0
VOMITING: 0
COUGH: 0
ABDOMINAL PAIN: 0
SORE THROAT: 0
VOICE CHANGE: 0
CHOKING: 0
ANAL BLEEDING: 0
SHORTNESS OF BREATH: 0
BLOOD IN STOOL: 0
TROUBLE SWALLOWING: 0
CONSTIPATION: 0
RECTAL PAIN: 0

## 2022-02-28 NOTE — PROGRESS NOTES
GI OFFICE FOLLOW UP    INTERVAL HISTORY:   No referring provider defined for this encounter. Chief Complaint   Patient presents with    Follow-up     Patient is here to f/u on UC and medication refill       1. Ulcerative colitis in remission ([address]              HISTORY OF PRESENT ILLNESS: Quinten Nix is a 71 y.o. female with a past history remarkable for   Patient seen for follow-up of inflammatory bowel disease. She is known to have ulcerative colitis since 2011. Her last colonoscopy was in July 2019 and at that time the disease was in remission. Patient has been on mesalamine therapy. Recently she was taking Apriso. Her insurance denied the prescription. Also denied Lialda. On further discussion patient is doing reasonably well. She had one formed stools a day. No hematochezia. No nocturnal bowel movements. Tolerating diet well and there is no weight loss. No abdominal distention, bloating, pain. No fever or chills. Energy levels satisfactory. No symptoms suggestive of extraintestinal manifestation of IBD. Overall she is doing well. Her labs that were done in February 2022 reviewed hemoglobin was 14.7. Platelet count 923. LFTs in December 2021 and BUN/creatinine at that time within normal limits. Past Medical,Family, and Social History reviewed and does contribute to the patient presenting condition. Patient's PMH/PSH,SH,PSYCH Hx, MEDs, ALLERGIES, and ROS were all reviewed and updated in the appropriate sections.  Yes      PAST MEDICAL HISTORY:  Past Medical History:   Diagnosis Date    Anginal pain ([address]     hx of angina    Bronchitis 2014    Colitis, ulcerative ([address]     Diabetes mellitus ([address]     GERD (gastroesophageal reflux disease)     Hypercholesterolemia     Hyperlipidemia     Hypertension, essential, benign 12/4/2017    Delmi disease     Obesity (BMI 30.0-34.9) 5/28/2014    Ulcerative pancolitis without complication [facility]     Vitamin D deficiency        Past Surgical History:   Procedure Laterality Date    COLONOSCOPY  8/12/2011    colitis in the left colon    COLONOSCOPY  12/15/2015    ulcerative colitis in remission    COLONOSCOPY N/A 7/2/2019    COLONOSCOPY WITH BIOPSY performed by Nicolás Grider MD at [facility]    bilateral    ROTATOR CUFF REPAIR  2011    L shoulder       CURRENT MEDICATIONS:    Current Outpatient Medications:     balsalazide (COLAZAL) 750 MG capsule, Take 2 capsules by mouth 2 times daily, Disp: 120 capsule, Rfl: 11    guaiFENesin (MUCINEX) 600 MG extended release tablet, Take 2 tablets by mouth 2 times daily for 7 days, Disp: 28 tablet, Rfl: 0    doxycycline hyclate (VIBRA-TABS) 100 MG tablet, Take 1 tablet by mouth 2 times daily for 7 days, Disp: 14 tablet, Rfl: 0    mesalamine (LIALDA) 1.2 g EC tablet, Take 1 tablet by mouth daily (with breakfast), Disp: 30 tablet, Rfl: 3    atorvastatin (LIPITOR) 10 MG tablet, TAKE 1 TABLET BY MOUTH EVERY DAY, Disp: 90 tablet, Rfl: 0    fluticasone (FLOVENT HFA) 110 MCG/ACT inhaler, Inhale 1 puff into the lungs 2 times daily, Disp: 1 each, Rfl: 3    tiZANidine (ZANAFLEX) 4 MG tablet, TAKE 1 TABLET BY MOUTH 3 TIMES DAILY AS NEEDED (MUSCLE SPASMS). , Disp: 90 tablet, Rfl: 0    albuterol sulfate  (90 Base) MCG/ACT inhaler, INHALE 2 PUFFS INTO THE LUNGS EVERY 6 HOURS AS NEEDED FOR WHEEZING OR SHORTNESS OF BREATH, Disp: 18 g, Rfl: 1    metFORMIN (GLUCOPHAGE-XR) 500 MG extended release tablet, TAKE 1 TABLET BY MOUTH EVERY DAY WITH BREAKFAST, Disp: 90 tablet, Rfl: 0    blood glucose test strips (TRUE METRIX BLOOD GLUCOSE TEST) strip, TEST 1 TIME DAILY FOR SYMPTOMS OF IRREGULAR BLOOD GLUCOSE., Disp: 100 strip, Rfl: 3    fluticasone (FLONASE) 50 MCG/ACT nasal spray, 2 sprays by Nasal route daily, Disp: 1 each, Rfl: 5    BREO ELLIPTA 100-25 MCG/INH AEPB inhaler, , Disp: , Rfl:     omeprazole (PRILOSEC) 20 MG delayed release capsule, TAKE 1 CAPSULE BY MOUTH EVERY DAY, Disp: 90 capsule, Rfl: 1    cephALEXin (KEFLEX) 500 MG capsule, Take 1 capsule by mouth 2 times daily, Disp: 14 capsule, Rfl: 0    Efinaconazole 10 % SOLN, Apply 1 Applicatorful topically daily, Disp: 8 mL, Rfl: 2    metoprolol succinate (TOPROL XL) 50 MG extended release tablet, TAKE 1 TABLET BY MOUTH EVERY DAY, Disp: , Rfl:     TRUEPLUS LANCETS 30G MISC, Test 1 times a day for symptoms of irregular blood glucose., Disp: 100 each, Rfl: 3    albuterol sulfate HFA (PROAIR HFA) 108 (90 Base) MCG/ACT inhaler, Inhale 2 puffs into the lungs every 6 hours as needed for Wheezing, Disp: 1 Inhaler, Rfl: 3    meclizine (ANTIVERT) 25 MG tablet, TAKE 1 TABLET BY MOUTH THREE TIMES A DAY AS NEEDED FOR NAUSEA or dizziness , Disp: 60 tablet, Rfl: 1    Blood Glucose Monitoring Suppl SU, Inject 1 each into the skin daily and prn for diabetes (Patient taking differently: Inject 1 each into the skin daily for diabetes), Disp: 1 Device, Rfl: 0    amLODIPine (NORVASC) 2.5 MG tablet, Take 1 tablet by mouth daily, Disp: 30 tablet, Rfl: 3    Handicap Placard MISC, Duration 5 years  on 2022, Disp: 1 each, Rfl: 0    Loratadine (CLARITIN PO), Take by mouth daily , Disp: , Rfl:     promethazine (PHENERGAN) 25 MG tablet, TAKE 1/2 TABLET BY MOUTH EVERY SIX HOURS AS NEEDED FOR NAUSEA (Patient not taking: Reported on 2021), Disp: 30 tablet, Rfl: 0    ondansetron (ZOFRAN-ODT) 4 MG disintegrating tablet, DISSOLVE 1 TABLET IN MOUTH EVERY EIGHT HOURS AS NEEDED FOR NAUSEA AND VOMITING (Patient not taking: Reported on 2021), Disp: 30 tablet, Rfl: 0    ALLERGIES:   No Known Allergies    FAMILY HISTORY:       Problem Relation Age of Onset    Heart Disease Mother     Diabetes Mother     Cancer Mother         brain tumor    Heart Disease Father     Diabetes Father     Diabetes Sister     Thyroid Disease Sister     Diabetes Brother     Diabetes Brother     Thyroid Disease Brother     Ulcerative Colitis Grandchild          SOCIAL HISTORY:   Social History     Socioeconomic History    Marital status:      Spouse name: Not on file    Number of children: Not on file    Years of education: Not on file    Highest education level: Not on file   Occupational History    Not on file   Tobacco Use    Smoking status: Never Smoker    Smokeless tobacco: Never Used   Vaping Use    Vaping Use: Never used   Substance and Sexual Activity    Alcohol use: No    Drug use: No    Sexual activity: Yes     Partners: Male   Other Topics Concern    Not on file   Social History Narrative    Not on file     Social Determinants of Health     Financial Resource Strain: Low Risk     Difficulty of Paying Living Expenses: Not hard at all   Food Insecurity: No Food Insecurity    Worried About Running Out of Food in the Last Year: Never true    [address] in the Last Year: Never true   Transportation Needs:     Lack of Transportation (Medical): Not on file    Lack of Transportation (Non-Medical):  Not on file   Physical Activity:     Days of Exercise per Week: Not on file    Minutes of Exercise per Session: Not on file   Stress:     Feeling of Stress : Not on file   Social Connections:     Frequency of Communication with Friends and Family: Not on file    Frequency of Social Gatherings with Friends and Family: Not on file    Attends Temple Services: Not on file    Active Member of Clubs or Organizations: Not on file    Attends Club or Organization Meetings: Not on file    Marital Status: Not on file   Intimate Partner Violence:     Fear of Current or Ex-Partner: Not on file    Emotionally Abused: Not on file    Physically Abused: Not on file    Sexually Abused: Not on file   Housing Stability:     Unable to Pay for Housing in the Last Year: Not on file    Number of Places Lived in the Last Year: Not on file    Unstable Housing in the Last Year: Not on file         REVIEW OF SYSTEMS:         Review of Systems   Constitutional: Negative for appetite change, fatigue and unexpected weight change. HENT: Negative for dental problem, sore throat, trouble swallowing and voice change. Eyes: Negative for visual disturbance (glasses). Respiratory: Negative for cough, choking and shortness of breath. Cardiovascular: Negative for chest pain and leg swelling. Gastrointestinal: Negative for abdominal distention, abdominal pain, anal bleeding, blood in stool, constipation, diarrhea, nausea, rectal pain and vomiting. Genitourinary: Negative for difficulty urinating. Musculoskeletal: Negative for back pain, joint swelling and myalgias. Neurological: Negative for dizziness, tremors, weakness, light-headedness, numbness and headaches. Hematological: Does not bruise/bleed easily. Psychiatric/Behavioral: Negative for sleep disturbance. The patient is not nervous/anxious. PHYSICAL EXAMINATION:     Vital signs reviewed per the nursing documentation. BP (!) 158/80   Pulse (!) 41   Temp 97.9 °F (36.6 °C)   Ht 5' 1\" (1.549 m)   Wt 165 lb (74.8 kg)   SpO2 96%   BMI 31.18 kg/m²   Body mass index is 31.18 kg/m². Physical Exam  Vitals and nursing note reviewed. Constitutional:       Appearance: Normal appearance. She is well-developed. HENT:      Head: Normocephalic and atraumatic. Eyes:      Extraocular Movements: Extraocular movements intact. Conjunctiva/sclera: Conjunctivae normal.   Neck:      Thyroid: No thyromegaly. Vascular: No hepatojugular reflux or JVD. Trachea: No tracheal deviation. Cardiovascular:      Rate and Rhythm: Normal rate and regular rhythm. Heart sounds: Normal heart sounds. Pulmonary:      Effort: Pulmonary effort is normal. No respiratory distress. Breath sounds: Normal breath sounds. No wheezing or rales. Abdominal:      General: Bowel sounds are normal. There is no distension. Palpations: Abdomen is soft. There is no hepatomegaly or mass. Tenderness: There is no abdominal tenderness. There is no rebound. Hernia: No hernia is present. Musculoskeletal:         General: No tenderness. Lymphadenopathy:      Cervical: No cervical adenopathy. Skin:     General: Skin is warm and dry. Findings: No bruising, ecchymosis, erythema or rash. Neurological:      Mental Status: She is alert and oriented to person, place, and time. Cranial Nerves: No cranial nerve deficit. Psychiatric:         Mood and Affect: Mood normal.         Behavior: Behavior normal.         Thought Content:  Thought content normal.           LABORATORY DATA: Reviewed  Lab Results   Component Value Date    WBC 10.8 02/26/2022    HGB 14.7 02/26/2022    HCT 42.4 02/26/2022    MCV 86.9 02/26/2022     02/26/2022     02/26/2022    K 3.5 (L) 02/26/2022     02/26/2022    CO2 23 02/26/2022    BUN 11 02/26/2022    CREATININE 0.46 (L) 02/26/2022    LABPROT 7.7 12/28/2012    LABALBU 3.8 11/18/2021    BILITOT 0.86 11/18/2021    ALKPHOS 90 11/18/2021    AST 14 11/18/2021    ALT 21 11/18/2021         Lab Results   Component Value Date    RBC 4.88 02/26/2022    HGB 14.7 02/26/2022    MCV 86.9 02/26/2022    MCH 30.1 02/26/2022    MCHC 34.7 02/26/2022    RDW 12.2 02/26/2022    MPV 11.2 02/26/2022    BASOPCT 0 02/26/2022    LYMPHSABS 2.38 02/26/2022    MONOSABS 0.79 02/26/2022    NEUTROABS 7.52 02/26/2022    EOSABS 0.07 02/26/2022    BASOSABS 0.03 02/26/2022         DIAGNOSTIC TESTING:     XR CHEST (2 VW)    Result Date: 2/26/2022  EXAMINATION: TWO XRAY VIEWS OF THE CHEST 2/26/2022 10:03 am COMPARISON: 11/10/2021 HISTORY: ORDERING SYSTEM PROVIDED HISTORY: cough, shortness of breath TECHNOLOGIST PROVIDED HISTORY: cough, shortness of breath Reason for Exam: cough, shortness of breath FINDINGS: There is some scarring in the left lung base. The lungs are otherwise clear. There are no pulmonary nodules, masses or infiltrates. Heart and mediastinal structures are unremarkable. Bony structures appear normal.     Mild scarring in the left lung base. Examination is otherwise unremarkable. Assessment  1. Ulcerative colitis in remission [facility])        Plan  At present patient is doing well. She does not have symptoms suggestive of active inflammatory bowel disease. Discussed with the patient regarding lab reports. I did change to balsalazide 2 capsules twice daily for maintenance. Advised to see me in the next 1 year. After discussion, patient understood and agreed. Thank you for allowing me to participate in the care of Ms. Penelope Baires. For any further questions please do not hesitate to contact me. I have reviewed and agree with the ROS entered by the MA/LPN. Note is dictated utilizing voice recognition software. Unfortunately this leads to occasional typographical errors.  Please contact our office if you have any questions        Shahzad Kebede MD,FACP, [facility]  Board Certified in Gastroenterology and [address] [place] [address] Gastroenterology  Office #: [phone]

## 2022-03-23 RX ORDER — BALSALAZIDE DISODIUM 750 MG/1
CAPSULE ORAL
Qty: 120 CAPSULE | Refills: 11 | OUTPATIENT
Start: 2022-03-23

## 2022-03-28 DIAGNOSIS — J30.1 CHRONIC SEASONAL ALLERGIC RHINITIS DUE TO POLLEN: ICD-10-CM

## 2022-03-29 RX ORDER — FLUTICASONE PROPIONATE 50 MCG
SPRAY, SUSPENSION (ML) NASAL
Qty: 1 EACH | Refills: 5 | Status: SHIPPED | OUTPATIENT
Start: 2022-03-29 | End: 2022-09-29

## 2022-07-05 ENCOUNTER — OFFICE VISIT (OUTPATIENT)
Dept: PULMONOLOGY | Age: 72
End: 2022-07-05
Payer: MEDICARE

## 2022-07-05 VITALS
OXYGEN SATURATION: 99 % | HEIGHT: 61 IN | WEIGHT: 160 LBS | DIASTOLIC BLOOD PRESSURE: 87 MMHG | SYSTOLIC BLOOD PRESSURE: 135 MMHG | HEART RATE: 68 BPM | BODY MASS INDEX: 30.21 KG/M2 | RESPIRATION RATE: 20 BRPM

## 2022-07-05 DIAGNOSIS — K21.9 GASTROESOPHAGEAL REFLUX DISEASE, UNSPECIFIED WHETHER ESOPHAGITIS PRESENT: ICD-10-CM

## 2022-07-05 DIAGNOSIS — J45.30 MILD PERSISTENT ASTHMA WITHOUT COMPLICATION: ICD-10-CM

## 2022-07-05 DIAGNOSIS — Z86.16 HISTORY OF COVID-19: ICD-10-CM

## 2022-07-05 DIAGNOSIS — J30.1 CHRONIC SEASONAL ALLERGIC RHINITIS DUE TO POLLEN: ICD-10-CM

## 2022-07-05 DIAGNOSIS — E66.09 OBESITY DUE TO EXCESS CALORIES, UNSPECIFIED OBESITY SEVERITY: ICD-10-CM

## 2022-07-05 DIAGNOSIS — J45.40 MODERATE PERSISTENT ASTHMA WITHOUT COMPLICATION: Primary | ICD-10-CM

## 2022-07-05 DIAGNOSIS — R09.82 POST-NASAL DRIP: ICD-10-CM

## 2022-07-05 PROCEDURE — 99214 OFFICE O/P EST MOD 30 MIN: CPT | Performed by: INTERNAL MEDICINE

## 2022-07-05 PROCEDURE — 3017F COLORECTAL CA SCREEN DOC REV: CPT | Performed by: INTERNAL MEDICINE

## 2022-07-05 PROCEDURE — G8400 PT W/DXA NO RESULTS DOC: HCPCS | Performed by: INTERNAL MEDICINE

## 2022-07-05 PROCEDURE — G8417 CALC BMI ABV UP PARAM F/U: HCPCS | Performed by: INTERNAL MEDICINE

## 2022-07-05 PROCEDURE — G8427 DOCREV CUR MEDS BY ELIG CLIN: HCPCS | Performed by: INTERNAL MEDICINE

## 2022-07-05 PROCEDURE — 1036F TOBACCO NON-USER: CPT | Performed by: INTERNAL MEDICINE

## 2022-07-05 PROCEDURE — 1090F PRES/ABSN URINE INCON ASSESS: CPT | Performed by: INTERNAL MEDICINE

## 2022-07-05 PROCEDURE — 1123F ACP DISCUSS/DSCN MKR DOCD: CPT | Performed by: INTERNAL MEDICINE

## 2022-07-05 RX ORDER — PREDNISONE 10 MG/1
TABLET ORAL
Qty: 30 TABLET | Refills: 1 | Status: SHIPPED | OUTPATIENT
Start: 2022-07-05 | End: 2022-08-23

## 2022-07-05 RX ORDER — AZITHROMYCIN 250 MG/1
TABLET, FILM COATED ORAL
Qty: 1 PACKET | Refills: 1 | Status: SHIPPED | OUTPATIENT
Start: 2022-07-05 | End: 2022-08-19 | Stop reason: ALTCHOICE

## 2022-07-05 RX ORDER — FLUTICASONE PROPIONATE 110 UG/1
1 AEROSOL, METERED RESPIRATORY (INHALATION) 2 TIMES DAILY
Qty: 1 EACH | Refills: 5 | Status: SHIPPED | OUTPATIENT
Start: 2022-07-05 | End: 2022-09-14

## 2022-07-05 NOTE — PROGRESS NOTES
PULMONARY OP  PROGRESS NOTE      Patient:  West Craig  YOB: 1950    MRN: [id number]     Acct:        Pt seen and Chart reviewed. Ms. West Craig is here in followup for   1. Moderate persistent asthma without complication    2. Post-nasal drip    3. Gastroesophageal reflux disease, unspecified whether esophagitis present    4. Obesity due to excess calories, unspecified obesity severity    5. Chronic seasonal allergic rhinitis due to pollen    6. History of COVID-19          Pt has not been hospitalized nor has been to the emergency room for acute asthma  Had covid 19 infection since last visit  Has been using meds as recommended. The patient denied having any significant cough. No shortness of breath or wheezing  She uses Flovent  She hardly needs any albuterol  No chest pain or pressure. No acid reflux symptoms. Has no post nasal discharge and she is using Flonase. She has been working on losing weight. She lost 3 lbs.      Subjective:  ROS    Review of Systems -   General ROS: Completed and except as mentioned above were negative   Psychological ROS:  Completed and except as mentioned above were negative  Ophthalmic ROS:  Completed and except as mentioned above were negative  ENT ROS:  Completed and except as mentioned above were negative  Allergy and Immunology ROS:  Completed and except as mentioned above were negative  Hematological and Lymphatic ROS:  Completed and except as mentioned above were negative  Endocrine ROS: Completed and except as mentioned above were negative  Respiratory ROS:  Completed and except as mentioned above were negative  Cardiovascular ROS:  Completed and except as mentioned above were negative  Gastrointestinal ROS: Completed and except as mentioned above were negative  Genito-Urinary ROS:  Completed and except as mentioned above were negative  Musculoskeletal ROS:  Completed and except as mentioned above were negative  Neurological ROS:  Completed and except as mentioned above were negative  Dermatological ROS:  Completed and except as mentioned above were negative          Allergies:  No Known Allergies    Medications:    Current Outpatient Medications:     Handicap Placard MISC, by Does not apply route Expires 6/23/26., Disp: 1 each, Rfl: 0    metFORMIN (GLUCOPHAGE-XR) 500 MG extended release tablet, TAKE 1 TABLET BY MOUTH EVERY DAY WITH BREAKFAST, Disp: 90 tablet, Rfl: 0    atorvastatin (LIPITOR) 10 MG tablet, TAKE 1 TABLET BY MOUTH EVERY DAY, Disp: 90 tablet, Rfl: 0    omeprazole (PRILOSEC) 20 MG delayed release capsule, TAKE 1 CAPSULE BY MOUTH EVERY DAY, Disp: 90 capsule, Rfl: 1    fluticasone (FLONASE) 50 MCG/ACT nasal spray, INSTILL 2 SPRAYS BY NASAL ROUTE EVERY DAY, Disp: 1 each, Rfl: 5    balsalazide (COLAZAL) 750 MG capsule, Take 2 capsules by mouth 2 times daily, Disp: 120 capsule, Rfl: 11    mesalamine (LIALDA) 1.2 g EC tablet, Take 1 tablet by mouth daily (with breakfast), Disp: 30 tablet, Rfl: 3    fluticasone (FLOVENT HFA) 110 MCG/ACT inhaler, Inhale 1 puff into the lungs 2 times daily, Disp: 1 each, Rfl: 3    albuterol sulfate  (90 Base) MCG/ACT inhaler, INHALE 2 PUFFS INTO THE LUNGS EVERY 6 HOURS AS NEEDED FOR WHEEZING OR SHORTNESS OF BREATH, Disp: 18 g, Rfl: 1    blood glucose test strips (TRUE METRIX BLOOD GLUCOSE TEST) strip, TEST 1 TIME DAILY FOR SYMPTOMS OF IRREGULAR BLOOD GLUCOSE., Disp: 100 strip, Rfl: 3    Efinaconazole 10 % SOLN, Apply 1 Applicatorful topically daily, Disp: 8 mL, Rfl: 2    metoprolol succinate (TOPROL XL) 50 MG extended release tablet, TAKE 1 TABLET BY MOUTH EVERY DAY, Disp: , Rfl:     TRUEPLUS LANCETS 30G MISC, Test 1 times a day for symptoms of irregular blood glucose., Disp: 100 each, Rfl: 3    albuterol sulfate HFA (PROAIR HFA) 108 (90 Base) MCG/ACT inhaler, Inhale 2 puffs into the lungs every 6 hours as needed for Wheezing, Disp: 1 Inhaler, Rfl: 3    meclizine (ANTIVERT) 25 MG tablet, TAKE 1 TABLET BY MOUTH THREE TIMES A DAY AS NEEDED FOR NAUSEA or dizziness , Disp: 60 tablet, Rfl: 1    promethazine (PHENERGAN) 25 MG tablet, TAKE 1/2 TABLET BY MOUTH EVERY SIX HOURS AS NEEDED FOR NAUSEA, Disp: 30 tablet, Rfl: 0    ondansetron (ZOFRAN-ODT) 4 MG disintegrating tablet, DISSOLVE 1 TABLET IN MOUTH EVERY EIGHT HOURS AS NEEDED FOR NAUSEA AND VOMITING, Disp: 30 tablet, Rfl: 0    Blood Glucose Monitoring Suppl SU, Inject 1 each into the skin daily and prn for diabetes (Patient taking differently: Inject 1 each into the skin daily for diabetes), Disp: 1 Device, Rfl: 0    amLODIPine (NORVASC) 2.5 MG tablet, Take 1 tablet by mouth daily, Disp: 30 tablet, Rfl: 3    Loratadine (CLARITIN PO), Take by mouth daily , Disp: , Rfl:     BREO ELLIPTA 100-25 MCG/INH AEPB inhaler, , Disp: , Rfl:       Objective:    Physical Exam:  Vitals: /87   Pulse 68   Resp 20   Ht 5' 1\" (1.549 m)   Wt 160 lb (72.6 kg)   SpO2 99% Comment: room air  BMI 30.23 kg/m²   Last 3 weights: Wt Readings from Last 3 Encounters:   07/05/22 160 lb (72.6 kg)   06/23/22 164 lb (74.4 kg)   02/28/22 165 lb (74.8 kg)     Body mass index is 30.23 kg/m². Physical Examination:   PHYSICAL EXAMINATION:  Vitals:    07/05/22 1315   BP: 135/87   Pulse: 68   Resp: 20   SpO2: 99%   Weight: 160 lb (72.6 kg)   Height: 5' 1\" (1.549 m)       Physical Exam    Constitutional: This is a well developed, well nourish. Her BMI was, 30-34.9 - Obesity Grade I 71y.o. year old female who is alert, oriented, cooperative and in no apparent distress. Head:normocephalic and atraumatic. EENT:   ADAM. No conjunctival injections. Septum was midline, mucosa was without erythema, exudates or cobblestoning. No thrush was noted. Mallampati II (soft palate, uvula, fauces visible)  Neck: Supple without thyromegaly. No elevated JVP. Trachea was midline.   Respiratory: Chest was symmetrical without dullness to percussion. Breath sounds bilaterally were clear to auscultation. There were no wheezes, rhonchi or rales. There is no intercostal retraction or use of accessory muscles. No egophony noted. Cardiovascular: Regular without murmur, clicks, gallops or rubs. Abdomen: Slightly rounded and soft without organomegaly. No rebound tenderness, rigidity or guarding was appreciated. Lymphatic: No lymphadenopathy. Musculoskeletal: Normal curvature of the spine. No gross muscle weakness. Extremities:  No lower extremity edema, ulcerations, tenderness, varicosities or erythema. Muscle size, tone and strength are normal.  No involuntary movements are noted. Skin:  Warm and dry. Good color, turgor and pigmentation. No lesions or scars. Neurological/Psychiatric: The patient's general behavior, level of consciousness, thought content and emotional status is normal.       Labs:    Chest x-ray was done at [address] [place] [address] in September 2021 that showed bibasilar subsegmental linear atelectasis    Chest x-ray was also done in November 2021 that showed left basilar discoid atelectasis and enlarged cardiac silhouette and a repeat x-ray on the same day showed the same findings    CXR in feb 2022 showed-  Mild scarring in the left lung base.  Examination is otherwise unremarkable. Assessment:    1. Moderate persistent asthma without complication    2. Post-nasal drip    3. Gastroesophageal reflux disease, unspecified whether esophagitis present    4. Obesity due to excess calories, unspecified obesity severity    5. Chronic seasonal allergic rhinitis due to pollen    6. History of COVID-19            PLAN:    Reviewed the chest x-ray with the patient  Completed Asthma education   Refills were provided.-Flovent 110, wiliam  Patient was recommended to have prednisone and an antibiotic available for use during an exacerbation  Educated and clarified the medication use.   Discussed use, benefit, and side effects of prescribed medications. Barriers to medication compliance addressed. Ina  received counseling on the following healthy behaviors: nutrition, exercise and medication adherence  Recommend flu vaccination in the fall annually. Recommendations given regarding pneumococcal vaccinations. Had the COVID-19 vaccination  Patient is up-to-date with vaccinations from pulmonary perspective. Maintain an active lifestyle. Patient was educated on how to use the respiratory medications. All the questions that the patient  has had were answered to her satisfaction  Home O2 evaluation to be done. Supplemental oxygen was not needed. After reviewing the patient's smoking history and her age patient does not meet the criteria for lung cancer screening as the pt never smoked. We'll see the patient back in 6 months or earlier if needed  She will call us if she is sick and need to be seen sooner  Thank you for having us involved in the care of your patient. Please call us if you have any questions or concerns.         Dennys Beal MD, MD             7/5/2022, 1:22 PM

## 2022-07-14 DIAGNOSIS — J45.40 MODERATE PERSISTENT ASTHMA WITHOUT COMPLICATION: ICD-10-CM

## 2022-07-14 RX ORDER — AZITHROMYCIN 250 MG/1
TABLET, FILM COATED ORAL
Qty: 6 TABLET | Refills: 0 | OUTPATIENT
Start: 2022-07-14

## 2022-08-19 ENCOUNTER — OFFICE VISIT (OUTPATIENT)
Dept: FAMILY MEDICINE CLINIC | Age: 72
End: 2022-08-19
Payer: MEDICARE

## 2022-08-19 ENCOUNTER — HOSPITAL ENCOUNTER (OUTPATIENT)
Age: 72
Setting detail: SPECIMEN
Discharge: HOME | End: 2022-08-19

## 2022-08-19 VITALS
SYSTOLIC BLOOD PRESSURE: 131 MMHG | HEART RATE: 61 BPM | TEMPERATURE: 97.9 F | DIASTOLIC BLOOD PRESSURE: 66 MMHG | OXYGEN SATURATION: 97 %

## 2022-08-19 DIAGNOSIS — R09.89 SYMPTOMS OF UPPER RESPIRATORY INFECTION (URI): ICD-10-CM

## 2022-08-19 DIAGNOSIS — R05.8 COUGH WITH EXPOSURE TO COVID-19 VIRUS: ICD-10-CM

## 2022-08-19 DIAGNOSIS — Z20.822 COUGH WITH EXPOSURE TO COVID-19 VIRUS: ICD-10-CM

## 2022-08-19 DIAGNOSIS — R09.89 SYMPTOMS OF UPPER RESPIRATORY INFECTION (URI): Primary | ICD-10-CM

## 2022-08-19 PROCEDURE — 1090F PRES/ABSN URINE INCON ASSESS: CPT | Performed by: NURSE PRACTITIONER

## 2022-08-19 PROCEDURE — G8427 DOCREV CUR MEDS BY ELIG CLIN: HCPCS | Performed by: NURSE PRACTITIONER

## 2022-08-19 PROCEDURE — G8400 PT W/DXA NO RESULTS DOC: HCPCS | Performed by: NURSE PRACTITIONER

## 2022-08-19 PROCEDURE — 1123F ACP DISCUSS/DSCN MKR DOCD: CPT | Performed by: NURSE PRACTITIONER

## 2022-08-19 PROCEDURE — G8417 CALC BMI ABV UP PARAM F/U: HCPCS | Performed by: NURSE PRACTITIONER

## 2022-08-19 PROCEDURE — 1036F TOBACCO NON-USER: CPT | Performed by: NURSE PRACTITIONER

## 2022-08-19 PROCEDURE — 3017F COLORECTAL CA SCREEN DOC REV: CPT | Performed by: NURSE PRACTITIONER

## 2022-08-19 PROCEDURE — 99213 OFFICE O/P EST LOW 20 MIN: CPT | Performed by: NURSE PRACTITIONER

## 2022-08-19 RX ORDER — AZITHROMYCIN 250 MG/1
250 TABLET, FILM COATED ORAL SEE ADMIN INSTRUCTIONS
Qty: 6 TABLET | Refills: 0 | Status: SHIPPED | OUTPATIENT
Start: 2022-08-19 | End: 2022-08-24

## 2022-08-19 ASSESSMENT — ENCOUNTER SYMPTOMS
HEARTBURN: 0
WHEEZING: 1
COUGH: 1
RHINORRHEA: 1
HEMOPTYSIS: 0
SORE THROAT: 1
SHORTNESS OF BREATH: 0

## 2022-08-19 NOTE — PATIENT INSTRUCTIONS
Follow up with family doctor in 1 week as needed. Return immediately if worse, new symptoms develop, symptoms persist or have any questions or concerns. Push fluids, keep hydrated  Cool mist humidifier bedside  Continue all medications as prescribed  May alternate tylenol/motrin over the counter for pain or fever, take per package instructions. The COVID-19 test that was done today can take 1-6 days for results. Until then you should assume you have this disease and adhere to home isolation as described below. When we get the test results back, one of the following readings will be obtained. A positive test means you have the virus. 2.  An inconclusive test means it wasn't sure if you have the virus or not. An inconclusive test result is treated as a positive result and recommendations  are the same as a positive test result. We may ask you to repeat this test in this circumstance. 3.  A negative test means you probably do not have the virus, but it is not conclusive. If your results of the COVID-19 test is NEGATIVE -     The patient may stop isolation, in consultation with your health care provider, typically when, your healthcare provider has determined that the cause of the illness is NOT COVID-19 and approves your return to work. Please follow up with your physician for evaluation about this. 1/4/2022- the following website is the link to the CDC that discusses the new quarantine/isolation guidelines. https://www.grigsby-bernal.net/. html#print    The point of quarantine guidelines is to minimize the spread of the COVID -19 virus to others, especially the high risk population. But, the new CDC guidelines published 1/4/22 does increase the risk of spreading the virus with the option of a shortened quarantine/isolation if leaving after the first 5 days.   Please read the specific scenario that is applicable to you, listed below, to minimize the spread of this virus. If your results of the COVID-19 test is POSITIVE- you must isolate yourself from others. Isolation:    Isolation is used to separate people with confirmed or suspected COVID-19 from those without COVID-19. People who are in isolation should stay home until its safe for them to be around others. At home, anyone sick or infected should separate from others, or wear a well-fitting mask when they need to be around others. People in isolation should stay in a specific sick room or area and use a separate bathroom if available. Everyone who has presumed or confirmed COVID-19 should stay home and isolate from other people for at least 5 full days (day 0 is the first day of symptoms or the date of the day of the positive viral test for asymptomatic persons). They should wear a well-fitting mask when around others at home and in public for an additional 5 days. People who are confirmed to have COVID-19 or are showing symptoms of COVID-19 need to isolate regardless of their vaccination status. This includes:     People who have a positive viral test for COVID-19, regardless of whether or not they have symptoms. 2.  People with symptoms of COVID-19, including people who are awaiting test results or have not been tested. People with symptoms should isolate even if they do not know if they have been in close contact with someone with COVID-19. Scenario 1:    Ending isolation for people who had COVID-19 and had symptoms  If you had COVID-19 and had symptoms, isolate for at least 5 days. To calculate your 5-day isolation period, day 0 is your first day of symptoms. Day 1 is the first full day after your symptoms developed. You can leave isolation after 5 full days.     You can end isolation after 5 full days if you are fever-free for 24 hours without the use of fever-reducing medication and your other symptoms have improved (Loss of taste and smell may persist for weeks or months after recovery and need not delay the end of isolation). You should continue to wear a well-fitting mask around others at home and in public for 5 additional days (day 6 through day 10) after the end of your 5-day isolation period. If you are unable to wear a mask when around others, you should continue to isolate for a full 10 days. Avoid people who are immunocompromised or at high risk for severe disease, and nursing homes and other high-risk settings, until after at least 10 days. -Do not go to places where you are unable to wear a mask, such as restaurants and some gyms, and avoid eating around others at home and at work until after 10 days   If you continue to have fever or your other symptoms have not improved after 5 days of isolation, you should wait to end your isolation until you are fever-free for 24 hours without the use of fever-reducing medication and your other symptoms have improved. Continue to wear a well-fitting mask. Contact your healthcare provider if you have questions. Do not travel during your 5-day isolation period. After you end isolation, avoid travel until a full 10 days after your first day of symptoms. If you must travel on days 6-10, wear a well-fitting mask when you are around others for the entire duration of travel. If you are unable to wear a mask, you should not travel during the 10 days. Do not go to places where you are unable to wear a mask, such as restaurants and some gyms, and avoid eating around others at home and at work until a full 10 days after your first day of symptoms. If an individual has access to a test and wants to test, the best approach is to use an antigen test (see comment below) towards the end of the 5-day isolation period.  Collect the test sample only if you are fever-free for 24 hours without the use of fever-reducing medication and your other symptoms have improved (loss of taste and smell may persist for weeks or months after recovery and need not delay the end of isolation). If your test result is positive, you should continue to isolate until day 10. If your test result is negative,  you can end isolation, but continue to wear a well-fitting mask around others at home and in public until day 10. Follow additional recommendations for masking and restricting travel as described above. Note that these recommendations on ending isolation do not apply to people with severe COVID-19 or with weakened immune systems (immunocompromised). See section below for recommendations for when to end isolation for these groups. Comment - Negative results should be treated as presumptive. Negative results do not rule out SARS-CoV-2 infection and should not be used as the sole basis for treatment or patient management decisions, including infection control decisions. To improve results, antigen tests should be used twice over a three-day period with at least 24 hours and no more than 48 hours between tests. Scenario 2:    Ending isolation for people who tested positive for COVID-19 but had no symptoms  If you test positive for COVID-19 and never develop symptoms, isolate for at least 5 days. Day 0 is the day of your positive viral test (based on the date you were tested) and day 1 is the first full day after the specimen was collected for your positive test. You can leave isolation after 5 full days. If you continue to have no symptoms, you can end isolation after at least 5 days. You should continue to wear a well-fitting mask around others at home and in public until day 10 (day 6 through day 10). If you are unable to wear a well-fitting mask when around others, you should continue to isolate for 10 days.  Avoid people who are immunocompromised or at high risk for severe disease, and nursing homes and other high-risk settings, until after at least 10 days. -Do not go to places where you are unable to wear a mask, such as restaurants and some gyms, and avoid eating around others at home and at work until after 10 days   If you develop symptoms after testing positive, your 5-day isolation period should start over. Day 0 is your first day of symptoms. Follow the recommendations above for ending isolation for people who had COVID-19 and had symptoms. Do not travel during your 5-day isolation period. After you end isolation, avoid travel until 10 days after the day of your positive test. If you must travel on days 6-10, wear a well-fitting mask when you are around others for the entire duration of travel. If you are unable to wear a mask, you should not travel during the 10 days after your positive test.  Do not go to places where you are unable to wear a mask, such as restaurants and some gyms, and avoid eating around others at home and at work until 10 days after the day of your positive test.  If an individual has access to a test and wants to test, the best approach is to use an antigen test (see comment below) towards the end of the 5-day isolation period. If your test result is positive, you should continue to isolate until day 10. If your test result is negative, you can end isolation, but continue to wear a well-fitting mask around others at home and in public until day 10. Follow additional recommendations for masking and restricting travel described above. Comment-Negative results should be treated as presumptive. Negative results do not rule out SARS-CoV-2 infection and should not be used as the sole basis for treatment or patient management decisions, including infection control decisions. To improve results, antigen tests should be used twice over a three-day period with at least 24 hours and no more than 48 hours between tests.       Scenario 3:    Ending isolation for people who were severely ill with COVID-19 or have a weakened immune system (immunocompromised)  People who are severely ill with COVID-19 (including those who were hospitalized or required intensive care or ventilation support) and people with compromised immune systems might need to isolate at home longer. They may also require testing with a viral test to determine when they can be around others. CDC recommends an isolation period of at least 10 and up to 20 days for people who were severely ill with COVID-19 and for people with weakened immune systems. Consult with your healthcare provider about when you can resume being around other people. People who are immunocompromised should talk to their healthcare provider about the potential for reduced immune responses to COVID-19 vaccines and the need to continue to follow? current prevention measures? (including wearing a well-fitting mask, staying 6 feet apart from others they dont live with, and avoiding crowds and poorly ventilated indoor spaces) to protect themselves against COVID-19 until advised otherwise by their healthcare provider. Close contacts of immunocompromised people - including household members - should also be encouraged to receive all recommended COVID-19 vaccine doses to help protect these people. COVID-19 EXPOSURE    Close Contact Definition:    Someone who was less than 6 feet away from an infected person (laboratory-confirmed or a clinical diagnosis) for a cumulative total of 15 minutes or more over a 24-hour period (for example, three individual 5-minute exposures for a total of 15 minutes). Scenario 4:    Who does NOT need to quarantine after COVID-19 exposure? 1. You are age 25 or older and have received all recommended vaccine doses, including boosters and additional primary shots for some immunocompromised people. 2. You are ages 5-17 years and completed the primary series of COVID-19 vaccines. 3. You had confirmed COVID-19 within the last 90 days (you tested positive using a viral test).     You should wear a well-fitting mask around others for 10 days from the date of your last close contact with someone with COVID-19 (the date of last close contact is considered day 0). Get tested at least 5 days after you last had close contact with someone with COVID-19. If you test positive or develop COVID-19 symptoms, isolate from other people and follow recommendations in the Isolation section below. If you tested positive for COVID-19 with a viral test within the previous 90 days and subsequently recovered and remain without COVID-19 symptoms, you do not need to quarantine or get tested after close contact. You should wear a well-fitting mask around others for 10 days from the date of your last close contact with someone with COVID-19 (the date of last close contact is considered day 0). -Avoid people who are immunocompromised or at high risk for severe disease, and nursing homes and other high-risk settings, until after at least 10 days. Scenario 5:    Who should quarantine after COVID-19 exposure? If you come into close contact with someone with COVID-19, you should quarantine if you are in one of the following groups: 1. You are ages 25 or older and completed the primary series of recommended vaccine, but have not received a recommended booster shot when eligible. 2.You received the single-dose Cu[place]htemoc Fresh vaccine (completing the primary series) over 2 months ago and have not received a recommended booster shot. 3.You are not vaccinated or have not completed a primary vaccine series.    -Stay home and away from other people for at least 5 days (day 0 through day 5) after your last contact with a person who has COVID-19. The date of your exposure is considered day 0. Wear a well-fitting mask when around others at home, if possible.  -For 10 days after your last close contact with someone with COVID-19, watch for fever (100. 4? F or greater), cough, shortness of breath, or other COVID-19 symptoms . -If you develop symptoms, get tested immediately and isolate until you receive your test results. If you test positive, follow isolation recommendations.  -If you do not develop symptoms, get tested at least 5 days after you last had close contact with someone with COVID-19.  -If you test negative, you can leave your home, but continue to wear a well-fitting mask when around others at home and in public until 10 days after your last close contact ith   someone with COVID-19.  -If you test positive, you should isolate for at least 5 days from the date of your positive test (if you do not have symptoms). If you do develop COVID-19 symptoms, isolate for at least 5 days from the date your symptoms began (the date the symptoms started is day 0). Follow recommendations in the isolation section below.  -If you are unable to get a test 5 days after last close contact with someone with COVID-19, you can leave your home after day 5 if you have been without COVID-19 symptoms throughout the 5-day period. Wear a well-fitting mask for 10 days after your date of last close contact when around others at home and in public.  -Avoid people who are immunocompromised or at high risk for severe disease, and nursing homes and other high-risk settings, until after at least 10 days. -If possible, stay away from people you live with, especially people who are at higher risk for getting very sick from COVID-19, as well as others outside your home throughout the full 10 days after your last close contact with someone with COVID-19.  -If you are unable to quarantine, you should wear a well-fitting mask for 10 days when around others at home and in public.  -If you are unable to wear a mask when around others, you should continue to quarantine for 10 days. Avoid people who are immunocompromised or at high risk for severe disease, and nursing homes and other high-risk settings, until after at least 10 days.  -Do not travel during your 5-day quarantine period.  Get tested at least 5 days after your last close contact and make sure your test result is negative and you remain without symptoms before traveling. If you dont get tested, delay travel until 10 days after your last close contact with a person with COVID-19. If you must travel before the 10 days are completed, wear a well-fitting mask when you are around others for the entire duration of travel during the 10 days. If you are unable to wear a mask, you should not travel during the 10 days.  -Do not go to places where you are unable to wear a mask, such as restaurants and some gyms, and avoid eating around others at home and at work until after 10 days after your last close contact with someone with COVID-19. Prevention steps for People with positive or inconclusive test results or suspected  COVID-19 (including persons under investigation) who do not need to be hospitalized  and   People with confirmed COVID-19 who were hospitalized and determined to be medically stable to go home      Your healthcare provider and public health staff will evaluate whether you can be cared for at home. If it is determined that you do not need to be hospitalized and can be isolated at home, you will be monitored by staff from your health department. You should follow the prevention steps below until a healthcare provider or local or state health department says you can return to your normal activities. Stay home except to get medical care  People who are mildly ill with COVID-19 are able to isolate at home during their illness. You should restrict activities outside your home, except for getting medical care. Do not go to work, school, or public areas. Avoid using public transportation, ride-sharing, or taxis. Separate yourself from other people and animals in your home  People: As much as possible, you should stay in a specific room (sick room) and away from other people in your home. Also, you should use a separate bathroom, if available. Animals:  You should restrict contact with pets and other animals while you are sick with COVID-19, just like you would around other people. When possible, have another member of your household care for your animals while you are sick. If you are sick with COVID-19, avoid contact with your pet, including petting, snuggling, being kissed or licked, and sharing food. If you must care for your pet or be around animals while you are sick, wash your hands before and after you interact with pets and wear a facemask. Wear a facemask  You should wear a well-fitting facemask when you are around other people (as should the people around you) or pets and before you enter a healthcare providers office. Clean your hands often  Wash your hands often with soap and water for at least 20 seconds, especially after blowing your nose, coughing, or sneezing; going to the bathroom; and before eating or preparing food. If soap and water are not readily available, use an alcohol-based hand  with at least 60% alcohol, covering all surfaces of your hands and rubbing them together until they feel dry. Soap and water are the best option if hands are visibly dirty. Avoid touching your eyes, nose, and mouth with unwashed hands. Avoid sharing personal household items  You should not share dishes, drinking glasses, cups, eating utensils, towels, or bedding with other people or pets in your home. After using these items, they should be washed thoroughly with soap and water. Monitor your symptoms  Seek prompt medical attention if your illness is worsening (e.g., difficulty breathing). Before seeking care, call your healthcare provider and tell them that you have, or are being evaluated for, COVID-19. Put on a facemask before you enter the facility. These steps will help the healthcare providers office to keep other people in the office or waiting room from getting infected or exposed.  Persons who are placed under active monitoring or facilitated self-monitoring should follow instructions provided by their local health department or occupational health professionals, as appropriate. When working with your local health department check their available hours. If you have a medical emergency and need to call 911, notify the dispatch personnel that you have, or are being evaluated for COVID-19. If possible, put on a facemask before emergency medical services arrive. Oralee Font- keeps someone who might have been exposed to the virus away from others  Isolation - keeps someone who is infected with the virus away from others, even in their homes    Example 1    You have close contact with an individual who has COVID-19. Your patient will not have further contact. Plan - Your patient is quarantined from the last day of contact for 5-10 days    Example 2   You live with someone who has COVID-19 but can avoid further contact. Plan - Your patient is quarantined for 5-10 days starting when the person with COVID-19 begins home isolation. Example 3    You are under quarantine for COVID-19 exposure, and have additional close contact with someone else who has COVID-19. Plan - Your patient must restart quarantine from the last COVID-19 exposure. Example 4   You live with someone who has COVID-19 and cannot avoid close contact from them. Plan - Your patient is quarantined while the other person is isolating and for 5-10 days after covid-19 person meets the criteria to end home isolation. Treatments are under investigation for outpatients and can be considered for patients with mild to moderate COVID-19 who are not on supplemental oxygen and are not hospitalized but who are at [address] [place] [address] for clinical progression have had onset of symptoms within the past 3-10 days. HIGH RISK is defined as patients who meet at least one of the following criteria:    Have a body mass index (BMI) ? 35    Have chronic kidney disease    Have diabetes    Have immunosuppressive disease    Are currently receiving immunosuppressive treatment    Are ?72years of age    Are ?54years of age AND have  *cardiovascular disease, OR  *hypertension, OR  *chronic obstructive pulmonary disease/other chronic respiratory disease. Are 15- 16years of age AND have  *BMI ? 85th percentile for their age and gender based on CDC growth charts, [email or web address] , OR  *sickle cell disease, OR  *congenital or acquired heart disease, OR  *neurodevelopmental disorders, for example, cerebral palsy, OR  *a medical-related technological dependence, for example, tracheostomy, gastrostomy, or positive pressure ventilation (not related to   COVID-19), OR  *asthma, reactive airway or other chronic respiratory disease that requires daily medication for control. Other risk factors: Moderate/severe dementia  Cancer being treated with palliative care  Cirrhosis  Pregnancy  Breast feeding  Weight less than 40Kg (88lbs)        WELL FITTING MASK      Cloth Mask    Cloth Masks can be made from a variety of fabrics and many types of cloth masks are available. Wear cloth masks with  A proper fit over your nose and mouth to prevent leaks  Multiple layers of tightly woven, breathable fabric  Nose wire  Fabric that blocks light when held up to bright light source    Do NOT wear cloth masks with  Gaps around the sides of the face or nose  Exhalation valves, vents, or other openings (see example)  Single-layer fabric or those made of thin fabric that dont block light      Disposable Masks    Disposable face masks are widely available. They are sometimes referred to as surgical masks or medical procedure masks.     Wear disposable masks with  A proper fit over your nose and mouth to prevent leaks  Multiple layers of non-woven material  Nose wire    Do NOT wear disposable masks with  Gaps around the sides of the face or nose (see example)  Wet or dirty material    Ways to have better fit and extra protection with cloth and disposable masks  Wear two masks (disposable mask underneath AND cloth mask on top)  Combine either a cloth mask or disposable mask with a fitter or brace  Knot and tuck ear loops of a 3-ply mask where they join the edge of the mask  For disposable masks, fold and tuck the unneeded material under the edges. (For instructions, see the following https://youtu. be/[email or web address])  Use masks that attach behind the neck and head with either elastic bands or ties (instead of ear loops)

## 2022-08-19 NOTE — PROGRESS NOTES
[address] [facility]  [facility] [address] [facility] [address]  Dept: [phone]  Dept Fax: [phone]    Derrick Zelaya is a 71 y.o. female who presents to the urgent care today for her medical conditions/complaints as notedbelow. Derrick Zelaya is c/o of Cough (Onset 2 days ago, dry) and Nasal Congestion      HPI:     71 yr old female with history asthma presents for dry, np cough and nasal congestion for 2 days. She would like covid testing.   + covid exposure  Covid Vaccinated? 4 doses  Has had covid in distant past and shawna well      Cough  This is a new problem. The current episode started in the past 7 days (x2d). The problem has been unchanged. The problem occurs hourly. The cough is Non-productive. Associated symptoms include nasal congestion, postnasal drip, rhinorrhea, a sore throat (improved) and wheezing. Pertinent negatives include no chest pain, chills, ear congestion, ear pain, fever, headaches, heartburn, hemoptysis, myalgias, rash, shortness of breath, sweats or weight loss. Nothing aggravates the symptoms. She has tried a beta-agonist inhaler (cough drops) for the symptoms. Her past medical history is significant for asthma, bronchitis and environmental allergies.      Past Medical History:   Diagnosis Date    Anginal pain ([address]     hx of angina    Bronchitis 2014    Colitis, ulcerative ([facility] [address]     Diabetes mellitus (HCC)     GERD (gastroesophageal reflux disease)     Hypercholesterolemia     Hyperlipidemia     Hypertension, essential, benign 12/4/2017    Meniere disease     Obesity (BMI 30.0-34.9) 5/28/2014    Ulcerative pancolitis without complication (HCC)     Vitamin D deficiency         Current Outpatient Medications   Medication Sig Dispense Refill    azithromycin (ZITHROMAX) 250 MG tablet Take 1 tablet by mouth See Admin Instructions for 5 days 500mg on day 1 followed by 250mg on days 2 - 5 6 tablet 0    fluticasone (FLOVENT HFA) 110 MCG/ACT inhaler Inhale 1 puff into the lungs 2 times daily 1 each 5    predniSONE (DELTASONE) 10 MG tablet Tapered dose 40mg x 3 days. .. 30mg x 3 days. .. 20mg x 3 days . Jimy Gutter .10mg x3days . ..then off 30 tablet 1    Handicap Placard MISC by Does not apply route Expires 6/23/26. 1 each 0    metFORMIN (GLUCOPHAGE-XR) 500 MG extended release tablet TAKE 1 TABLET BY MOUTH EVERY DAY WITH BREAKFAST 90 tablet 0    atorvastatin (LIPITOR) 10 MG tablet TAKE 1 TABLET BY MOUTH EVERY DAY 90 tablet 0    omeprazole (PRILOSEC) 20 MG delayed release capsule TAKE 1 CAPSULE BY MOUTH EVERY DAY 90 capsule 1    fluticasone (FLONASE) 50 MCG/ACT nasal spray INSTILL 2 SPRAYS BY NASAL ROUTE EVERY DAY 1 each 5    balsalazide (COLAZAL) 750 MG capsule Take 2 capsules by mouth 2 times daily 120 capsule 11    mesalamine (LIALDA) 1.2 g EC tablet Take 1 tablet by mouth daily (with breakfast) 30 tablet 3    albuterol sulfate  (90 Base) MCG/ACT inhaler INHALE 2 PUFFS INTO THE LUNGS EVERY 6 HOURS AS NEEDED FOR WHEEZING OR SHORTNESS OF BREATH 18 g 1    blood glucose test strips (TRUE METRIX BLOOD GLUCOSE TEST) strip TEST 1 TIME DAILY FOR SYMPTOMS OF IRREGULAR BLOOD GLUCOSE. 100 strip 3    Efinaconazole 10 % SOLN Apply 1 Applicatorful topically daily 8 mL 2    metoprolol succinate (TOPROL XL) 50 MG extended release tablet TAKE 1 TABLET BY MOUTH EVERY DAY      TRUEPLUS LANCETS 30G MISC Test 1 times a day for symptoms of irregular blood glucose.  100 each 3    albuterol sulfate HFA (PROAIR HFA) 108 (90 Base) MCG/ACT inhaler Inhale 2 puffs into the lungs every 6 hours as needed for Wheezing 1 Inhaler 3    meclizine (ANTIVERT) 25 MG tablet TAKE 1 TABLET BY MOUTH THREE TIMES A DAY AS NEEDED FOR NAUSEA or dizziness  60 tablet 1    promethazine (PHENERGAN) 25 MG tablet TAKE 1/2 TABLET BY MOUTH EVERY SIX HOURS AS NEEDED FOR NAUSEA 30 tablet 0    ondansetron (ZOFRAN-ODT) 4 MG disintegrating tablet DISSOLVE 1 TABLET IN MOUTH EVERY Pupils: Pupils are equal, round, and reactive to light. Cardiovascular:      Rate and Rhythm: Normal rate and regular rhythm. Pulses: Normal pulses. Heart sounds: Normal heart sounds. Pulmonary:      Effort: Pulmonary effort is normal. No respiratory distress. Breath sounds: Normal breath sounds. No stridor. No wheezing, rhonchi or rales. Chest:      Chest wall: No tenderness. Abdominal:      General: Bowel sounds are normal. There is no distension. Palpations: Abdomen is soft. Tenderness: There is no abdominal tenderness. Musculoskeletal:         General: No tenderness or deformity. Normal range of motion. Cervical back: Normal range of motion and neck supple. Lymphadenopathy:      Cervical: No cervical adenopathy. Skin:     General: Skin is warm and dry. Capillary Refill: Capillary refill takes less than 2 seconds. Findings: No rash. Comments: No rash to visible skin   Neurological:      General: No focal deficit present. Mental Status: She is alert and oriented to person, place, and time. Motor: No abnormal muscle tone. Coordination: Coordination normal.   Psychiatric:         Mood and Affect: Mood normal.         Behavior: Behavior normal.     /66 (Site: Left Upper Arm, Position: Sitting, Cuff Size: Large Adult)   Pulse 61   Temp 97.9 °F (36.6 °C) (Infrared)   SpO2 97%     Assessment:       Diagnosis Orders   1. Symptoms of upper respiratory infection (URI)  COVID-19    azithromycin (ZITHROMAX) 250 MG tablet      2. Cough with exposure to COVID-19 virus  COVID-19    azithromycin (ZITHROMAX) 250 MG tablet          Plan:    Based on PMH and sx severity will tx as bacterial  Inhaler from home   increase flonase to BID  Zpak rx  PT IS NOT INTERETSED IN PAXLOVID  Reviewed over-the-counter treatments for symptom management. Will send out COVID19 testing. Possible treatment alterations based on the results.   Patient instructed to self-quarantine until testing results are back. Patient instructed not to return to work until results are back. Tylenol as needed for fever/pain. Encouraged adequate hydration and rest.  The patient indicates understanding of these issues and agrees with the plan. Educational materials provided on AVS.  Follow up if symptoms do not improve/worsen. Discussed symptoms that will warrant urgent ED evaluation/treatment. Return if symptoms worsen or fail to improve, for Make an Appt. with your Primary Care in 1 week. Orders Placed This Encounter   Medications    azithromycin (ZITHROMAX) 250 MG tablet     Sig: Take 1 tablet by mouth See Admin Instructions for 5 days 500mg on day 1 followed by 250mg on days 2 - 5     Dispense:  6 tablet     Refill:  0           Patient given educational materials - see patient instructions. Discussed use, benefit, and side effects of prescribed medications. All patient questions answered. Pt voicedunderstanding.     Electronically signed by VIBHA Sheth CNP on 8/19/2022 at 3:17 PM

## 2022-08-20 LAB
SARS-COV-2: NORMAL
SARS-COV-2: NOT DETECTED
SOURCE: NORMAL

## 2022-08-23 ENCOUNTER — TELEPHONE (OUTPATIENT)
Dept: FAMILY MEDICINE CLINIC | Age: 72
End: 2022-08-23

## 2022-08-23 DIAGNOSIS — R05.9 COUGH: Primary | ICD-10-CM

## 2022-08-23 RX ORDER — BENZONATATE 100 MG/1
100 CAPSULE ORAL 3 TIMES DAILY PRN
Qty: 20 CAPSULE | Refills: 0 | Status: SHIPPED | OUTPATIENT
Start: 2022-08-23 | End: 2022-08-30

## 2022-08-23 RX ORDER — PREDNISONE 20 MG/1
20 TABLET ORAL DAILY
Qty: 5 TABLET | Refills: 0 | Status: SHIPPED | OUTPATIENT
Start: 2022-08-23 | End: 2022-08-28

## 2022-08-23 NOTE — TELEPHONE ENCOUNTER
Patient was seen by Lukasz Valente on 8/19/22 and was informed during visit that prednisone and benzonate prescriptions could be sent to pharmacy for symptoms. Patient has tested negative for covid and would like to have medications sent to pharmacy to aid with symptoms.

## 2022-08-25 ENCOUNTER — HOSPITAL ENCOUNTER (OUTPATIENT)
Age: 72
Discharge: HOME | End: 2022-08-25
Payer: MEDICARE

## 2022-08-25 ENCOUNTER — OFFICE VISIT (OUTPATIENT)
Dept: FAMILY MEDICINE CLINIC | Age: 72
End: 2022-08-25
Payer: MEDICARE

## 2022-08-25 ENCOUNTER — HOSPITAL ENCOUNTER (OUTPATIENT)
Age: 72
Discharge: HOME | End: 2022-08-27
Payer: MEDICARE

## 2022-08-25 ENCOUNTER — TELEPHONE (OUTPATIENT)
Dept: FAMILY MEDICINE CLINIC | Age: 72
End: 2022-08-25

## 2022-08-25 ENCOUNTER — HOSPITAL ENCOUNTER (OUTPATIENT)
Dept: GENERAL RADIOLOGY | Age: 72
Discharge: HOME | End: 2022-08-27
Payer: MEDICARE

## 2022-08-25 VITALS
OXYGEN SATURATION: 98 % | HEART RATE: 41 BPM | TEMPERATURE: 97.5 F | DIASTOLIC BLOOD PRESSURE: 73 MMHG | SYSTOLIC BLOOD PRESSURE: 131 MMHG

## 2022-08-25 DIAGNOSIS — R05.3 PERSISTENT COUGH: ICD-10-CM

## 2022-08-25 DIAGNOSIS — R00.1 BRADYCARDIA: ICD-10-CM

## 2022-08-25 DIAGNOSIS — J45.41 MODERATE PERSISTENT ASTHMATIC BRONCHITIS WITH ACUTE EXACERBATION: ICD-10-CM

## 2022-08-25 DIAGNOSIS — J40 BRONCHITIS: ICD-10-CM

## 2022-08-25 DIAGNOSIS — J45.41 MODERATE PERSISTENT ASTHMATIC BRONCHITIS WITH ACUTE EXACERBATION: Primary | ICD-10-CM

## 2022-08-25 PROCEDURE — 93005 ELECTROCARDIOGRAM TRACING: CPT

## 2022-08-25 PROCEDURE — 71046 X-RAY EXAM CHEST 2 VIEWS: CPT

## 2022-08-25 PROCEDURE — 1123F ACP DISCUSS/DSCN MKR DOCD: CPT

## 2022-08-25 PROCEDURE — 99214 OFFICE O/P EST MOD 30 MIN: CPT

## 2022-08-25 RX ORDER — LEVOFLOXACIN 750 MG/1
750 TABLET ORAL DAILY
Qty: 7 TABLET | Refills: 0 | Status: SHIPPED | OUTPATIENT
Start: 2022-08-25 | End: 2022-09-01

## 2022-08-25 RX ORDER — GUAIFENESIN 600 MG/1
1200 TABLET, EXTENDED RELEASE ORAL 2 TIMES DAILY
Qty: 40 TABLET | Refills: 0 | Status: SHIPPED | OUTPATIENT
Start: 2022-08-25 | End: 2022-09-04

## 2022-08-25 ASSESSMENT — ENCOUNTER SYMPTOMS
SORE THROAT: 0
RHINORRHEA: 0
DIARRHEA: 0
SPUTUM PRODUCTION: 1
SHORTNESS OF BREATH: 1
ABDOMINAL PAIN: 0
WHEEZING: 1
HEMOPTYSIS: 0
VOMITING: 0
CHEST TIGHTNESS: 0
COUGH: 1

## 2022-08-25 NOTE — PROGRESS NOTES
[address] [facility]  Located within [facility] [address] [facility] [address]  Dept: [phone]  Dept Fax: [phone]    Viridiana Boswell is a 71 y.o. female who presents to the urgent care today for her medical conditions/complaints as notedbelow. Viridiana Boswell is c/o of Wheezing (Onset over 1 week ago, has been seen for this previously, was placed on zpak, prednisone, and tessalon but symptoms not improved) and Chest Congestion      HPI:     Patient was seen on 8/19/2022 for a cough and nasal congestion. Patient was prescribed a zpak. Patient called 2 days later for prednisone and tessalon. Patient finished her zpak and is currently taking tessalon for cough and prednisone. COVID test was completed and was negative. Patient presents to the walk in today for continued wheezing and chest congestion      Wheezing   This is a recurrent problem. The current episode started in the past 7 days. The problem occurs constantly. The problem has been gradually worsening. Associated symptoms include coughing, shortness of breath and sputum production. Pertinent negatives include no abdominal pain, chest pain, chills, coryza, diarrhea, ear pain, fever, headaches, hemoptysis, neck pain, rash, rhinorrhea, sore throat or vomiting. The symptoms are aggravated by lying flat. She has tried beta agonist inhalers and oral steroids (tessalon and prednisone and a zpak) for the symptoms. Her past medical history is significant for asthma and pneumonia. There is no history of bronchiolitis, CAD, chronic lung disease, COPD or heart failure. Cough  This is a recurrent problem. The current episode started in the past 7 days. The problem has been gradually worsening. The problem occurs constantly. The cough is Non-productive. Associated symptoms include postnasal drip, shortness of breath and wheezing.  Pertinent negatives include no chest pain, chills, ear pain, fever, headaches, hemoptysis, nasal congestion, rash, rhinorrhea or sore throat. The symptoms are aggravated by lying down. She has tried a beta-agonist inhaler, prescription cough suppressant and steroid inhaler for the symptoms. The treatment provided mild relief. Her past medical history is significant for asthma, bronchitis, environmental allergies and pneumonia. There is no history of bronchiectasis, COPD or emphysema.      Past Medical History:   Diagnosis Date    Anginal pain ([facility] [address]     hx of angina    Bronchitis 2014    Colitis, ulcerative ([facility] [address]     Diabetes mellitus ([facility] [address]     GERD (gastroesophageal reflux disease)     Hypercholesterolemia     Hyperlipidemia     Hypertension, essential, benign 12/4/2017    Meniere disease     Obesity (BMI 30.0-34.9) 5/28/2014    Ulcerative pancolitis without complication (HCC)     Vitamin D deficiency         Current Outpatient Medications   Medication Sig Dispense Refill    guaiFENesin (MUCINEX) 600 MG extended release tablet Take 2 tablets by mouth 2 times daily for 10 days 40 tablet 0    levoFLOXacin (LEVAQUIN) 750 MG tablet Take 1 tablet by mouth daily for 7 days 7 tablet 0    benzonatate (TESSALON) 100 MG capsule Take 1 capsule by mouth 3 times daily as needed for Cough 20 capsule 0    predniSONE (DELTASONE) 20 MG tablet Take 1 tablet by mouth daily for 5 days 5 tablet 0    fluticasone (FLOVENT HFA) 110 MCG/ACT inhaler Inhale 1 puff into the lungs 2 times daily 1 each 5    Handicap Placard MISC by Does not apply route Expires 6/23/26. 1 each 0    metFORMIN (GLUCOPHAGE-XR) 500 MG extended release tablet TAKE 1 TABLET BY MOUTH EVERY DAY WITH BREAKFAST 90 tablet 0    atorvastatin (LIPITOR) 10 MG tablet TAKE 1 TABLET BY MOUTH EVERY DAY 90 tablet 0    omeprazole (PRILOSEC) 20 MG delayed release capsule TAKE 1 CAPSULE BY MOUTH EVERY DAY 90 capsule 1    fluticasone (FLONASE) 50 MCG/ACT nasal spray INSTILL 2 SPRAYS BY NASAL ROUTE EVERY DAY 1 each 5    balsalazide (COLAZAL) 750 MG capsule Take 2 capsules by mouth 2 times daily 120 capsule 11    mesalamine (LIALDA) 1.2 g EC tablet Take 1 tablet by mouth daily (with breakfast) 30 tablet 3    albuterol sulfate  (90 Base) MCG/ACT inhaler INHALE 2 PUFFS INTO THE LUNGS EVERY 6 HOURS AS NEEDED FOR WHEEZING OR SHORTNESS OF BREATH 18 g 1    blood glucose test strips (TRUE METRIX BLOOD GLUCOSE TEST) strip TEST 1 TIME DAILY FOR SYMPTOMS OF IRREGULAR BLOOD GLUCOSE. 100 strip 3    Efinaconazole 10 % SOLN Apply 1 Applicatorful topically daily 8 mL 2    metoprolol succinate (TOPROL XL) 50 MG extended release tablet TAKE 1 TABLET BY MOUTH EVERY DAY      TRUEPLUS LANCETS 30G MISC Test 1 times a day for symptoms of irregular blood glucose. 100 each 3    albuterol sulfate HFA (PROAIR HFA) 108 (90 Base) MCG/ACT inhaler Inhale 2 puffs into the lungs every 6 hours as needed for Wheezing 1 Inhaler 3    meclizine (ANTIVERT) 25 MG tablet TAKE 1 TABLET BY MOUTH THREE TIMES A DAY AS NEEDED FOR NAUSEA or dizziness  60 tablet 1    promethazine (PHENERGAN) 25 MG tablet TAKE 1/2 TABLET BY MOUTH EVERY SIX HOURS AS NEEDED FOR NAUSEA 30 tablet 0    ondansetron (ZOFRAN-ODT) 4 MG disintegrating tablet DISSOLVE 1 TABLET IN MOUTH EVERY EIGHT HOURS AS NEEDED FOR NAUSEA AND VOMITING 30 tablet 0    Blood Glucose Monitoring Suppl SU Inject 1 each into the skin daily and prn for diabetes (Patient taking differently: Inject 1 each into the skin daily for diabetes) 1 Device 0    amLODIPine (NORVASC) 2.5 MG tablet Take 1 tablet by mouth daily 30 tablet 3    Loratadine (CLARITIN PO) Take by mouth daily        No current facility-administered medications for this visit. No Known Allergies    Subjective:      Review of Systems   Constitutional:  Negative for chills and fever. HENT:  Positive for postnasal drip. Negative for ear pain, rhinorrhea and sore throat. Respiratory:  Positive for cough, sputum production, shortness of breath and wheezing.  Negative for hemoptysis smoke.  -Continue tessalon, Flonase, Claritin, and prednisone  -mucinex Rx  -Chest xray ordered, will call with results  -EKG ordered, will call with results  -Recommend following up the cardiology EP for bradycardia  -Follow up with PCP  -Follow up with pulmonologist  -Patient agreeable to treatment plan.  -Educational materials provided on AVS.  -Follow up if symptoms do not improve/worsen. Return if symptoms worsen or fail to improve. Orders Placed This Encounter   Medications    guaiFENesin (MUCINEX) 600 MG extended release tablet     Sig: Take 2 tablets by mouth 2 times daily for 10 days     Dispense:  40 tablet     Refill:  0    levoFLOXacin (LEVAQUIN) 750 MG tablet     Sig: Take 1 tablet by mouth daily for 7 days     Dispense:  7 tablet     Refill:  0         Patient given educational materials - see patient instructions. Discussed use, benefit, and side effects of prescribed medications. All patient questions answered. Pt voiced understanding.     Electronically signed by VIBHA Raman NP on 8/25/2022 at 3:52 PM

## 2022-08-25 NOTE — TELEPHONE ENCOUNTER
Patient called in and states she was seen 8/19 by Olaf Streeter. Patient was prescribed azithromycin, prednisone and benzonatate. Patient states she has finished the azithromycin, just started the prednisone Monday or Tuesday but is not feeling any better. Patient states she has rattling in her chest and is unable to lay flat. Patient wanted recommendations or to know if she needed to be seen again.

## 2022-08-26 ENCOUNTER — TELEPHONE (OUTPATIENT)
Dept: FAMILY MEDICINE CLINIC | Age: 72
End: 2022-08-26

## 2022-08-26 DIAGNOSIS — J98.11 ATELECTASIS: Primary | ICD-10-CM

## 2022-08-26 LAB
EKG ATRIAL RATE: 83 BPM
EKG P AXIS: 47 DEGREES
EKG P-R INTERVAL: 148 MS
EKG Q-T INTERVAL: 408 MS
EKG QRS DURATION: 136 MS
EKG QTC CALCULATION (BAZETT): 479 MS
EKG R AXIS: -16 DEGREES
EKG T AXIS: -14 DEGREES
EKG VENTRICULAR RATE: 83 BPM

## 2022-09-07 ENCOUNTER — OFFICE VISIT (OUTPATIENT)
Dept: PODIATRY | Age: 72
End: 2022-09-07
Payer: MEDICARE

## 2022-09-07 VITALS — HEIGHT: 61 IN | WEIGHT: 160 LBS | BODY MASS INDEX: 30.21 KG/M2

## 2022-09-07 DIAGNOSIS — M77.41 METATARSALGIA, RIGHT FOOT: ICD-10-CM

## 2022-09-07 DIAGNOSIS — M79.671 FOOT PAIN, RIGHT: Primary | ICD-10-CM

## 2022-09-07 DIAGNOSIS — M62.89 POSTURAL FATIGUE: ICD-10-CM

## 2022-09-07 DIAGNOSIS — Z98.890 HISTORY OF FOOT SURGERY: ICD-10-CM

## 2022-09-07 DIAGNOSIS — M77.42 METATARSALGIA, LEFT FOOT: ICD-10-CM

## 2022-09-07 DIAGNOSIS — M79.672 FOOT PAIN, LEFT: ICD-10-CM

## 2022-09-07 PROCEDURE — G8427 DOCREV CUR MEDS BY ELIG CLIN: HCPCS | Performed by: PODIATRIST

## 2022-09-07 PROCEDURE — G8417 CALC BMI ABV UP PARAM F/U: HCPCS | Performed by: PODIATRIST

## 2022-09-07 PROCEDURE — G8400 PT W/DXA NO RESULTS DOC: HCPCS | Performed by: PODIATRIST

## 2022-09-07 PROCEDURE — 3017F COLORECTAL CA SCREEN DOC REV: CPT | Performed by: PODIATRIST

## 2022-09-07 PROCEDURE — 1090F PRES/ABSN URINE INCON ASSESS: CPT | Performed by: PODIATRIST

## 2022-09-07 PROCEDURE — 99214 OFFICE O/P EST MOD 30 MIN: CPT | Performed by: PODIATRIST

## 2022-09-07 PROCEDURE — 1123F ACP DISCUSS/DSCN MKR DOCD: CPT | Performed by: PODIATRIST

## 2022-09-07 PROCEDURE — 1036F TOBACCO NON-USER: CPT | Performed by: PODIATRIST

## 2022-09-07 NOTE — PROGRESS NOTES
[facility]  New Patient History and Physical    Chief Complaint:   Chief Complaint   Patient presents with    Foot Pain     B/l foot pain       HPI: Kimberley Malave is a 71 y.o. female who presents to the office today complaining of bilateral foot pain. Both of her feet were very painful 2 months ago. She had pain with walking, pain standing, pain left lower leg. She rested and stopped walking for exercise, her pain is gone now. She would like to walk more. Has a history of bilateral first mpj fusions. Not wearing orthotics currently. Currently denies F/C/N/V. No Known Allergies    Past Medical History:   Diagnosis Date    Anginal pain ([address]     hx of angina    Bronchitis 2014    Colitis, ulcerative ([address]     Diabetes mellitus (HCC)     GERD (gastroesophageal reflux disease)     Hypercholesterolemia     Hyperlipidemia     Hypertension, essential, benign 12/4/2017    Meniere disease     Obesity (BMI 30.0-34.9) 5/28/2014    Ulcerative pancolitis without complication ([facility] [address]     Vitamin D deficiency        Prior to Admission medications    Medication Sig Start Date End Date Taking?  Authorizing Provider   Respiratory Therapy Supplies (SPIROMETER) KIT Take 1 inhalation by mouth 6 times daily 8/26/22 9/25/22 Yes VIBHA Hoskins - NP   fluticasone [facility] HFA) 110 MCG/ACT inhaler Inhale 1 puff into the lungs 2 times daily 7/5/22 7/5/23 Yes Hayden Lerner MD   Handicap Placard MISC by Does not apply route Expires 6/23/26. 6/23/22  Yes Fam Nunez MD   metFORMIN (GLUCOPHAGE-XR) 500 MG extended release tablet TAKE 1 TABLET BY MOUTH EVERY DAY WITH BREAKFAST 6/17/22  Yes Smitha Khan, APRN - CNP   atorvastatin (LIPITOR) 10 MG tablet TAKE 1 TABLET BY MOUTH EVERY DAY 6/15/22  Yes Fam Nunez MD   omeprazole (PRILOSEC) 20 MG delayed release capsule TAKE 1 CAPSULE BY MOUTH EVERY DAY 5/12/22  Yes Fam Nunez MD   fluticasone (FLONASE) 50 MCG/ACT nasal spray INSTILL 2 SPRAYS BY NASAL Laterality Date    COLONOSCOPY  8/12/2011    colitis in the left colon    COLONOSCOPY  12/15/2015    ulcerative colitis in remission    COLONOSCOPY N/A 7/2/2019    COLONOSCOPY WITH BIOPSY performed by Nain Maldonado MD at [facility] SURGERY  2011    bilateral    ROTATOR CUFF REPAIR  2011    L shoulder       Family History   Problem Relation Age of Onset    Heart Disease Mother     Diabetes Mother     Cancer Mother         brain tumor    Heart Disease Father     Diabetes Father     Diabetes Sister     Thyroid Disease Sister     Diabetes Brother     Diabetes Brother     Thyroid Disease Brother     Ulcerative Colitis Grandchild        Social History     Tobacco Use    Smoking status: Never    Smokeless tobacco: Never   Substance Use Topics    Alcohol use: No       Review of Systems    Lower Extremity Physical Examination:     Vitals: There were no vitals filed for this visit. General: AAO x 3 in NAD. Vascular: DP and PT pulses palpable 2/4, Bilateral.  CFT <3 seconds, Bilateral.  Hair growth present to the level of the digits, Bilateral.  Edema absent, Bilateral.  Varicosities absent, Bilateral. Erythema absent, Bilateral    Neurological:  Sensation present to light touch to level of digits, Bilateral.    Musculoskeletal: Muscle strength 5/5, Bilateral.  Pain present upon palpation of metatarsal heads, 2nd most severe, with hyperkeratosis, Bilateral. normal medial longitudinal arch, Bilateral.  Ankle ROM normal,Bilateral.  1st MPJ ROM abnormal - fused, Bilateral.  Dorsally contracted digits absent digits 5, Bilateral.     Integument: Warm, dry, supple, Bilateral.  Open lesion absent, Bilateral.      Radiographs: Three weightbearing views (AP, Oblique, and Lateral) of the right foot were obtained in the office today and reviewed, revealing no acute fracture, dislocation, or radioopaque foreign body/tumor. Overall alignment is satisfactory. Fusion of the first metatarsal phalangeal joint. Electronically signed by Franklin Tidwell DPM  Radiographs: Three weightbearing views (AP, Oblique, and Lateral) of the left foot were obtained in the office today and reviewed, revealing no acute fracture, dislocation, or radioopaque foreign body/tumor. Overall alignment is satisfactory. Fusion of the first metatarsal phalangeal joint. Electronically signed by Franklin Tidwell DPM      Asessment: Patient is a 71 y.o. female with:   1. Foot pain, right    2. Foot pain, left    3. Metatarsalgia, right foot    4. Metatarsalgia, left foot    5. History of foot surgery    6. Postural fatigue          Plan: Patient examined and evaluated. Current condition and treatment options discussed in detail. Advised pt to wear good shoes and sandals. .  Verbal and written instructions given to patient. Contact office with any questions/problems/concerns. Recommended new shoes, [place]oka type  Discussed orthotics. Powersteps    Orders Placed This Encounter   Procedures    XR FOOT LEFT (MIN 3 VIEWS)    XR FOOT RIGHT (MIN 3 VIEWS)       No orders of the defined types were placed in this encounter. RTC in as needed.     9/7/2022

## 2022-09-29 DIAGNOSIS — J30.1 CHRONIC SEASONAL ALLERGIC RHINITIS DUE TO POLLEN: ICD-10-CM

## 2022-09-29 RX ORDER — FLUTICASONE PROPIONATE 50 MCG
SPRAY, SUSPENSION (ML) NASAL
Qty: 1 EACH | Refills: 9 | Status: SHIPPED | OUTPATIENT
Start: 2022-09-29

## 2022-09-29 NOTE — TELEPHONE ENCOUNTER
LAST VISIT: 7/5/22  NEXT VISIT: 12/6/22    Per last dictation patient is using this medication. Please sign for refill if ok. Thank you.

## 2022-10-05 ENCOUNTER — TELEPHONE (OUTPATIENT)
Dept: PULMONOLOGY | Age: 72
End: 2022-10-05

## 2022-10-05 NOTE — TELEPHONE ENCOUNTER
Pt is scheduled to f/u with you in December. She has followed up with her PCP who prescribed prednisone and albuterol HFA. She also uses Flovent and Flonase. Pt has been coughing for the past 2 month. She is wondering if there is anything more she can do. Please advise.

## 2022-10-09 ENCOUNTER — HOSPITAL ENCOUNTER (EMERGENCY)
Age: 72
Discharge: HOME | End: 2022-10-09
Attending: EMERGENCY MEDICINE
Payer: MEDICARE

## 2022-10-09 ENCOUNTER — APPOINTMENT (OUTPATIENT)
Dept: GENERAL RADIOLOGY | Age: 72
End: 2022-10-09
Payer: MEDICARE

## 2022-10-09 VITALS
WEIGHT: 160 LBS | OXYGEN SATURATION: 94 % | HEIGHT: 61 IN | DIASTOLIC BLOOD PRESSURE: 78 MMHG | RESPIRATION RATE: 18 BRPM | TEMPERATURE: 97.2 F | HEART RATE: 78 BPM | SYSTOLIC BLOOD PRESSURE: 151 MMHG | BODY MASS INDEX: 30.21 KG/M2

## 2022-10-09 DIAGNOSIS — J18.9 PNEUMONIA OF LEFT LOWER LOBE DUE TO INFECTIOUS ORGANISM: Primary | ICD-10-CM

## 2022-10-09 LAB
ABSOLUTE EOS #: <0.03 K/UL (ref 0–0.44)
ABSOLUTE IMMATURE GRANULOCYTE: 0.04 K/UL (ref 0–0.3)
ABSOLUTE LYMPH #: 2.37 K/UL (ref 1.1–3.7)
ABSOLUTE MONO #: 0.6 K/UL (ref 0.1–1.2)
ANION GAP SERPL CALCULATED.3IONS-SCNC: 10 MMOL/L (ref 9–17)
BASOPHILS # BLD: 0 % (ref 0–2)
BASOPHILS ABSOLUTE: <0.03 K/UL (ref 0–0.2)
BUN BLDV-MCNC: 13 MG/DL (ref 8–23)
CALCIUM SERPL-MCNC: 9.1 MG/DL (ref 8.6–10.4)
CHLORIDE BLD-SCNC: 100 MMOL/L (ref 98–107)
CO2: 25 MMOL/L (ref 20–31)
CREAT SERPL-MCNC: 0.58 MG/DL (ref 0.5–0.9)
EOSINOPHILS RELATIVE PERCENT: 0 % (ref 1–4)
GFR SERPL CREATININE-BSD FRML MDRD: >60 ML/MIN/1.73M2
GLUCOSE BLD-MCNC: 148 MG/DL (ref 70–99)
HCT VFR BLD CALC: 39.3 % (ref 36.3–47.1)
HEMOGLOBIN: 13.6 G/DL (ref 11.9–15.1)
IMMATURE GRANULOCYTES: 1 %
LYMPHOCYTES # BLD: 35 % (ref 24–43)
MAGNESIUM: 2.3 MG/DL (ref 1.6–2.6)
MCH RBC QN AUTO: 29.5 PG (ref 25.2–33.5)
MCHC RBC AUTO-ENTMCNC: 34.6 G/DL (ref 28.4–34.8)
MCV RBC AUTO: 85.2 FL (ref 82.6–102.9)
MONOCYTES # BLD: 9 % (ref 3–12)
NRBC AUTOMATED: 0 PER 100 WBC
PDW BLD-RTO: 12.1 % (ref 11.8–14.4)
PLATELET # BLD: 236 K/UL (ref 138–453)
PMV BLD AUTO: 10.3 FL (ref 8.1–13.5)
POTASSIUM SERPL-SCNC: 3.3 MMOL/L (ref 3.7–5.3)
PRO-BNP: 491 PG/ML
RBC # BLD: 4.61 M/UL (ref 3.95–5.11)
SARS-COV-2, RAPID: NOT DETECTED
SEG NEUTROPHILS: 55 % (ref 36–65)
SEGMENTED NEUTROPHILS ABSOLUTE COUNT: 3.68 K/UL (ref 1.5–8.1)
SODIUM BLD-SCNC: 135 MMOL/L (ref 135–144)
SPECIMEN DESCRIPTION: NORMAL
TROPONIN, HIGH SENSITIVITY: 6 NG/L (ref 0–14)
WBC # BLD: 6.7 K/UL (ref 3.5–11.3)

## 2022-10-09 PROCEDURE — 83735 ASSAY OF MAGNESIUM: CPT

## 2022-10-09 PROCEDURE — 94640 AIRWAY INHALATION TREATMENT: CPT

## 2022-10-09 PROCEDURE — 96375 TX/PRO/DX INJ NEW DRUG ADDON: CPT

## 2022-10-09 PROCEDURE — 84484 ASSAY OF TROPONIN QUANT: CPT

## 2022-10-09 PROCEDURE — 93005 ELECTROCARDIOGRAM TRACING: CPT | Performed by: EMERGENCY MEDICINE

## 2022-10-09 PROCEDURE — 87635 SARS-COV-2 COVID-19 AMP PRB: CPT

## 2022-10-09 PROCEDURE — 96365 THER/PROPH/DIAG IV INF INIT: CPT

## 2022-10-09 PROCEDURE — 6360000002 HC RX W HCPCS: Performed by: STUDENT IN AN ORGANIZED HEALTH CARE EDUCATION/TRAINING PROGRAM

## 2022-10-09 PROCEDURE — 6370000000 HC RX 637 (ALT 250 FOR IP): Performed by: STUDENT IN AN ORGANIZED HEALTH CARE EDUCATION/TRAINING PROGRAM

## 2022-10-09 PROCEDURE — 80048 BASIC METABOLIC PNL TOTAL CA: CPT

## 2022-10-09 PROCEDURE — 71045 X-RAY EXAM CHEST 1 VIEW: CPT

## 2022-10-09 PROCEDURE — 83880 ASSAY OF NATRIURETIC PEPTIDE: CPT

## 2022-10-09 PROCEDURE — 85025 COMPLETE CBC W/AUTO DIFF WBC: CPT

## 2022-10-09 PROCEDURE — 99285 EMERGENCY DEPT VISIT HI MDM: CPT

## 2022-10-09 RX ORDER — GUAIFENESIN AND CODEINE PHOSPHATE 100; 10 MG/5ML; MG/5ML
10 SOLUTION ORAL NIGHTLY PRN
Qty: 50 ML | Refills: 0 | Status: SHIPPED | OUTPATIENT
Start: 2022-10-09 | End: 2022-10-14

## 2022-10-09 RX ORDER — IPRATROPIUM BROMIDE AND ALBUTEROL SULFATE 2.5; .5 MG/3ML; MG/3ML
1 SOLUTION RESPIRATORY (INHALATION) EVERY 4 HOURS PRN
Status: DISCONTINUED | OUTPATIENT
Start: 2022-10-09 | End: 2022-10-09 | Stop reason: HOSPADM

## 2022-10-09 RX ORDER — PREDNISONE 50 MG/1
50 TABLET ORAL DAILY
Qty: 5 TABLET | Refills: 0 | Status: SHIPPED | OUTPATIENT
Start: 2022-10-09 | End: 2022-10-14

## 2022-10-09 RX ORDER — GUAIFENESIN/DEXTROMETHORPHAN 100-10MG/5
5 SYRUP ORAL 3 TIMES DAILY PRN
Qty: 120 ML | Refills: 0 | Status: SHIPPED | OUTPATIENT
Start: 2022-10-09 | End: 2022-10-19

## 2022-10-09 RX ORDER — AMOXICILLIN AND CLAVULANATE POTASSIUM 875; 125 MG/1; MG/1
1 TABLET, FILM COATED ORAL 2 TIMES DAILY
Qty: 14 TABLET | Refills: 0 | Status: SHIPPED | OUTPATIENT
Start: 2022-10-09 | End: 2022-10-15 | Stop reason: SDUPTHER

## 2022-10-09 RX ORDER — KETOROLAC TROMETHAMINE 15 MG/ML
15 INJECTION, SOLUTION INTRAMUSCULAR; INTRAVENOUS ONCE
Status: COMPLETED | OUTPATIENT
Start: 2022-10-09 | End: 2022-10-09

## 2022-10-09 RX ORDER — METHYLPREDNISOLONE SODIUM SUCCINATE 125 MG/2ML
125 INJECTION, POWDER, LYOPHILIZED, FOR SOLUTION INTRAMUSCULAR; INTRAVENOUS ONCE
Status: COMPLETED | OUTPATIENT
Start: 2022-10-09 | End: 2022-10-09

## 2022-10-09 RX ORDER — DOXYCYCLINE HYCLATE 100 MG
100 TABLET ORAL ONCE
Status: COMPLETED | OUTPATIENT
Start: 2022-10-09 | End: 2022-10-09

## 2022-10-09 RX ORDER — POTASSIUM CHLORIDE 20 MEQ/1
40 TABLET, EXTENDED RELEASE ORAL ONCE
Status: COMPLETED | OUTPATIENT
Start: 2022-10-09 | End: 2022-10-09

## 2022-10-09 RX ORDER — AMOXICILLIN AND CLAVULANATE POTASSIUM 875; 125 MG/1; MG/1
1 TABLET, FILM COATED ORAL ONCE
Status: COMPLETED | OUTPATIENT
Start: 2022-10-09 | End: 2022-10-09

## 2022-10-09 RX ORDER — MAGNESIUM SULFATE IN WATER 40 MG/ML
2000 INJECTION, SOLUTION INTRAVENOUS ONCE
Status: COMPLETED | OUTPATIENT
Start: 2022-10-09 | End: 2022-10-09

## 2022-10-09 RX ORDER — DOXYCYCLINE HYCLATE 100 MG
100 TABLET ORAL 2 TIMES DAILY
Qty: 14 TABLET | Refills: 0 | Status: SHIPPED | OUTPATIENT
Start: 2022-10-09 | End: 2022-10-11 | Stop reason: ALTCHOICE

## 2022-10-09 RX ORDER — GUAIFENESIN DEXTROMETHORPHAN HYDROBROMIDE ORAL SOLUTION 10; 100 MG/5ML; MG/5ML
15 SOLUTION ORAL ONCE
Status: COMPLETED | OUTPATIENT
Start: 2022-10-09 | End: 2022-10-09

## 2022-10-09 RX ORDER — GUAIFENESIN DEXTROMETHORPHAN HYDROBROMIDE ORAL SOLUTION 10; 100 MG/5ML; MG/5ML
10 SOLUTION ORAL ONCE
Status: DISCONTINUED | OUTPATIENT
Start: 2022-10-09 | End: 2022-10-09 | Stop reason: HOSPADM

## 2022-10-09 RX ADMIN — MAGNESIUM SULFATE HEPTAHYDRATE 2000 MG: 40 INJECTION, SOLUTION INTRAVENOUS at 04:00

## 2022-10-09 RX ADMIN — AMOXICILLIN AND CLAVULANATE POTASSIUM 1 TABLET: 875; 125 TABLET, FILM COATED ORAL at 04:59

## 2022-10-09 RX ADMIN — IPRATROPIUM BROMIDE AND ALBUTEROL SULFATE 1 AMPULE: .5; 2.5 SOLUTION RESPIRATORY (INHALATION) at 05:27

## 2022-10-09 RX ADMIN — METHYLPREDNISOLONE SODIUM SUCCINATE 125 MG: 125 INJECTION, POWDER, FOR SOLUTION INTRAMUSCULAR; INTRAVENOUS at 03:32

## 2022-10-09 RX ADMIN — DOXYCYCLINE HYCLATE 100 MG: 100 TABLET, COATED ORAL at 04:59

## 2022-10-09 RX ADMIN — POTASSIUM CHLORIDE 40 MEQ: 1500 TABLET, EXTENDED RELEASE ORAL at 04:59

## 2022-10-09 RX ADMIN — IPRATROPIUM BROMIDE AND ALBUTEROL SULFATE 1 AMPULE: .5; 2.5 SOLUTION RESPIRATORY (INHALATION) at 03:20

## 2022-10-09 RX ADMIN — KETOROLAC TROMETHAMINE 15 MG: 15 INJECTION, SOLUTION INTRAMUSCULAR; INTRAVENOUS at 04:59

## 2022-10-09 RX ADMIN — DEXTROMETHORPHAN HYDROBROMIDE, GUAIFENESIN 15 ML: 10; 100 LIQUID ORAL at 03:31

## 2022-10-09 ASSESSMENT — PAIN - FUNCTIONAL ASSESSMENT: PAIN_FUNCTIONAL_ASSESSMENT: 0-10

## 2022-10-09 ASSESSMENT — PAIN SCALES - GENERAL: PAINLEVEL_OUTOF10: 5

## 2022-10-09 ASSESSMENT — PAIN DESCRIPTION - LOCATION: LOCATION: FLANK

## 2022-10-09 NOTE — DISCHARGE INSTRUCTIONS
Your symptoms, we check several labs including her blood sugar, blood count, electrolytes, kidney function. All of these were normal except for your potassium which was slightly low. Your labs to look at your heart including your troponin and your BNP which look for heart attack and heart failure were normal.  Your EKG showed no signs of a heart attack. Your chest x-ray did show a pneumonia in your left lower lobe. This pneumonia is likely causing the severe respiratory symptoms. The pneumonia can flareup your COPD. For this, we treated you with breathing treatments, steroids and antibiotics in the emergency department    To treat yourself at home    Take Augmentin twice daily for the next 7 days. This is an antibiotic that will help clear up the infection. Please take all 7 days of this medication but infection can get worse  Also take doxycycline twice daily for the next 7 days. This is also an antibiotic. You will need to take both antibiotics for this infection, given that you are on a recent antibiotic that was not sufficient to treat your infection    I have also increased your dose of steroids. Please take prednisone 50 mg daily for the next 5 days    For cough, during the day take the Robitussin DM 3 times a day as needed. This does not have the codeine in it and will not make you sleepy  At night, take the guaifenesin with codeine. This is a cough syrup with some pain medication. Both the codeine and the cough medicine will help with the cough and should also help you sleep. Do not over take this medication.     Please continue to use your inhalers every 6 hours as needed    You develop any worsening of her symptoms, severe pain, chest pain, trouble breathing, passing out, fevers or vomiting or other worrisome symptoms, please return to the emergency department immediately    Call your primary care doctor and your pulmonologist to schedule an appointment as soon as possible for recheck

## 2022-10-09 NOTE — ED TRIAGE NOTES
PT presents to ED following seeing doctor on Tuesday for asthma flare up and was started on prednisone. Pt presents today with could and rt side pain with coughing.  PT states she is unable to lay down due to the coughing

## 2022-10-09 NOTE — ED PROVIDER NOTES
[facility]     Emergency Department     Faculty Attestation    I performed a history and physical examination of the patient and discussed management with the resident. I have reviewed and agree with the residents findings including all diagnostic interpretations, and treatment plans as written. Any areas of disagreement are noted on the chart. I was personally present for the key portions of any procedures. I have documented in the chart those procedures where I was not present during the key portions. I have reviewed the emergency nurses triage note. I agree with the chief complaint, past medical history, past surgical history, allergies, medications, social and family history as documented unless otherwise noted below. Documentation of the HPI, Physical Exam and Medical Decision Making performed by macarioibrosalio is based on my personal performance of the HPI, PE and MDM. For Physician Assistant/ Nurse Practitioner cases/documentation I have personally evaluated this patient and have completed at least one if not all key elements of the E/M (history, physical exam, and MDM). Additional findings are as noted. 72 yo F c/o sob, cough, no fever, pain on R side chest,   Pe vss gcs 15 coarse wheeze R >L   No calf swelling, no calf tenderness,     Basilar infiltrate, trop stable, wbc stable, s/s improved, pt not wishing to be admitted to obs for stronger abx,   Pt aware of risk, pt appears to have decision making capacity,     EKG Interpretation    Interpreted by me  Normal sinus rhythm, PVC, heart rate 80, no ischemia, left axis, QT corrected 500,rbbb    CRITICAL CARE: There was a high probability of clinically significant/life threatening deterioration in this patient's condition which required my urgent intervention. Total critical care time was 10 minutes. This excludes any time for separately reportable procedures.        Jesse Cabrera8 [address] [facility], DO  10/09/22 0310       [facility], DO  10/09/22 [address] [facility], DO  10/09/22 0627

## 2022-10-10 LAB
EKG ATRIAL RATE: 80 BPM
EKG P AXIS: 37 DEGREES
EKG P-R INTERVAL: 148 MS
EKG Q-T INTERVAL: 434 MS
EKG QRS DURATION: 132 MS
EKG QTC CALCULATION (BAZETT): 500 MS
EKG R AXIS: -18 DEGREES
EKG T AXIS: -24 DEGREES
EKG VENTRICULAR RATE: 80 BPM

## 2022-10-10 PROCEDURE — 93010 ELECTROCARDIOGRAM REPORT: CPT | Performed by: INTERNAL MEDICINE

## 2022-10-11 ENCOUNTER — OFFICE VISIT (OUTPATIENT)
Dept: PULMONOLOGY | Age: 72
End: 2022-10-11
Payer: MEDICARE

## 2022-10-11 VITALS
BODY MASS INDEX: 30.21 KG/M2 | DIASTOLIC BLOOD PRESSURE: 74 MMHG | HEIGHT: 61 IN | HEART RATE: 82 BPM | OXYGEN SATURATION: 98 % | WEIGHT: 160 LBS | SYSTOLIC BLOOD PRESSURE: 122 MMHG | RESPIRATION RATE: 20 BRPM

## 2022-10-11 DIAGNOSIS — Z86.16 HISTORY OF COVID-19: ICD-10-CM

## 2022-10-11 DIAGNOSIS — J30.1 CHRONIC SEASONAL ALLERGIC RHINITIS DUE TO POLLEN: ICD-10-CM

## 2022-10-11 DIAGNOSIS — K21.9 GASTROESOPHAGEAL REFLUX DISEASE, UNSPECIFIED WHETHER ESOPHAGITIS PRESENT: ICD-10-CM

## 2022-10-11 DIAGNOSIS — J45.40 MODERATE PERSISTENT ASTHMA WITHOUT COMPLICATION: Primary | ICD-10-CM

## 2022-10-11 DIAGNOSIS — R09.82 POST-NASAL DRIP: ICD-10-CM

## 2022-10-11 DIAGNOSIS — E66.09 OBESITY DUE TO EXCESS CALORIES, UNSPECIFIED OBESITY SEVERITY: ICD-10-CM

## 2022-10-11 PROCEDURE — G8484 FLU IMMUNIZE NO ADMIN: HCPCS | Performed by: INTERNAL MEDICINE

## 2022-10-11 PROCEDURE — G8427 DOCREV CUR MEDS BY ELIG CLIN: HCPCS | Performed by: INTERNAL MEDICINE

## 2022-10-11 PROCEDURE — 3017F COLORECTAL CA SCREEN DOC REV: CPT | Performed by: INTERNAL MEDICINE

## 2022-10-11 PROCEDURE — G8417 CALC BMI ABV UP PARAM F/U: HCPCS | Performed by: INTERNAL MEDICINE

## 2022-10-11 PROCEDURE — 1090F PRES/ABSN URINE INCON ASSESS: CPT | Performed by: INTERNAL MEDICINE

## 2022-10-11 PROCEDURE — G8400 PT W/DXA NO RESULTS DOC: HCPCS | Performed by: INTERNAL MEDICINE

## 2022-10-11 PROCEDURE — 1123F ACP DISCUSS/DSCN MKR DOCD: CPT | Performed by: INTERNAL MEDICINE

## 2022-10-11 PROCEDURE — 99214 OFFICE O/P EST MOD 30 MIN: CPT | Performed by: INTERNAL MEDICINE

## 2022-10-11 PROCEDURE — 1036F TOBACCO NON-USER: CPT | Performed by: INTERNAL MEDICINE

## 2022-10-11 RX ORDER — PREDNISONE 10 MG/1
TABLET ORAL
Qty: 30 TABLET | Refills: 1 | Status: SHIPPED | OUTPATIENT
Start: 2022-10-11

## 2022-10-11 RX ORDER — MONTELUKAST SODIUM 10 MG/1
10 TABLET ORAL NIGHTLY
Qty: 30 TABLET | Refills: 5 | Status: SHIPPED | OUTPATIENT
Start: 2022-10-11 | End: 2022-11-10

## 2022-10-11 ASSESSMENT — ENCOUNTER SYMPTOMS
NAUSEA: 0
ABDOMINAL PAIN: 0
CONSTIPATION: 0
COUGH: 1
BACK PAIN: 0
VOMITING: 0
SORE THROAT: 0
RHINORRHEA: 0
SHORTNESS OF BREATH: 1
DIARRHEA: 0

## 2022-10-11 NOTE — PROGRESS NOTES
PULMONARY OP  PROGRESS NOTE      Patient:  Solomon Zambrano  YOB: 1950    MRN: [id number]     Acct:        Pt seen and Chart reviewed. Ms. Solomon Zambrano is here in followup for   1. Moderate persistent asthma without complication    2. Post-nasal drip    3. Gastroesophageal reflux disease, unspecified whether esophagitis present    4. Obesity due to excess calories, unspecified obesity severity    5. Chronic seasonal allergic rhinitis due to pollen    6. History of COVID-19          Pt has not been hospitalized but has been to the urgent care and emergency room multiple times in the past several months  Had covid 19 infection in January 2022  Has been using meds as recommended. Patient currently is on prednisone and Flovent  Patient has dry cough  After being evaluated in the emergency room recently, patient has been on 2 different antibiotics including Augmentin and doxycycline  Has shortness of breath and wheezing that is slightly better since being in the emergency room  Uses albuterol at least once a day  No chest pain or pressure. No acid reflux symptoms. Has no post nasal discharge and she is using Flonase. She has been working on losing weight.   A thorough history was obtained to see if there is any patient or environmental factors that could be contributing to her recurrent asthma attacks and ultimately her  pointed out that the patient uses bleach containing sprays to help keep her home clean, since having had the COVID-19 infection    Subjective:  ROS    Review of Systems -   General ROS: Completed and except as mentioned above were negative   Psychological ROS:  Completed and except as mentioned above were negative  Ophthalmic ROS:  Completed and except as mentioned above were negative  ENT ROS:  Completed and except as mentioned above were negative  Allergy and Immunology ROS:  Completed and except as mentioned above were negative  Hematological and Lymphatic ROS:  Completed and except as mentioned above were negative  Endocrine ROS: Completed and except as mentioned above were negative  Respiratory ROS:  Completed and except as mentioned above were negative  Cardiovascular ROS:  Completed and except as mentioned above were negative  Gastrointestinal ROS: Completed and except as mentioned above were negative  Genito-Urinary ROS:  Completed and except as mentioned above were negative  Musculoskeletal ROS:  Completed and except as mentioned above were negative  Neurological ROS:  Completed and except as mentioned above were negative  Dermatological ROS:  Completed and except as mentioned above were negative          Allergies:  No Known Allergies    Medications:    Current Outpatient Medications:     doxycycline hyclate (VIBRA-TABS) 100 MG tablet, Take 1 tablet by mouth 2 times daily for 7 days, Disp: 14 tablet, Rfl: 0    amoxicillin-clavulanate (AUGMENTIN) 875-125 MG per tablet, Take 1 tablet by mouth 2 times daily for 7 days, Disp: 14 tablet, Rfl: 0    predniSONE (DELTASONE) 50 MG tablet, Take 1 tablet by mouth daily for 5 days, Disp: 5 tablet, Rfl: 0    guaiFENesin-codeine (TUSSI-ORGANIDIN NR) 100-10 MG/5ML syrup, Take 10 mLs by mouth nightly as needed for Cough for up to 5 days. , Disp: 50 mL, Rfl: 0    guaiFENesin-dextromethorphan (ROBITUSSIN DM) 100-10 MG/5ML syrup, Take 5 mLs by mouth 3 times daily as needed for Cough, Disp: 120 mL, Rfl: 0    albuterol sulfate HFA (PROVENTIL;VENTOLIN;PROAIR) 108 (90 Base) MCG/ACT inhaler, Inhale 2 puffs into the lungs every 6 hours as needed for Wheezing or Shortness of Breath, Disp: 18 g, Rfl: 1    fluticasone (FLONASE) 50 MCG/ACT nasal spray, SPRAY 2 SPRAYS BY NASAL ROUTE EVERY DAY, Disp: 1 each, Rfl: 9    metFORMIN (GLUCOPHAGE-XR) 500 MG extended release tablet, TAKE 1 TABLET BY MOUTH EVERY DAY WITH BREAKFAST, Disp: 90 tablet, Rfl: 0    FLOVENT  MCG/ACT inhaler, TAKE 1 PUFF BY MOUTH TWICE A DAY, Disp: 12 each, Rfl: 3    atorvastatin (LIPITOR) 10 MG tablet, TAKE 1 TABLET BY MOUTH EVERY DAY, Disp: 90 tablet, Rfl: 0    Handicap Placard MISC, by Does not apply route Expires 6/23/26., Disp: 1 each, Rfl: 0    omeprazole (PRILOSEC) 20 MG delayed release capsule, TAKE 1 CAPSULE BY MOUTH EVERY DAY, Disp: 90 capsule, Rfl: 1    balsalazide (COLAZAL) 750 MG capsule, Take 2 capsules by mouth 2 times daily, Disp: 120 capsule, Rfl: 11    mesalamine (LIALDA) 1.2 g EC tablet, Take 1 tablet by mouth daily (with breakfast), Disp: 30 tablet, Rfl: 3    blood glucose test strips (TRUE METRIX BLOOD GLUCOSE TEST) strip, TEST 1 TIME DAILY FOR SYMPTOMS OF IRREGULAR BLOOD GLUCOSE., Disp: 100 strip, Rfl: 3    metoprolol succinate (TOPROL XL) 50 MG extended release tablet, TAKE 1 TABLET BY MOUTH EVERY DAY, Disp: , Rfl:     TRUEPLUS LANCETS 30G MISC, Test 1 times a day for symptoms of irregular blood glucose., Disp: 100 each, Rfl: 3    albuterol sulfate HFA (PROAIR HFA) 108 (90 Base) MCG/ACT inhaler, Inhale 2 puffs into the lungs every 6 hours as needed for Wheezing, Disp: 1 Inhaler, Rfl: 3    meclizine (ANTIVERT) 25 MG tablet, TAKE 1 TABLET BY MOUTH THREE TIMES A DAY AS NEEDED FOR NAUSEA or dizziness , Disp: 60 tablet, Rfl: 1    promethazine (PHENERGAN) 25 MG tablet, TAKE 1/2 TABLET BY MOUTH EVERY SIX HOURS AS NEEDED FOR NAUSEA, Disp: 30 tablet, Rfl: 0    ondansetron (ZOFRAN-ODT) 4 MG disintegrating tablet, DISSOLVE 1 TABLET IN MOUTH EVERY EIGHT HOURS AS NEEDED FOR NAUSEA AND VOMITING, Disp: 30 tablet, Rfl: 0    Blood Glucose Monitoring Suppl SU, Inject 1 each into the skin daily and prn for diabetes (Patient taking differently: Inject 1 each into the skin daily for diabetes), Disp: 1 Device, Rfl: 0    amLODIPine (NORVASC) 2.5 MG tablet, Take 1 tablet by mouth daily, Disp: 30 tablet, Rfl: 3    Loratadine (CLARITIN PO), Take by mouth daily , Disp: , Rfl:     Respiratory Therapy Supplies (SPIROMETER) KIT, Take 1 inhalation by mouth 6 times daily, Disp: 1 kit, Rfl: 1      Objective:    Physical Exam:  Vitals: /74   Pulse 82   Resp 20   Ht 5' 1\" (1.549 m)   Wt 160 lb (72.6 kg)   SpO2 98% Comment: room air  BMI 30.23 kg/m²   Last 3 weights: Wt Readings from Last 3 Encounters:   10/11/22 160 lb (72.6 kg)   10/09/22 160 lb (72.6 kg)   09/07/22 160 lb (72.6 kg)     Body mass index is 30.23 kg/m². Physical Examination:   PHYSICAL EXAMINATION:  Vitals:    10/11/22 1520   BP: 122/74   Pulse: 82   Resp: 20   SpO2: 98%   Weight: 160 lb (72.6 kg)   Height: 5' 1\" (1.549 m)       Physical Exam    Constitutional: This is a well developed, well nourish. Her BMI was, 30-34.9 - Obesity Grade I 71y.o. year old female who is alert, oriented, cooperative and in no apparent distress. Head:normocephalic and atraumatic. EENT:   ADAM. No conjunctival injections. Septum was midline, mucosa was without erythema, exudates or cobblestoning. No thrush was noted. Mallampati II (soft palate, uvula, fauces visible)  Neck: Supple without thyromegaly. No elevated JVP. Trachea was midline. Respiratory: Chest was symmetrical without dullness to percussion. Breath sounds bilaterally were clear to auscultation. There were bilateral rhonchi and bibasilar rales. There is no intercostal retraction or use of accessory muscles. No egophony noted. Cardiovascular: Regular without murmur, clicks, gallops or rubs. Abdomen: Slightly rounded and soft without organomegaly. No rebound tenderness, rigidity or guarding was appreciated. Lymphatic: No lymphadenopathy. Musculoskeletal: Normal curvature of the spine. No gross muscle weakness. Extremities:  No lower extremity edema, ulcerations, tenderness, varicosities or erythema. Muscle size, tone and strength are normal.  No involuntary movements are noted. Skin:  Warm and dry. Good color, turgor and pigmentation. No lesions or scars.   Neurological/Psychiatric: The patient's general behavior, level of consciousness, thought content and emotional status is normal.       Labs:    Chest x-ray was done at [address]  in September 2021 that showed bibasilar subsegmental linear atelectasis    Chest x-ray was also done in November 2021 that showed left basilar discoid atelectasis and enlarged cardiac silhouette and a repeat x-ray on the same day showed the same findings    CXR in feb 2022 showed-  Mild scarring in the left lung base. Examination is otherwise unremarkable. Chest x-ray on 10/9/2022 showed increased left basilar airspace disease suspicious for pneumonia    COVID-19 test on 10/9/2022 was negative    Assessment:    1. Moderate persistent asthma without complication    2. Post-nasal drip    3. Gastroesophageal reflux disease, unspecified whether esophagitis present    4. Obesity due to excess calories, unspecified obesity severity    5. Chronic seasonal allergic rhinitis due to pollen    6. History of COVID-19    It is possible that the patient's recurring asthma attacks could have been precipitated by her use of bleach containing sprays in the house to keep the house clean, since having had the COVID-19 infection        PLAN:    Recommended patient to be mindful that the chemical sprays could be causing her acute asthma attacks  Reviewed the chest x-ray with the patient and also the laboratory test done in the emergency room which showed elevated BNP  Ordered CT scan of the sinuses  Completed Asthma education in the past  Refills were provided. -Trelegy, Singulair, prednisone taper  Will discontinue doxycycline as the patient is on Augmentin  Recommended keeping the Flovent so that we can use it if needed in the future  Educated the patient on the rationale for increasing the medications and how in the future we will de-escalate  Patient was recommended to have prednisone and an antibiotic available for use during an exacerbation  Educated and clarified the medication use.  Discussed use, benefit, and side effects of prescribed medications. Barriers to medication compliance addressed. Bennie Gonzales received counseling on the following healthy behaviors: nutrition, exercise and medication adherence  Recommend flu vaccination in the fall annually. Recommendations given regarding pneumococcal vaccinations. Had the COVID-19 vaccination  Recommended COVID-19 vaccination booster  Patient is up-to-date with vaccinations from pulmonary perspective. Maintain an active lifestyle. Patient was educated on how to use the respiratory medications. All the questions that the patient  has had were answered to her satisfaction  Home O2 evaluation to be done. Supplemental oxygen was not needed. After reviewing the patient's smoking history and her age patient does not meet the criteria for lung cancer screening as the pt never smoked. We'll see the patient back in 3 months or earlier if needed  She will call us if she is sick and need to be seen sooner  Thank you for having us involved in the care of your patient. Please call us if you have any questions or concerns.         Adenike Sanchez MD, MD             10/11/2022, 3:27 PM

## 2022-10-11 NOTE — ED PROVIDER NOTES
[facility]  ED  Emergency Department Encounter  Emergency Medicine Resident     Pt Name: Maru Padilla  MRN: [id number]  [place] 1950  Date of evaluation: 10/11/22  PCP:  Charles Saha MD    CHIEF COMPLAINT       Chief Complaint   Patient presents with    Asthma    Cough       HISTORY OFPRESENT ILLNESS  (Location/Symptom, Timing/Onset, Context/Setting, Quality, Duration, Modifying Factors,Severity.)      Maru Padilla is a 71 y.o. female with past medical history of diabetes, hypertension, hyperlipidemia, asthma who presents with shortness of breath, cough and right-sided chest pain that has been ongoing for the past several weeks. Patient reports symptoms started as congestion and rhinorrhea and progressed to shortness of breath. She is symptoms have been ongoing for the past couple of weeks. She did see her primary care provider in a virtual visit who diagnosed her with an asthma flare. She was started on prednisone and azithromycin. Patient states symptoms have not improved and are worsening. She now has pain to the right side of her chest which she attributes to coughing. She also reports that she is unable to sleep because every time she tries to lay back she will start coughing. She denies fever, chills, chest pain, abdominal pain, nausea, vomiting, diarrhea, urinary symptoms, leg swelling. Patient denies prior history of DVT/PE, cancer, smoking or exogenous hormone use. Patient did travel to [facility] a few months ago but no more recent travel. PAST MEDICAL / SURGICAL / SOCIAL / FAMILY HISTORY      has a past medical history of Anginal pain ([address] Bronchitis, Colitis, ulcerative ([address] Diabetes mellitus ([address] GERD (gastroesophageal reflux disease), Hypercholesterolemia, Hyperlipidemia, Hypertension, essential, benign, Meniere disease, Obesity (BMI 30.0-34.9), Ulcerative pancolitis without complication ([address] and Vitamin D deficiency.      has a past surgical history that includes Foot surgery (2011); Rotator cuff repair (2011); Colonoscopy (8/12/2011); Colonoscopy (12/15/2015); and Colonoscopy (N/A, 7/2/2019). Social:  reports that she has never smoked. She has never used smokeless tobacco. She reports that she does not drink alcohol and does not use drugs. Family Hx:   Family History   Problem Relation Age of Onset    Heart Disease Mother     Diabetes Mother     Cancer Mother         brain tumor    Heart Disease Father     Diabetes Father     Diabetes Sister     Thyroid Disease Sister     Diabetes Brother     Diabetes Brother     Thyroid Disease Brother     Ulcerative Colitis Grandchild         Allergies:  Patient has no known allergies. Home Medications:  Prior to Admission medications    Medication Sig Start Date End Date Taking? Authorizing Provider   doxycycline hyclate (VIBRA-TABS) 100 MG tablet Take 1 tablet by mouth 2 times daily for 7 days 10/9/22 10/16/22 Yes Antione Copeland DO   amoxicillin-clavulanate (AUGMENTIN) 875-125 MG per tablet Take 1 tablet by mouth 2 times daily for 7 days 10/9/22 10/16/22 Yes Antione Copeland DO   predniSONE (DELTASONE) 50 MG tablet Take 1 tablet by mouth daily for 5 days 10/9/22 10/14/22 Yes Yumiko Guzman DO   guaiFENesin-codeine (TUSSI-ORGANIDIN NR) 100-10 MG/5ML syrup Take 10 mLs by mouth nightly as needed for Cough for up to 5 days.  10/9/22 10/14/22 Yes Antione Copeland DO   guaiFENesin-dextromethorphan (ROBITUSSIN DM) 100-10 MG/5ML syrup Take 5 mLs by mouth 3 times daily as needed for Cough 10/9/22 10/19/22 Yes Freddie Guzman DO   albuterol sulfate HFA (PROVENTIL;VENTOLIN;PROAIR) 108 (90 Base) MCG/ACT inhaler Inhale 2 puffs into the lungs every 6 hours as needed for Wheezing or Shortness of Breath 10/4/22   Sachin Rodríguez MD   fluticasone (FLONASE) 50 MCG/ACT nasal spray SPRAY 2 SPRAYS BY NASAL ROUTE EVERY DAY 9/29/22   Caron Primrose, MD   metFORMIN (GLUCOPHAGE-XR) 500 MG extended release tablet TAKE 1 TABLET BY MOUTH EVERY DAY WITH BREAKFAST 9/14/22   Taz Wilson MD   [facility]  MCG/ACT inhaler TAKE 1 PUFF BY MOUTH TWICE A DAY 9/14/22   Taz Wilson MD   atorvastatin (LIPITOR) 10 MG tablet TAKE 1 TABLET BY MOUTH EVERY DAY 9/12/22   Kristi García MD   Respiratory Therapy Supplies (SPIROMETER) KIT Take 1 inhalation by mouth 6 times daily 8/26/22 9/25/22  VIBHA Allan NP   Handicap Placard MISC by Does not apply route Expires 6/23/26. 6/23/22   Kristi García MD   omeprazole (PRILOSEC) 20 MG delayed release capsule TAKE 1 CAPSULE BY MOUTH EVERY DAY 5/12/22   Kristi García MD   balsalazide (COLAZAL) 750 MG capsule Take 2 capsules by mouth 2 times daily 2/28/22   Nela Myles MD   mesalamine (LIALDA) 1.2 g EC tablet Take 1 tablet by mouth daily (with breakfast) 2/17/22   Kristi García MD   blood glucose test strips (TRUE METRIX BLOOD GLUCOSE TEST) strip TEST 1 TIME DAILY FOR SYMPTOMS OF IRREGULAR BLOOD GLUCOSE. 12/5/21   Kristi García MD   metoprolol succinate (TOPROL XL) 50 MG extended release tablet TAKE 1 TABLET BY MOUTH EVERY DAY 3/12/20   Marcela Ellis MD   TRUEPLUS LANCETS 30G MISC Test 1 times a day for symptoms of irregular blood glucose.  1/31/19   Kristi García MD   albuterol sulfate HFA (PROAIR HFA) 108 (90 Base) MCG/ACT inhaler Inhale 2 puffs into the lungs every 6 hours as needed for Wheezing 9/11/18   Hien Casiano MD   meclizine (ANTIVERT) 25 MG tablet TAKE 1 TABLET BY MOUTH THREE TIMES A DAY AS NEEDED FOR NAUSEA or dizziness  9/10/18   Kristi García MD   promethazine (PHENERGAN) 25 MG tablet TAKE 1/2 TABLET BY MOUTH EVERY SIX HOURS AS NEEDED FOR NAUSEA 8/27/18   Kristi García MD   ondansetron (ZOFRAN-ODT) 4 MG disintegrating tablet DISSOLVE 1 TABLET IN MOUTH EVERY EIGHT HOURS AS NEEDED FOR NAUSEA AND VOMITING 8/20/18   Irl Muse, APRN - CNP   Blood Glucose Monitoring Suppl SU Inject 1 each into the skin daily and prn for diabetes  Patient taking differently: Inject 1 each into the skin daily for diabetes 6/25/18   VIBHA Castellon CNP   amLODIPine (NORVASC) 2.5 MG tablet Take 1 tablet by mouth daily 12/4/17   VIBHA Castellon CNP   Loratadine (CLARITIN PO) Take by mouth daily     Historical Provider, MD       REVIEW OFSYSTEMS    (2-9 systems for level 4, 10 or more for level 5)      Review of Systems   Constitutional:  Negative for chills and fever. HENT:  Negative for congestion, rhinorrhea and sore throat. Eyes:  Negative for visual disturbance. Respiratory:  Positive for cough and shortness of breath. Cardiovascular:  Negative for chest pain and leg swelling. Gastrointestinal:  Negative for abdominal pain, constipation, diarrhea, nausea and vomiting. Genitourinary:  Negative for dysuria, frequency and hematuria. Musculoskeletal:  Positive for arthralgias (Rib pain). Negative for back pain and neck pain. Skin:  Negative for rash. Neurological:  Negative for weakness, light-headedness, numbness and headaches. Psychiatric/Behavioral:  Negative for confusion. All other systems reviewed and are negative. PHYSICAL EXAM   (up to 7 for level 4, 8 or more forlevel 5)      INITIAL VITALS:   Vitals:    10/09/22 0302 10/09/22 0303 10/09/22 0314 10/09/22 0357   BP: (!) 151/78      Pulse:       Resp:       Temp:       TempSrc:       SpO2: 97% 97% 93% 94%   Weight:       Height:            Physical Exam  Vitals and nursing note reviewed. Constitutional:       General: She is not in acute distress. Appearance: She is not toxic-appearing. Comments: Adult female sitting in stretcher awake and alert and in no acute distress. She does appear uncomfortable   HENT:      Head: Normocephalic and atraumatic. Nose: Nose normal.      Mouth/Throat:      Mouth: Mucous membranes are moist.      Pharynx: Oropharynx is clear.    Eyes:      Conjunctiva/sclera: Conjunctivae normal.      Pupils: Pupils are equal, round, and reactive to light. Cardiovascular:      Rate and Rhythm: Normal rate and regular rhythm. Pulses: Normal pulses. Heart sounds: No murmur heard. No friction rub. No gallop. Pulmonary:      Effort: Pulmonary effort is normal. No respiratory distress. Breath sounds: Rales present. No wheezing or rhonchi. Comments: Coarse crackles throughout bilateral mid and lower lobes  Abdominal:      General: There is no distension. Palpations: Abdomen is soft. Tenderness: There is no abdominal tenderness. Musculoskeletal:      Cervical back: Neck supple. No rigidity. Right lower leg: No edema. Left lower leg: No edema. Skin:     General: Skin is warm and dry. Capillary Refill: Capillary refill takes less than 2 seconds. Findings: No rash. Neurological:      General: No focal deficit present. Mental Status: She is alert. Psychiatric:         Mood and Affect: Mood normal.         Behavior: Behavior normal.       DIFFERENTIAL  DIAGNOSIS     DDX: Pneumonia, asthma exacerbation, ACS, MI, CHF, viral illness, COVID, pulmonary embolism    Initial MDM/Plan: 71 y.o. female with past medical history of diabetes, hypertension, hyperlipidemia, asthma who presents with shortness of breath, cough and right-sided chest pain that has been ongoing for the past several weeks. Has seen her primary care doctor and was on prednisone, as well as azithromycin with no improvement. On physical exam, patient is nontoxic-appearing with unremarkable vital signs, though O2 sats are ranging from 94-95%. She has bilateral coarse crackles throughout the posterior mid and lower lobes. No obvious wheezing. Concern for viral illness versus asthma exacerbation versus pneumonia. PE considered less likely as patient is not tachycardic, has no leg swelling and no significant risk factors. Will obtain cardiac work-up including labs, EKG and chest x-ray to evaluate. Will treat symptomatically with steroids and breathing treatments. DIAGNOSTIC RESULTS / EMERGENCYDEPARTMENT COURSE / MDM     LABS:  Results for orders placed or performed during the hospital encounter of 10/09/22   COVID-19, Rapid    Specimen: Nasopharyngeal Swab   Result Value Ref Range    Specimen Description . NASOPHARYNGEAL SWAB     SARS-CoV-2, Rapid Not Detected Not Detected   Troponin   Result Value Ref Range    Troponin, High Sensitivity 6 0 - 14 ng/L   BMP   Result Value Ref Range    Glucose 148 (H) 70 - 99 mg/dL    BUN 13 8 - 23 mg/dL    Creatinine 0.58 0.50 - 0.90 mg/dL    Est, Glom Filt Rate >60 >60 mL/min/1.73m2    Calcium 9.1 8.6 - 10.4 mg/dL    Sodium 135 135 - 144 mmol/L    Potassium 3.3 (L) 3.7 - 5.3 mmol/L    Chloride 100 98 - 107 mmol/L    CO2 25 20 - 31 mmol/L    Anion Gap 10 9 - 17 mmol/L   CBC with Auto Differential   Result Value Ref Range    WBC 6.7 3.5 - 11.3 k/uL    RBC 4.61 3.95 - 5.11 m/uL    Hemoglobin 13.6 11.9 - 15.1 g/dL    Hematocrit 39.3 36.3 - 47.1 %    MCV 85.2 82.6 - 102.9 fL    MCH 29.5 25.2 - 33.5 pg    MCHC 34.6 28.4 - 34.8 g/dL    RDW 12.1 11.8 - 14.4 %    Platelets 981 936 - 643 k/uL    MPV 10.3 8.1 - 13.5 fL    NRBC Automated 0.0 0.0 per 100 WBC    Seg Neutrophils 55 36 - 65 %    Lymphocytes 35 24 - 43 %    Monocytes 9 3 - 12 %    Eosinophils % 0 (L) 1 - 4 %    Basophils 0 0 - 2 %    Immature Granulocytes 1 (H) 0 %    Segs Absolute 3.68 1.50 - 8.10 k/uL    Absolute Lymph # 2.37 1.10 - 3.70 k/uL    Absolute Mono # 0.60 0.10 - 1.20 k/uL    Absolute Eos # <0.03 0.00 - 0.44 k/uL    Basophils Absolute <0.03 0.00 - 0.20 k/uL    Absolute Immature Granulocyte 0.04 0.00 - 0.30 k/uL   Magnesium   Result Value Ref Range    Magnesium 2.3 1.6 - 2.6 mg/dL   Brain Natriuretic Peptide   Result Value Ref Range    Pro- (H) <300 pg/mL   EKG 12 Lead   Result Value Ref Range    Ventricular Rate 80 BPM    Atrial Rate 80 BPM    P-R Interval 148 ms    QRS Duration 132 ms    Q-T Interval 434 ms    QTc Calculation (Bazett) 500 ms    P Axis 37 degrees    R Axis -18 degrees    T Axis -24 degrees         RADIOLOGY:  XR CHEST PORTABLE    Result Date: 10/9/2022  EXAMINATION: ONE XRAY VIEW OF THE CHEST 10/9/2022 3:20 am COMPARISON: 08/25/2022 HISTORY: ORDERING SYSTEM PROVIDED HISTORY: asthma TECHNOLOGIST PROVIDED HISTORY: asthma Reason for Exam: upr,cough,fever r/o covid FINDINGS: Left basilar airspace disease. No pneumothorax is identified. Cardiac and osseous structures appear similar. No overt edema. Increased left basilar airspace disease suspicious for pneumonia.   Otherwise similar chest.       EKG  Normal sinus rhythm with frequent PVCs, rate: 80  CA: 148  QRS: 132  QT/QTc: 434/500  No ST elevation or depression  No T wave abnormality  Right bundle branch block    All EKG's are interpreted by the Emergency Department Physician who either signs or Co-signs this chart in the absence of a cardiologist.      MEDICATIONS ORDERED:  Orders Placed This Encounter   Medications    methylPREDNISolone sodium (SOLU-MEDROL) injection 125 mg    dextromethorphan-guaiFENesin (ROBITUSSIN-DM)  MG/5ML liquid 15 mL    DISCONTD: ipratropium-albuterol (DUONEB) nebulizer solution 1 ampule     Order Specific Question:   Initiate RT Bronchodilator Protocol     Answer:   Yes - ED protocol    DISCONTD: dextromethorphan-guaiFENesin (ROBITUSSIN-DM)  MG/5ML liquid 10 mL    magnesium sulfate 2000 mg in 50 mL IVPB premix    ketorolac (TORADOL) injection 15 mg    potassium chloride (KLOR-CON M) extended release tablet 40 mEq    amoxicillin-clavulanate (AUGMENTIN) 875-125 MG per tablet 1 tablet     Order Specific Question:   Antimicrobial Indications     Answer:   Pneumonia (CAP)    doxycycline hyclate (VIBRA-TABS) tablet 100 mg     Order Specific Question:   Antimicrobial Indications     Answer:   Pneumonia (CAP)    doxycycline hyclate (VIBRA-TABS) 100 MG tablet     Sig: Take 1 tablet by mouth 2 times daily for 7 days     Dispense:  14 tablet     Refill:  0    amoxicillin-clavulanate (AUGMENTIN) 875-125 MG per tablet     Sig: Take 1 tablet by mouth 2 times daily for 7 days     Dispense:  14 tablet     Refill:  0    predniSONE (DELTASONE) 50 MG tablet     Sig: Take 1 tablet by mouth daily for 5 days     Dispense:  5 tablet     Refill:  0    guaiFENesin-codeine (TUSSI-ORGANIDIN NR) 100-10 MG/5ML syrup     Sig: Take 10 mLs by mouth nightly as needed for Cough for up to 5 days. Dispense:  50 mL     Refill:  0    guaiFENesin-dextromethorphan (ROBITUSSIN DM) 100-10 MG/5ML syrup     Sig: Take 5 mLs by mouth 3 times daily as needed for Cough     Dispense:  120 mL     Refill:  0         PROCEDURES:  None      CONSULTS:  None      EMERGENCY DEPARTMENT COURSE:  0430: Patient with improved breath sounds after breathing treatment. Laboratory work is reassuring. Patient was given dose of magnesium for her breathing, as well as for her frequent PVCs. She did have some flushing reaction with it so rate was slowed down and this resolved. 1238: Patient's work-up is remarkable for a left basilar pneumonia. Discussed with patient admission versus outpatient treatment and risks and benefits of both. Patient elects for outpatient management at this time. Given that she was recently on a course of azithromycin and does have some risk factors, will discharge her home with Augmentin and doxycycline. Gave her her first doses of the emergency department. For her severe cough and difficulty sleeping recommended doing guaifenesin with codeine at night and then regular Robitussin during the day. Patient was instructed to call her primary care provider and to see if she can get a follow-up appointment sooner than what she has scheduled as well as follow-up with her pulmonologist.  Patient is to return for any worsening symptoms. FINAL IMPRESSION      1.  Pneumonia of left lower lobe due to infectious organism          DISPOSITION / PLAN     DISPOSITION Decision To Discharge 10/09/2022 05:25:13 AM      PATIENT REFERRED TO:  Geremias Harris MD  [facility]  [address]  [facility] [facility]  [phone]    Schedule an appointment as soon as possible for a visit       Prudencio Boyce MD  [place] [address]  [address] [facility]  [phone]    Schedule an appointment as soon as possible for a visit       [facility]  [address] [place] [address]  [phone]    If symptoms worsen      DISCHARGE MEDICATIONS:  Discharge Medication List as of 10/9/2022  5:36 AM        START taking these medications    Details   doxycycline hyclate (VIBRA-TABS) 100 MG tablet Take 1 tablet by mouth 2 times daily for 7 days, Disp-14 tablet, R-0Print      amoxicillin-clavulanate (AUGMENTIN) 875-125 MG per tablet Take 1 tablet by mouth 2 times daily for 7 days, Disp-14 tablet, R-0Print      guaiFENesin-codeine (TUSSI-ORGANIDIN NR) 100-10 MG/5ML syrup Take 10 mLs by mouth nightly as needed for Cough for up to 5 days. , Disp-50 mL, R-0Print      guaiFENesin-dextromethorphan (ROBITUSSIN DM) 100-10 MG/5ML syrup Take 5 mLs by mouth 3 times daily as needed for Cough, Disp-120 mL, R-0Print             Pan Garnica DO  Emergency Medicine Resident    (Please note that portions of this note were completed with a voice recognition program.Efforts were made to edit the dictations but occasionally words are mis-transcribed.)       Pan Garnica DO  Resident  10/11/22 4315

## 2022-10-11 NOTE — PROGRESS NOTES
PULMONARY OP  PROGRESS NOTE      Patient:  Ángel Porras  YOB: 1950    MRN: [id number]     Acct:        Pt seen and Chart reviewed. Ms. Ángel Porras is here in followup for   1. Moderate persistent asthma without complication    2. Post-nasal drip    3. Gastroesophageal reflux disease, unspecified whether esophagitis present    4. Obesity due to excess calories, unspecified obesity severity    5. Chronic seasonal allergic rhinitis due to pollen    6. History of COVID-19          Pt has not been hospitalized but has been to the emergency room and urgent care for acute asthma approximately 4 times in the past 6 months  Had covid 19 infection in January 2022  No new hobbies or pets  Though the patient initially denied any chemical exposures after her  prompted she mention that she uses bleach to spray in the house to decrease risk of COVID-19  Has been using meds as recommended. The patient denied having any significant cough. No shortness of breath or wheezing  She uses Flovent  She needs albuterol frequently  Currently is on prednisone provided by the ER  No chest pain or pressure. No acid reflux symptoms. She has some postnasal discharge  She has been working on losing weight.     Subjective:  ROS    Review of Systems -   General ROS: Completed and except as mentioned above were negative   Psychological ROS:  Completed and except as mentioned above were negative  Ophthalmic ROS:  Completed and except as mentioned above were negative  ENT ROS:  Completed and except as mentioned above were negative  Allergy and Immunology ROS:  Completed and except as mentioned above were negative  Hematological and Lymphatic ROS:  Completed and except as mentioned above were negative  Endocrine ROS: Completed and except as mentioned above were negative  Respiratory ROS:  Completed and except as mentioned above were negative  Cardiovascular ROS:  Completed and except as mentioned above were negative  Gastrointestinal ROS: Completed and except as mentioned above were negative  Genito-Urinary ROS:  Completed and except as mentioned above were negative  Musculoskeletal ROS:  Completed and except as mentioned above were negative  Neurological ROS:  Completed and except as mentioned above were negative  Dermatological ROS:  Completed and except as mentioned above were negative          Allergies:  No Known Allergies    Medications:    Current Outpatient Medications:     predniSONE (DELTASONE) 10 MG tablet, Tapered dose 40mg x 3 days. .. 30mg x 3 days. .. 20mg x 3 days . Author Showman .10mg x3days . ..then off, Disp: 30 tablet, Rfl: 1    montelukast (SINGULAIR) 10 MG tablet, Take 1 tablet by mouth nightly, Disp: 30 tablet, Rfl: 5    fluticasone-umeclidin-vilant (TRELEGY ELLIPTA) 100-62.5-25 MCG/INH AEPB, Inhale 1 puff into the lungs daily, Disp: 1 each, Rfl: 11    amoxicillin-clavulanate (AUGMENTIN) 875-125 MG per tablet, Take 1 tablet by mouth 2 times daily for 7 days, Disp: 14 tablet, Rfl: 0    predniSONE (DELTASONE) 50 MG tablet, Take 1 tablet by mouth daily for 5 days, Disp: 5 tablet, Rfl: 0    guaiFENesin-codeine (TUSSI-ORGANIDIN NR) 100-10 MG/5ML syrup, Take 10 mLs by mouth nightly as needed for Cough for up to 5 days. , Disp: 50 mL, Rfl: 0    guaiFENesin-dextromethorphan (ROBITUSSIN DM) 100-10 MG/5ML syrup, Take 5 mLs by mouth 3 times daily as needed for Cough, Disp: 120 mL, Rfl: 0    albuterol sulfate HFA (PROVENTIL;VENTOLIN;PROAIR) 108 (90 Base) MCG/ACT inhaler, Inhale 2 puffs into the lungs every 6 hours as needed for Wheezing or Shortness of Breath, Disp: 18 g, Rfl: 1    fluticasone (FLONASE) 50 MCG/ACT nasal spray, SPRAY 2 SPRAYS BY NASAL ROUTE EVERY DAY, Disp: 1 each, Rfl: 9    metFORMIN (GLUCOPHAGE-XR) 500 MG extended release tablet, TAKE 1 TABLET BY MOUTH EVERY DAY WITH BREAKFAST, Disp: 90 tablet, Rfl: 0    FLOVENT  MCG/ACT inhaler, TAKE 1 PUFF BY MOUTH TWICE A DAY, Disp: 12 each, Rfl: 3    atorvastatin (LIPITOR) 10 MG tablet, TAKE 1 TABLET BY MOUTH EVERY DAY, Disp: 90 tablet, Rfl: 0    Handicap Placard MISC, by Does not apply route Expires 6/23/26., Disp: 1 each, Rfl: 0    omeprazole (PRILOSEC) 20 MG delayed release capsule, TAKE 1 CAPSULE BY MOUTH EVERY DAY, Disp: 90 capsule, Rfl: 1    balsalazide (COLAZAL) 750 MG capsule, Take 2 capsules by mouth 2 times daily, Disp: 120 capsule, Rfl: 11    mesalamine (LIALDA) 1.2 g EC tablet, Take 1 tablet by mouth daily (with breakfast), Disp: 30 tablet, Rfl: 3    blood glucose test strips (TRUE METRIX BLOOD GLUCOSE TEST) strip, TEST 1 TIME DAILY FOR SYMPTOMS OF IRREGULAR BLOOD GLUCOSE., Disp: 100 strip, Rfl: 3    metoprolol succinate (TOPROL XL) 50 MG extended release tablet, TAKE 1 TABLET BY MOUTH EVERY DAY, Disp: , Rfl:     TRUEPLUS LANCETS 30G MISC, Test 1 times a day for symptoms of irregular blood glucose., Disp: 100 each, Rfl: 3    albuterol sulfate HFA (PROAIR HFA) 108 (90 Base) MCG/ACT inhaler, Inhale 2 puffs into the lungs every 6 hours as needed for Wheezing, Disp: 1 Inhaler, Rfl: 3    meclizine (ANTIVERT) 25 MG tablet, TAKE 1 TABLET BY MOUTH THREE TIMES A DAY AS NEEDED FOR NAUSEA or dizziness , Disp: 60 tablet, Rfl: 1    promethazine (PHENERGAN) 25 MG tablet, TAKE 1/2 TABLET BY MOUTH EVERY SIX HOURS AS NEEDED FOR NAUSEA, Disp: 30 tablet, Rfl: 0    ondansetron (ZOFRAN-ODT) 4 MG disintegrating tablet, DISSOLVE 1 TABLET IN MOUTH EVERY EIGHT HOURS AS NEEDED FOR NAUSEA AND VOMITING, Disp: 30 tablet, Rfl: 0    Blood Glucose Monitoring Suppl SU, Inject 1 each into the skin daily and prn for diabetes (Patient taking differently: Inject 1 each into the skin daily for diabetes), Disp: 1 Device, Rfl: 0    amLODIPine (NORVASC) 2.5 MG tablet, Take 1 tablet by mouth daily, Disp: 30 tablet, Rfl: 3    Loratadine (CLARITIN PO), Take by mouth daily , Disp: , Rfl:     Respiratory Therapy Supplies (SPIROMETER) KIT, Take 1 inhalation by mouth 6 times daily, Disp: 1 kit, Rfl: 1      Objective:    Physical Exam:  Vitals: /74   Pulse 82   Resp 20   Ht 5' 1\" (1.549 m)   Wt 160 lb (72.6 kg)   SpO2 98% Comment: room air  BMI 30.23 kg/m²   Last 3 weights: Wt Readings from Last 3 Encounters:   10/11/22 160 lb (72.6 kg)   10/09/22 160 lb (72.6 kg)   09/07/22 160 lb (72.6 kg)     Body mass index is 30.23 kg/m². Physical Examination:   PHYSICAL EXAMINATION:  Vitals:    10/11/22 1520   BP: 122/74   Pulse: 82   Resp: 20   SpO2: 98%   Weight: 160 lb (72.6 kg)   Height: 5' 1\" (1.549 m)       Physical Exam    Constitutional: This is a well developed, well nourish. Her BMI was, 30-34.9 - Obesity Grade I 71y.o. year old female who is alert, oriented, cooperative and in no apparent distress. Head:normocephalic and atraumatic. EENT:   ADAM. No conjunctival injections. Septum was midline, mucosa was without erythema, exudates or cobblestoning. No thrush was noted. Mallampati II (soft palate, uvula, fauces visible)  Neck: Supple without thyromegaly. No elevated JVP. Trachea was midline. Respiratory: Chest was symmetrical without dullness to percussion. Breath sounds bilaterally were clear to auscultation. There were no wheezes, rhonchi or rales. There is no intercostal retraction or use of accessory muscles. No egophony noted. Cardiovascular: Regular without murmur, clicks, gallops or rubs. Abdomen: Slightly rounded and soft without organomegaly. No rebound tenderness, rigidity or guarding was appreciated. Lymphatic: No lymphadenopathy. Musculoskeletal: Normal curvature of the spine. No gross muscle weakness. Extremities:  No lower extremity edema, ulcerations, tenderness, varicosities or erythema. Muscle size, tone and strength are normal.  No involuntary movements are noted. Skin:  Warm and dry. Good color, turgor and pigmentation. No lesions or scars.   Neurological/Psychiatric: The patient's general behavior, level of consciousness, thought content and emotional status is normal.       Labs:    Chest x-ray was done at [address] [place] [address] in September 2021 that showed bibasilar subsegmental linear atelectasis    Chest x-ray was also done in November 2021 that showed left basilar discoid atelectasis and enlarged cardiac silhouette and a repeat x-ray on the same day showed the same findings    CXR in feb 2022 showed-  Mild scarring in the left lung base. Examination is otherwise unremarkable. Assessment:    1. Moderate persistent asthma without complication    2. Post-nasal drip    3. Gastroesophageal reflux disease, unspecified whether esophagitis present    4. Obesity due to excess calories, unspecified obesity severity    5. Chronic seasonal allergic rhinitis due to pollen    6. History of COVID-19            PLAN:    Reviewed the chest x-ray with the patient  Completed Asthma education   Refills were provided.-Flovent 110, wiliam  Patient was recommended to have prednisone and an antibiotic available for use during an exacerbation  Educated and clarified the medication use. Discussed use, benefit, and side effects of prescribed medications. Barriers to medication compliance addressed. Oneyda Thorne received counseling on the following healthy behaviors: nutrition, exercise and medication adherence  Recommend flu vaccination in the fall annually. Recommendations given regarding pneumococcal vaccinations. Had the COVID-19 vaccination  Patient is up-to-date with vaccinations from pulmonary perspective. Maintain an active lifestyle. Patient was educated on how to use the respiratory medications. All the questions that the patient  has had were answered to her satisfaction  Home O2 evaluation to be done. Supplemental oxygen was not needed. After reviewing the patient's smoking history and her age patient does not meet the criteria for lung cancer screening as the pt never smoked.   We'll see the patient back in 6 months or earlier if needed  She will call us if she is sick and need to be seen sooner  Thank you for having us involved in the care of your patient. Please call us if you have any questions or concerns.         Dottie Amezquita MD, MD             10/11/2022, 3:44 PM

## 2022-10-14 ENCOUNTER — HOSPITAL ENCOUNTER (OUTPATIENT)
Dept: CT IMAGING | Age: 72
Discharge: HOME | End: 2022-10-16
Payer: MEDICARE

## 2022-10-14 DIAGNOSIS — R09.82 POST-NASAL DRIP: ICD-10-CM

## 2022-10-14 PROCEDURE — 70486 CT MAXILLOFACIAL W/O DYE: CPT

## 2022-10-15 DIAGNOSIS — J32.9 SINUSITIS, UNSPECIFIED CHRONICITY, UNSPECIFIED LOCATION: Primary | ICD-10-CM

## 2022-10-15 RX ORDER — AMOXICILLIN AND CLAVULANATE POTASSIUM 875; 125 MG/1; MG/1
1 TABLET, FILM COATED ORAL 2 TIMES DAILY
Qty: 14 TABLET | Refills: 0 | Status: SHIPPED | OUTPATIENT
Start: 2022-10-15 | End: 2022-10-22

## 2022-10-15 RX ORDER — FLUTICASONE PROPIONATE 50 MCG
2 SPRAY, SUSPENSION (ML) NASAL DAILY
Qty: 16 G | Refills: 5 | Status: SHIPPED | OUTPATIENT
Start: 2022-10-15

## 2022-10-25 ENCOUNTER — HOSPITAL ENCOUNTER (OUTPATIENT)
Age: 72
Discharge: HOME | End: 2022-10-27
Payer: MEDICARE

## 2022-10-25 ENCOUNTER — HOSPITAL ENCOUNTER (OUTPATIENT)
Dept: GENERAL RADIOLOGY | Age: 72
Discharge: HOME | End: 2022-10-27
Payer: MEDICARE

## 2022-10-25 DIAGNOSIS — J18.9 PNEUMONIA OF LEFT LOWER LOBE DUE TO INFECTIOUS ORGANISM: ICD-10-CM

## 2022-10-25 PROCEDURE — 71046 X-RAY EXAM CHEST 2 VIEWS: CPT

## 2022-10-31 ENCOUNTER — HOSPITAL ENCOUNTER (OUTPATIENT)
Dept: WOMENS IMAGING | Age: 72
Discharge: HOME | End: 2022-11-02
Payer: MEDICARE

## 2022-10-31 DIAGNOSIS — Z12.31 ENCOUNTER FOR SCREENING MAMMOGRAM FOR MALIGNANT NEOPLASM OF BREAST: ICD-10-CM

## 2022-10-31 PROCEDURE — 77063 BREAST TOMOSYNTHESIS BI: CPT

## 2022-11-02 RX ORDER — MONTELUKAST SODIUM 10 MG/1
10 TABLET ORAL NIGHTLY
Qty: 30 TABLET | Refills: 5 | OUTPATIENT
Start: 2022-11-02 | End: 2022-12-02

## 2022-11-30 ENCOUNTER — HOSPITAL ENCOUNTER (OUTPATIENT)
Age: 72
Setting detail: SPECIMEN
Discharge: HOME | End: 2022-11-30

## 2022-11-30 DIAGNOSIS — I10 ESSENTIAL HYPERTENSION: ICD-10-CM

## 2022-11-30 DIAGNOSIS — E11.9 TYPE 2 DIABETES MELLITUS WITHOUT COMPLICATION, WITHOUT LONG-TERM CURRENT USE OF INSULIN (HCC): ICD-10-CM

## 2022-11-30 DIAGNOSIS — E55.9 VITAMIN D DEFICIENCY: ICD-10-CM

## 2022-11-30 DIAGNOSIS — E78.2 MIXED HYPERLIPIDEMIA: ICD-10-CM

## 2022-11-30 PROBLEM — E78.00 PURE HYPERCHOLESTEROLEMIA: Status: ACTIVE | Noted: 2022-11-30

## 2022-11-30 LAB
ABSOLUTE EOS #: 0.06 K/UL (ref 0–0.44)
ABSOLUTE IMMATURE GRANULOCYTE: 0.05 K/UL (ref 0–0.3)
ABSOLUTE LYMPH #: 2.61 K/UL (ref 1.1–3.7)
ABSOLUTE MONO #: 0.53 K/UL (ref 0.1–1.2)
BASOPHILS # BLD: 1 % (ref 0–2)
BASOPHILS ABSOLUTE: 0.04 K/UL (ref 0–0.2)
CREATININE URINE: 180.5 MG/DL (ref 28–217)
EOSINOPHILS RELATIVE PERCENT: 1 % (ref 1–4)
ESTIMATED AVERAGE GLUCOSE: 160 MG/DL
HBA1C MFR BLD: 7.2 % (ref 4–6)
HCT VFR BLD CALC: 48.9 % (ref 36.3–47.1)
HEMOGLOBIN: 15.2 G/DL (ref 11.9–15.1)
IMMATURE GRANULOCYTES: 1 %
LYMPHOCYTES # BLD: 34 % (ref 24–43)
MCH RBC QN AUTO: 29.1 PG (ref 25.2–33.5)
MCHC RBC AUTO-ENTMCNC: 31.1 G/DL (ref 28.4–34.8)
MCV RBC AUTO: 93.7 FL (ref 82.6–102.9)
MICROALBUMIN/CREAT 24H UR: 20 MG/L
MICROALBUMIN/CREAT UR-RTO: 11 MCG/MG CREAT
MONOCYTES # BLD: 7 % (ref 3–12)
NRBC AUTOMATED: 0 PER 100 WBC
PDW BLD-RTO: 13.1 % (ref 11.8–14.4)
PLATELET # BLD: 258 K/UL (ref 138–453)
PMV BLD AUTO: 11.7 FL (ref 8.1–13.5)
RBC # BLD: 5.22 M/UL (ref 3.95–5.11)
SEG NEUTROPHILS: 56 % (ref 36–65)
SEGMENTED NEUTROPHILS ABSOLUTE COUNT: 4.5 K/UL (ref 1.5–8.1)
WBC # BLD: 7.8 K/UL (ref 3.5–11.3)

## 2022-12-01 LAB
ALBUMIN SERPL-MCNC: 4.1 G/DL (ref 3.5–5.2)
ALBUMIN/GLOBULIN RATIO: 1.5 (ref 1–2.5)
ALP BLD-CCNC: 93 U/L (ref 35–104)
ALT SERPL-CCNC: 14 U/L (ref 5–33)
ANION GAP SERPL CALCULATED.3IONS-SCNC: 17 MMOL/L (ref 9–17)
AST SERPL-CCNC: 19 U/L
BILIRUB SERPL-MCNC: 0.8 MG/DL (ref 0.3–1.2)
BUN BLDV-MCNC: 16 MG/DL (ref 8–23)
CALCIUM SERPL-MCNC: 9.4 MG/DL (ref 8.6–10.4)
CHLORIDE BLD-SCNC: 102 MMOL/L (ref 98–107)
CHOLESTEROL/HDL RATIO: 2.8
CHOLESTEROL: 168 MG/DL
CO2: 22 MMOL/L (ref 20–31)
CREAT SERPL-MCNC: 0.69 MG/DL (ref 0.5–0.9)
GFR SERPL CREATININE-BSD FRML MDRD: >60 ML/MIN/1.73M2
GLUCOSE BLD-MCNC: 145 MG/DL (ref 70–99)
HDLC SERPL-MCNC: 60 MG/DL
LDL CHOLESTEROL: 89 MG/DL (ref 0–130)
POTASSIUM SERPL-SCNC: 4.7 MMOL/L (ref 3.7–5.3)
SODIUM BLD-SCNC: 141 MMOL/L (ref 135–144)
TOTAL PROTEIN: 6.9 G/DL (ref 6.4–8.3)
TRIGL SERPL-MCNC: 97 MG/DL
VITAMIN D 25-HYDROXY: 23.8 NG/ML

## 2022-12-15 RX ORDER — BALSALAZIDE DISODIUM 750 MG/1
CAPSULE ORAL
Qty: 360 CAPSULE | Refills: 3 | Status: SHIPPED | OUTPATIENT
Start: 2022-12-15

## 2023-02-13 RX ORDER — MONTELUKAST SODIUM 10 MG/1
10 TABLET ORAL NIGHTLY
Qty: 90 TABLET | Refills: 1 | Status: SHIPPED | OUTPATIENT
Start: 2023-02-13 | End: 2023-03-15

## 2023-02-13 NOTE — TELEPHONE ENCOUNTER
LAST VISIT: 10/11/22  NEXT VISIT: 3/28/23    Per last dictation patient is on this medication. Please sign for refill if ok. Thank you.

## 2023-03-13 PROBLEM — E11.9 TYPE 2 DIABETES MELLITUS WITHOUT COMPLICATION, WITHOUT LONG-TERM CURRENT USE OF INSULIN (HCC): Status: RESOLVED | Noted: 2019-09-03 | Resolved: 2023-03-13

## 2023-03-24 ENCOUNTER — HOSPITAL ENCOUNTER (OUTPATIENT)
Dept: GENERAL RADIOLOGY | Facility: CLINIC | Age: 73
End: 2023-03-24
Payer: MEDICARE

## 2023-03-24 ENCOUNTER — HOSPITAL ENCOUNTER (OUTPATIENT)
Facility: CLINIC | Age: 73
End: 2023-03-24
Payer: MEDICARE

## 2023-03-24 DIAGNOSIS — J20.8 ACUTE BRONCHITIS DUE TO OTHER SPECIFIED ORGANISMS: ICD-10-CM

## 2023-03-24 DIAGNOSIS — R05.9 COUGH, UNSPECIFIED TYPE: ICD-10-CM

## 2023-03-24 DIAGNOSIS — J45.30 MILD PERSISTENT ASTHMA WITHOUT COMPLICATION: ICD-10-CM

## 2023-03-24 PROCEDURE — 71046 X-RAY EXAM CHEST 2 VIEWS: CPT

## 2023-03-28 ENCOUNTER — OFFICE VISIT (OUTPATIENT)
Dept: PULMONOLOGY | Age: 73
End: 2023-03-28
Payer: MEDICARE

## 2023-03-28 VITALS
BODY MASS INDEX: 29.44 KG/M2 | SYSTOLIC BLOOD PRESSURE: 128 MMHG | RESPIRATION RATE: 12 BRPM | HEART RATE: 81 BPM | WEIGHT: 160 LBS | HEIGHT: 62 IN | OXYGEN SATURATION: 96 % | DIASTOLIC BLOOD PRESSURE: 83 MMHG

## 2023-03-28 DIAGNOSIS — Z86.16 HISTORY OF COVID-19: ICD-10-CM

## 2023-03-28 DIAGNOSIS — J32.8 OTHER CHRONIC SINUSITIS: ICD-10-CM

## 2023-03-28 DIAGNOSIS — J30.1 CHRONIC SEASONAL ALLERGIC RHINITIS DUE TO POLLEN: ICD-10-CM

## 2023-03-28 DIAGNOSIS — J45.40 MODERATE PERSISTENT ASTHMA WITHOUT COMPLICATION: Primary | ICD-10-CM

## 2023-03-28 DIAGNOSIS — E66.09 OBESITY DUE TO EXCESS CALORIES, UNSPECIFIED OBESITY SEVERITY: ICD-10-CM

## 2023-03-28 DIAGNOSIS — R09.82 POST-NASAL DRIP: ICD-10-CM

## 2023-03-28 DIAGNOSIS — K21.9 GASTROESOPHAGEAL REFLUX DISEASE, UNSPECIFIED WHETHER ESOPHAGITIS PRESENT: ICD-10-CM

## 2023-03-28 PROCEDURE — 99214 OFFICE O/P EST MOD 30 MIN: CPT | Performed by: INTERNAL MEDICINE

## 2023-03-28 PROCEDURE — 3078F DIAST BP <80 MM HG: CPT | Performed by: INTERNAL MEDICINE

## 2023-03-28 PROCEDURE — G8484 FLU IMMUNIZE NO ADMIN: HCPCS | Performed by: INTERNAL MEDICINE

## 2023-03-28 PROCEDURE — 1123F ACP DISCUSS/DSCN MKR DOCD: CPT | Performed by: INTERNAL MEDICINE

## 2023-03-28 PROCEDURE — 1090F PRES/ABSN URINE INCON ASSESS: CPT | Performed by: INTERNAL MEDICINE

## 2023-03-28 PROCEDURE — 3017F COLORECTAL CA SCREEN DOC REV: CPT | Performed by: INTERNAL MEDICINE

## 2023-03-28 PROCEDURE — G8427 DOCREV CUR MEDS BY ELIG CLIN: HCPCS | Performed by: INTERNAL MEDICINE

## 2023-03-28 PROCEDURE — G8417 CALC BMI ABV UP PARAM F/U: HCPCS | Performed by: INTERNAL MEDICINE

## 2023-03-28 PROCEDURE — 3074F SYST BP LT 130 MM HG: CPT | Performed by: INTERNAL MEDICINE

## 2023-03-28 PROCEDURE — 1036F TOBACCO NON-USER: CPT | Performed by: INTERNAL MEDICINE

## 2023-03-28 PROCEDURE — G8400 PT W/DXA NO RESULTS DOC: HCPCS | Performed by: INTERNAL MEDICINE

## 2023-03-28 RX ORDER — ALBUTEROL SULFATE 90 UG/1
2 AEROSOL, METERED RESPIRATORY (INHALATION) EVERY 6 HOURS PRN
Qty: 1 EACH | Refills: 5 | Status: SHIPPED | OUTPATIENT
Start: 2023-03-28

## 2023-03-28 RX ORDER — FLUTICASONE FUROATE, UMECLIDINIUM BROMIDE AND VILANTEROL TRIFENATATE 100; 62.5; 25 UG/1; UG/1; UG/1
1 POWDER RESPIRATORY (INHALATION) DAILY
COMMUNITY
End: 2023-03-28 | Stop reason: SDUPTHER

## 2023-03-28 RX ORDER — FLUTICASONE PROPIONATE 50 MCG
SPRAY, SUSPENSION (ML) NASAL
Qty: 1 EACH | Refills: 5 | Status: SHIPPED | OUTPATIENT
Start: 2023-03-28

## 2023-03-28 RX ORDER — PREDNISONE 10 MG/1
TABLET ORAL
Qty: 30 TABLET | Refills: 0 | Status: SHIPPED | OUTPATIENT
Start: 2023-03-28

## 2023-03-28 RX ORDER — AZITHROMYCIN 250 MG/1
TABLET, FILM COATED ORAL
Qty: 1 PACKET | Refills: 0 | Status: SHIPPED | OUTPATIENT
Start: 2023-03-28

## 2023-03-28 RX ORDER — FLUTICASONE FUROATE, UMECLIDINIUM BROMIDE AND VILANTEROL TRIFENATATE 100; 62.5; 25 UG/1; UG/1; UG/1
1 POWDER RESPIRATORY (INHALATION) DAILY
Qty: 1 EACH | Refills: 5 | Status: SHIPPED | OUTPATIENT
Start: 2023-03-28

## 2023-03-28 RX ORDER — MONTELUKAST SODIUM 10 MG/1
10 TABLET ORAL NIGHTLY
Qty: 90 TABLET | Refills: 1 | Status: SHIPPED | OUTPATIENT
Start: 2023-03-28 | End: 2023-04-27

## 2023-03-28 NOTE — PROGRESS NOTES
PULMONARY OP  PROGRESS NOTE      Patient:  Jimmie Foreman  YOB: 1950    MRN: [id number]     Acct:        Pt seen and Chart reviewed. Ms. Jimmie Foreman is here in followup for   1. Moderate persistent asthma without complication    2. Post-nasal drip    3. Gastroesophageal reflux disease, unspecified whether esophagitis present    4. Obesity due to excess calories, unspecified obesity severity    5. Chronic seasonal allergic rhinitis due to pollen    6. History of COVID-19    7. Other chronic sinusitis          Pt has not been hospitalized nor been to the emergency room since last visit  She had significant improvement since last seeing me but of late had an upper respiratory infection  She did have a bad upper respiratory infection for the past 3 weeks and at the time of this visit she feels it is showing improvement  She used prednisone for 5 days  Did not need any antibiotics  Has been using meds as recommended. Shortness of breath and wheezing are improving  Not much cough or sputum production  No chest pain or pressure. No acid reflux symptoms. Has no post nasal discharge and she is using Flonase. No sinus pain  She has been working on losing weight.       Subjective:  ROS    Review of Systems -   General ROS: Completed and except as mentioned above were negative   Psychological ROS:  Completed and except as mentioned above were negative  Ophthalmic ROS:  Completed and except as mentioned above were negative  ENT ROS:  Completed and except as mentioned above were negative  Allergy and Immunology ROS:  Completed and except as mentioned above were negative  Hematological and Lymphatic ROS:  Completed and except as mentioned above were negative  Endocrine ROS: Completed and except as mentioned above were negative  Respiratory ROS:  Completed and except as mentioned above were negative  Cardiovascular ROS:  Completed and

## 2023-03-29 RX ORDER — FLUTICASONE PROPIONATE 50 MCG
SPRAY, SUSPENSION (ML) NASAL
Refills: 3 | OUTPATIENT
Start: 2023-03-29

## 2023-05-08 ENCOUNTER — TELEPHONE (OUTPATIENT)
Dept: PULMONOLOGY | Age: 73
End: 2023-05-08

## 2023-05-08 NOTE — TELEPHONE ENCOUNTER
Spoke with patient and informed.   I got her an appt scheduled with [facility] tomorrow so she can get help better understanding whats going on with her

## 2023-05-08 NOTE — TELEPHONE ENCOUNTER
Patient called stating that since last Tues she has had a really bad cough and it feels like these is glass in her lungs,  She is concerned if she is taking her asthma medication correctly.   I did verify pharmacy Please advise

## 2023-05-09 ENCOUNTER — HOSPITAL ENCOUNTER (OUTPATIENT)
Facility: CLINIC | Age: 73
Discharge: HOME | End: 2023-05-11
Payer: MEDICARE

## 2023-05-09 ENCOUNTER — HOSPITAL ENCOUNTER (OUTPATIENT)
Age: 73
Setting detail: SPECIMEN
Discharge: HOME | End: 2023-05-09

## 2023-05-09 ENCOUNTER — OFFICE VISIT (OUTPATIENT)
Dept: PULMONOLOGY | Age: 73
End: 2023-05-09
Payer: MEDICARE

## 2023-05-09 ENCOUNTER — HOSPITAL ENCOUNTER (OUTPATIENT)
Dept: GENERAL RADIOLOGY | Facility: CLINIC | Age: 73
Discharge: HOME | End: 2023-05-11
Payer: MEDICARE

## 2023-05-09 VITALS
SYSTOLIC BLOOD PRESSURE: 190 MMHG | BODY MASS INDEX: 29.98 KG/M2 | HEART RATE: 68 BPM | DIASTOLIC BLOOD PRESSURE: 100 MMHG | RESPIRATION RATE: 12 BRPM | HEIGHT: 61 IN | WEIGHT: 158.8 LBS

## 2023-05-09 DIAGNOSIS — R09.82 POST-NASAL DRIP: ICD-10-CM

## 2023-05-09 DIAGNOSIS — J30.1 CHRONIC SEASONAL ALLERGIC RHINITIS DUE TO POLLEN: Primary | ICD-10-CM

## 2023-05-09 DIAGNOSIS — J45.40 MODERATE PERSISTENT ASTHMA WITHOUT COMPLICATION: ICD-10-CM

## 2023-05-09 DIAGNOSIS — J98.01 BRONCHOSPASM: ICD-10-CM

## 2023-05-09 DIAGNOSIS — J32.8 OTHER CHRONIC SINUSITIS: ICD-10-CM

## 2023-05-09 DIAGNOSIS — K21.9 GASTROESOPHAGEAL REFLUX DISEASE, UNSPECIFIED WHETHER ESOPHAGITIS PRESENT: ICD-10-CM

## 2023-05-09 DIAGNOSIS — Z86.16 HISTORY OF COVID-19: ICD-10-CM

## 2023-05-09 LAB
EOSINOPHILS ABSOLUTE: 0 /UL (ref 200–400)
IGE: 37 IU/ML

## 2023-05-09 PROCEDURE — G8400 PT W/DXA NO RESULTS DOC: HCPCS | Performed by: NURSE PRACTITIONER

## 2023-05-09 PROCEDURE — 3017F COLORECTAL CA SCREEN DOC REV: CPT | Performed by: NURSE PRACTITIONER

## 2023-05-09 PROCEDURE — 99214 OFFICE O/P EST MOD 30 MIN: CPT | Performed by: NURSE PRACTITIONER

## 2023-05-09 PROCEDURE — 1123F ACP DISCUSS/DSCN MKR DOCD: CPT | Performed by: NURSE PRACTITIONER

## 2023-05-09 PROCEDURE — G8417 CALC BMI ABV UP PARAM F/U: HCPCS | Performed by: NURSE PRACTITIONER

## 2023-05-09 PROCEDURE — 1036F TOBACCO NON-USER: CPT | Performed by: NURSE PRACTITIONER

## 2023-05-09 PROCEDURE — 3077F SYST BP >= 140 MM HG: CPT | Performed by: NURSE PRACTITIONER

## 2023-05-09 PROCEDURE — 1090F PRES/ABSN URINE INCON ASSESS: CPT | Performed by: NURSE PRACTITIONER

## 2023-05-09 PROCEDURE — G8427 DOCREV CUR MEDS BY ELIG CLIN: HCPCS | Performed by: NURSE PRACTITIONER

## 2023-05-09 PROCEDURE — 71046 X-RAY EXAM CHEST 2 VIEWS: CPT

## 2023-05-09 PROCEDURE — 3080F DIAST BP >= 90 MM HG: CPT | Performed by: NURSE PRACTITIONER

## 2023-05-09 RX ORDER — ALBUTEROL SULFATE 90 UG/1
2 AEROSOL, METERED RESPIRATORY (INHALATION) EVERY 6 HOURS PRN
Qty: 18 G | Refills: 1 | Status: SHIPPED | OUTPATIENT
Start: 2023-05-09

## 2023-05-09 ASSESSMENT — ENCOUNTER SYMPTOMS
EYES NEGATIVE: 1
GASTROINTESTINAL NEGATIVE: 1

## 2023-05-09 NOTE — PROGRESS NOTES
Subjective:      Patient ID: Abdiel Paredes is a 68 y.o. female. HPI  Patient here for sick call, history of asthma, seasonal allergies and COVID. Last seen in office per Dr. Mary Rizzo in March 2023    Patient states that she has been having difficulty with recurrent infections approximately every other month. Initially feels better on antibiotics and steroids but then relapses and starts with a chesty cough that is generally nonproductive. When she is able to expectorate secretions, I did witness her sputum this morning and it is a white color that is thicker. She is around young children, states that she has a history of seasonal allergies previously had allergy testing done many years ago. She continues on her Singulair, Trelegy, albuterol HFA and Prilosec. She is also on omeprazole and Claritin. Discussed transitioning from Claritin to another antihistamine in the same drug class, Zyrtec or Allegra, advised that she should avoid the decongestant additives. Blood pressure is quite elevated today, patient states that when she is coughing she gets very tight and feels that she is struggling so hard to abort the coughing spasm, and that while she is on prednisone she has noticed that her blood pressure and her blood sugars have both been elevated. She does state that her cardiologist recently took her off amlodipine. She continues on metoprolol 50 mg daily. I did advise that patient needs to contact her cardiologist today regarding her blood pressure elevation, may need to adjust her antihypertensives. On exam her chest is very coarse with scattered rhonchi, I did order a chest x-ray today in addition to blood work for allergen respiratory panel, eosinophil and IgE counts, patient may be a candidate for biologic therapy. Patient also has a Acapella device at home, encourage patient to use it regularly and increased use when she feels like she is getting sick.   She recently traveled back from

## 2023-05-11 LAB
2000687N OAK TREE IGE: 0.27 KU/L (ref 0–0.34)
ALLERGEN BERMUDA GRASS IGE: 0.29 KU/L (ref 0–0.34)
ALLERGEN BIRCH IGE: 0.2 KU/L (ref 0–0.34)
ALLERGEN DOG DANDER IGE: 1.4 KU/L (ref 0–0.34)
ALLERGEN GERMAN COCKROACH IGE: <0.1 KU/L (ref 0–0.34)
ALLERGEN HORMODENDRUM IGE: <0.1 KUL/L (ref 0–0.34)
ALLERGEN MOUSE EPITHELIA IGE: <0.1 KU/L (ref 0–0.34)
ALLERGEN PECAN TREE IGE: 0.25 KU/L (ref 0–0.34)
ALLERGEN PIGWEED ROUGH IGE: 0.14 KU/L (ref 0–0.34)
ALLERGEN SHEEP SORREL (W18) IGE: 0.27 KU/L (ref 0–0.34)
ALLERGEN TREE SYCAMORE: 0.16 KU/L (ref 0–0.34)
ALLERGEN WALNUT TREE IGE: 0.44 KU/L (ref 0–0.34)
ALLERGEN WHITE MULBERRY TREE, IGE: <0.1 KU/L (ref 0–0.34)
ALLERGEN, TREE, WHITE ASH IGE: 0.42 KU/L (ref 0–0.34)
ALTERNARIA ALTERNATA: <0.1 KU/L (ref 0–0.34)
ASPERGILLUS FUMIGATUS: <0.1 KU/L (ref 0–0.34)
CAT DANDER ANTIBODY: 7.21 KU/L (ref 0–0.34)
COTTONWOOD TREE: 0.14 KU/L (ref 0–0.34)
D. FARINAE: <0.1 KU/L (ref 0–0.34)
D. PTERONYSSINUS: <0.1 KU/L (ref 0–0.34)
ELM TREE: 0.22 KU/L (ref 0–0.34)
IGE: 37 IU/ML
MAPLE/BOXELDER TREE: 0.25 KU/L (ref 0–0.34)
MOUNTAIN CEDAR TREE: 0.48 KU/L (ref 0–0.34)
MUCOR RACEMOSUS: <0.1 KU/L (ref 0–0.34)
P. NOTATUM: <0.1 KU/L (ref 0–0.34)
RUSSIAN THISTLE: <0.1 KU/L (ref 0–0.34)
SHORT RAGWD(A ARTEMIS.) IGE: 6.55 KU/L (ref 0–0.34)
TIMOTHY GRASS: 0.39 KU/L (ref 0–0.34)

## 2023-05-18 ENCOUNTER — TELEPHONE (OUTPATIENT)
Dept: PULMONOLOGY | Age: 73
End: 2023-05-18

## 2023-05-18 RX ORDER — AZITHROMYCIN 250 MG/1
250 TABLET, FILM COATED ORAL SEE ADMIN INSTRUCTIONS
Qty: 6 TABLET | Refills: 0 | Status: SHIPPED | OUTPATIENT
Start: 2023-05-18 | End: 2023-05-23

## 2023-05-18 RX ORDER — PREDNISONE 20 MG/1
40 TABLET ORAL DAILY
Qty: 10 TABLET | Refills: 0 | Status: SHIPPED | OUTPATIENT
Start: 2023-05-18 | End: 2023-05-23

## 2023-05-18 NOTE — TELEPHONE ENCOUNTER
Patient normally sees Dr. John Bauman but she stated that if she had any issues that you wanted her to contact you. I tried to get her in to see you but I couldn't get her in until the 30th and patient doesn't think she can wait that long. She is having an asthma flare up and is coughing horribly and would like something called into pharmacy. Please advise.   I will message Dr. John Bauman as well

## 2023-05-19 ENCOUNTER — TELEPHONE (OUTPATIENT)
Dept: PULMONOLOGY | Age: 73
End: 2023-05-19

## 2023-05-19 RX ORDER — AMOXICILLIN AND CLAVULANATE POTASSIUM 875; 125 MG/1; MG/1
1 TABLET, FILM COATED ORAL 2 TIMES DAILY
Qty: 14 TABLET | Refills: 0 | Status: SHIPPED | OUTPATIENT
Start: 2023-05-19 | End: 2023-05-26

## 2023-05-19 NOTE — TELEPHONE ENCOUNTER
Spoke with patient and informed. I explained that she could  the prednisone and take if she wanted try it due to it not being a tapered dose again.   She will for sure be picking up antibiotic and will call back Mon if not any better

## 2023-05-19 NOTE — TELEPHONE ENCOUNTER
Spoke with patient to inform but she stated that she just got done taking a Z-pac and prednisone and it didn't help her at all and also caused her BP to go up. She would like something different called in if possible. She is coughing and wheezing pretty bad.   Please advise

## 2023-06-01 ENCOUNTER — HOSPITAL ENCOUNTER (OUTPATIENT)
Age: 73
Setting detail: SPECIMEN
Discharge: HOME | End: 2023-06-01

## 2023-06-01 DIAGNOSIS — E78.2 MIXED HYPERLIPIDEMIA: ICD-10-CM

## 2023-06-01 DIAGNOSIS — I10 ESSENTIAL HYPERTENSION: ICD-10-CM

## 2023-06-01 DIAGNOSIS — E11.9 TYPE 2 DIABETES MELLITUS WITHOUT COMPLICATION, WITHOUT LONG-TERM CURRENT USE OF INSULIN (HCC): ICD-10-CM

## 2023-06-01 LAB
ALBUMIN SERPL-MCNC: 3.9 G/DL (ref 3.5–5.2)
ALBUMIN/GLOB SERPL: 1.3 {RATIO} (ref 1–2.5)
ALP SERPL-CCNC: 93 U/L (ref 35–104)
ALT SERPL-CCNC: 14 U/L (ref 5–33)
ANION GAP SERPL CALCULATED.3IONS-SCNC: 14 MMOL/L (ref 9–17)
AST SERPL-CCNC: 16 U/L
BILIRUB SERPL-MCNC: 0.9 MG/DL (ref 0.3–1.2)
BUN SERPL-MCNC: 17 MG/DL (ref 8–23)
CALCIUM SERPL-MCNC: 9.9 MG/DL (ref 8.6–10.4)
CHLORIDE SERPL-SCNC: 104 MMOL/L (ref 98–107)
CHOLEST SERPL-MCNC: 176 MG/DL
CHOLESTEROL/HDL RATIO: 2.9
CO2 SERPL-SCNC: 23 MMOL/L (ref 20–31)
CREAT SERPL-MCNC: 0.69 MG/DL (ref 0.5–0.9)
ERYTHROCYTE [DISTWIDTH] IN BLOOD BY AUTOMATED COUNT: 13 % (ref 11.8–14.4)
GFR SERPL CREATININE-BSD FRML MDRD: >60 ML/MIN/1.73M2
GLUCOSE SERPL-MCNC: 138 MG/DL (ref 70–99)
HCT VFR BLD AUTO: 44.6 % (ref 36.3–47.1)
HDLC SERPL-MCNC: 60 MG/DL
HGB BLD-MCNC: 14.4 G/DL (ref 11.9–15.1)
LDLC SERPL CALC-MCNC: 100 MG/DL (ref 0–130)
MCH RBC QN AUTO: 29.4 PG (ref 25.2–33.5)
MCHC RBC AUTO-ENTMCNC: 32.3 G/DL (ref 28.4–34.8)
MCV RBC AUTO: 91.2 FL (ref 82.6–102.9)
NRBC AUTOMATED: 0 PER 100 WBC
PLATELET # BLD AUTO: 250 K/UL (ref 138–453)
PMV BLD AUTO: 11.2 FL (ref 8.1–13.5)
POTASSIUM SERPL-SCNC: 4.5 MMOL/L (ref 3.7–5.3)
PROT SERPL-MCNC: 6.9 G/DL (ref 6.4–8.3)
RBC # BLD AUTO: 4.89 M/UL (ref 3.95–5.11)
SODIUM SERPL-SCNC: 141 MMOL/L (ref 135–144)
TRIGL SERPL-MCNC: 81 MG/DL
WBC OTHER # BLD: 5.5 K/UL (ref 3.5–11.3)

## 2023-09-06 ENCOUNTER — OFFICE VISIT (OUTPATIENT)
Dept: PODIATRY | Age: 73
End: 2023-09-06
Payer: MEDICARE

## 2023-09-06 VITALS — HEIGHT: 61 IN | WEIGHT: 159 LBS | BODY MASS INDEX: 30.02 KG/M2

## 2023-09-06 DIAGNOSIS — Z98.890 HISTORY OF FOOT SURGERY: Primary | ICD-10-CM

## 2023-09-06 DIAGNOSIS — M77.42 METATARSALGIA, LEFT FOOT: ICD-10-CM

## 2023-09-06 DIAGNOSIS — M77.41 METATARSALGIA, RIGHT FOOT: ICD-10-CM

## 2023-09-06 DIAGNOSIS — M79.671 FOOT PAIN, RIGHT: ICD-10-CM

## 2023-09-06 DIAGNOSIS — M79.672 FOOT PAIN, LEFT: ICD-10-CM

## 2023-09-06 PROCEDURE — 1123F ACP DISCUSS/DSCN MKR DOCD: CPT | Performed by: PODIATRIST

## 2023-09-06 PROCEDURE — 3017F COLORECTAL CA SCREEN DOC REV: CPT | Performed by: PODIATRIST

## 2023-09-06 PROCEDURE — G8417 CALC BMI ABV UP PARAM F/U: HCPCS | Performed by: PODIATRIST

## 2023-09-06 PROCEDURE — 1090F PRES/ABSN URINE INCON ASSESS: CPT | Performed by: PODIATRIST

## 2023-09-06 PROCEDURE — G8400 PT W/DXA NO RESULTS DOC: HCPCS | Performed by: PODIATRIST

## 2023-09-06 PROCEDURE — 1036F TOBACCO NON-USER: CPT | Performed by: PODIATRIST

## 2023-09-06 PROCEDURE — G8427 DOCREV CUR MEDS BY ELIG CLIN: HCPCS | Performed by: PODIATRIST

## 2023-09-06 PROCEDURE — 99213 OFFICE O/P EST LOW 20 MIN: CPT | Performed by: PODIATRIST

## 2023-09-06 NOTE — PROGRESS NOTES
UNIT/GM powder Apply 3 times daily. 3/13/23  Yes Dieudonne Alfaro MD   metFORMIN (GLUCOPHAGE-XR) 500 MG extended release tablet TAKE 1 TABLET BY MOUTH EVERY DAY WITH BREAKFAST 3/3/23  Yes VIBHA Garcia CNP   blood glucose test strips (TRUE METRIX BLOOD GLUCOSE TEST) strip TEST 1 TIME DAILY FOR SYMPTOMS OF IRREGULAR BLOOD SUGAR 2/24/23  Yes VIBHA Garcia CNP   balsalazide (COLAZAL) 750 MG capsule TAKE 2 CAPSULES BY MOUTH TWICE A DAY 12/15/22  Yes VIBHA Ryan NP   Handicap Placard MISC by Does not apply route Expires 6/23/26. 6/23/22  Yes Dieudonne Alfaro MD   metoprolol succinate (TOPROL XL) 50 MG extended release tablet TAKE 1 TABLET BY MOUTH EVERY DAY 3/12/20  Yes Historical Provider, MD   TRUEPLUS LANCETS 30G MISC Test 1 times a day for symptoms of irregular blood glucose.  1/31/19  Yes Dieudonne Alfaro MD   meclizine (ANTIVERT) 25 MG tablet TAKE 1 TABLET BY MOUTH THREE TIMES A DAY AS NEEDED FOR NAUSEA or dizziness  9/10/18  Yes Dieudonne Alfaro MD   promethazine (PHENERGAN) 25 MG tablet TAKE 1/2 TABLET BY MOUTH EVERY SIX HOURS AS NEEDED FOR NAUSEA 8/27/18  Yes Dieudonne Alfaro MD   ondansetron (ZOFRAN-ODT) 4 MG disintegrating tablet DISSOLVE 1 TABLET IN MOUTH EVERY EIGHT HOURS AS NEEDED FOR NAUSEA AND VOMITING 8/20/18  Yes VIBHA Ponce CNP   Blood Glucose Monitoring Suppl SU Inject 1 each into the skin daily and prn for diabetes  Patient taking differently: Inject 1 kit into the skin daily for diabetes 6/25/18  Yes VIBHA Ponce CNP   Loratadine (CLARITIN PO) Take by mouth daily    Yes Historical Provider, MD   albuterol sulfate HFA (PROVENTIL;VENTOLIN;PROAIR) 108 (90 Base) MCG/ACT inhaler Inhale 2 puffs into the lungs every 6 hours as needed for Wheezing or Shortness of Breath  Patient not taking: Reported on 6/1/2023 5/9/23   [place] VIBHA Holden CNP   montelukast (SINGULAIR) 10 MG tablet Take 1 tablet by mouth nightly 3/28/23 6/1/23  Becki James MD

## 2023-10-03 ENCOUNTER — OFFICE VISIT (OUTPATIENT)
Dept: PULMONOLOGY | Age: 73
End: 2023-10-03
Payer: MEDICARE

## 2023-10-03 VITALS
DIASTOLIC BLOOD PRESSURE: 82 MMHG | OXYGEN SATURATION: 98 % | HEIGHT: 61 IN | BODY MASS INDEX: 30.02 KG/M2 | WEIGHT: 159 LBS | RESPIRATION RATE: 16 BRPM | HEART RATE: 67 BPM | SYSTOLIC BLOOD PRESSURE: 137 MMHG

## 2023-10-03 DIAGNOSIS — R09.82 POST-NASAL DRIP: ICD-10-CM

## 2023-10-03 DIAGNOSIS — J30.81 ALLERGY TO CATS: ICD-10-CM

## 2023-10-03 DIAGNOSIS — J30.1 CHRONIC SEASONAL ALLERGIC RHINITIS DUE TO POLLEN: ICD-10-CM

## 2023-10-03 DIAGNOSIS — J45.40 MODERATE PERSISTENT ASTHMA WITHOUT COMPLICATION: Primary | ICD-10-CM

## 2023-10-03 DIAGNOSIS — Z86.16 HISTORY OF COVID-19: ICD-10-CM

## 2023-10-03 DIAGNOSIS — K21.9 GASTROESOPHAGEAL REFLUX DISEASE, UNSPECIFIED WHETHER ESOPHAGITIS PRESENT: ICD-10-CM

## 2023-10-03 DIAGNOSIS — E66.09 OBESITY DUE TO EXCESS CALORIES, UNSPECIFIED OBESITY SEVERITY: ICD-10-CM

## 2023-10-03 DIAGNOSIS — J32.8 OTHER CHRONIC SINUSITIS: ICD-10-CM

## 2023-10-03 PROCEDURE — 1036F TOBACCO NON-USER: CPT | Performed by: INTERNAL MEDICINE

## 2023-10-03 PROCEDURE — 3074F SYST BP LT 130 MM HG: CPT | Performed by: INTERNAL MEDICINE

## 2023-10-03 PROCEDURE — G8427 DOCREV CUR MEDS BY ELIG CLIN: HCPCS | Performed by: INTERNAL MEDICINE

## 2023-10-03 PROCEDURE — G8417 CALC BMI ABV UP PARAM F/U: HCPCS | Performed by: INTERNAL MEDICINE

## 2023-10-03 PROCEDURE — 3078F DIAST BP <80 MM HG: CPT | Performed by: INTERNAL MEDICINE

## 2023-10-03 PROCEDURE — 1090F PRES/ABSN URINE INCON ASSESS: CPT | Performed by: INTERNAL MEDICINE

## 2023-10-03 PROCEDURE — G8400 PT W/DXA NO RESULTS DOC: HCPCS | Performed by: INTERNAL MEDICINE

## 2023-10-03 PROCEDURE — 1123F ACP DISCUSS/DSCN MKR DOCD: CPT | Performed by: INTERNAL MEDICINE

## 2023-10-03 PROCEDURE — G8484 FLU IMMUNIZE NO ADMIN: HCPCS | Performed by: INTERNAL MEDICINE

## 2023-10-03 PROCEDURE — 3017F COLORECTAL CA SCREEN DOC REV: CPT | Performed by: INTERNAL MEDICINE

## 2023-10-03 PROCEDURE — 99214 OFFICE O/P EST MOD 30 MIN: CPT | Performed by: INTERNAL MEDICINE

## 2023-10-03 RX ORDER — FLUTICASONE FUROATE AND VILANTEROL 200; 25 UG/1; UG/1
1 POWDER RESPIRATORY (INHALATION) DAILY
Qty: 1 EACH | Refills: 5 | Status: SHIPPED | OUTPATIENT
Start: 2023-10-03

## 2023-10-03 RX ORDER — MONTELUKAST SODIUM 10 MG/1
10 TABLET ORAL NIGHTLY
Qty: 90 TABLET | Refills: 1 | Status: SHIPPED | OUTPATIENT
Start: 2023-10-03 | End: 2023-11-02

## 2023-10-03 NOTE — PROGRESS NOTES
annually. Recommendations given regarding pneumococcal vaccinations. Had the COVID-19 vaccination including the Bi Valent booster  Patient is up-to-date with vaccinations from pulmonary perspective. Maintain an active lifestyle. Patient was educated on how to use the respiratory medications. All the questions that the patient  has had were answered to her satisfaction  Home O2 evaluation to be done. Supplemental oxygen was not needed. After reviewing the patient's smoking history and her age patient does not meet the criteria for lung cancer screening as the pt never smoked. We'll see the patient back in 6 months or earlier if needed  She will call us if she is sick and need to be seen sooner  Thank you for having us involved in the care of your patient. Please call us if you have any questions or concerns.         Felix Nair MD, MD             10/7/2023, 12:53 PM

## 2023-12-04 ENCOUNTER — HOSPITAL ENCOUNTER (OUTPATIENT)
Age: 73
Setting detail: SPECIMEN
Discharge: HOME | End: 2023-12-04

## 2023-12-04 DIAGNOSIS — E11.9 TYPE 2 DIABETES MELLITUS WITHOUT COMPLICATION, WITHOUT LONG-TERM CURRENT USE OF INSULIN (HCC): ICD-10-CM

## 2023-12-04 LAB
CREAT UR-MCNC: 83.6 MG/DL (ref 28–217)
MICROALBUMIN UR-MCNC: <12 MG/L
MICROALBUMIN/CREAT UR-RTO: NORMAL MCG/MG CREAT

## 2024-03-06 ENCOUNTER — TELEPHONE (OUTPATIENT)
Dept: PULMONOLOGY | Age: 74
End: 2024-03-06

## 2024-03-06 DIAGNOSIS — J45.30 MILD PERSISTENT ASTHMA WITHOUT COMPLICATION: ICD-10-CM

## 2024-03-06 RX ORDER — MONTELUKAST SODIUM 10 MG/1
10 TABLET ORAL NIGHTLY
Qty: 90 TABLET | Refills: 1 | OUTPATIENT
Start: 2024-03-06

## 2024-03-06 NOTE — TELEPHONE ENCOUNTER
Patient stopped into the office and wanted a sample of breo and singulair she is on due to her leaving them a the hotel when they were on vacation. I looked into patients chart and she is on sigulair tablets that we will not have samples of but is there anything that you would be ok with her having a sample of that we do have until she is able to refill scripts through pharmacy.  Please advise

## 2024-03-07 NOTE — TELEPHONE ENCOUNTER
What about the breo  Could we give her trelegy for now?  Insurance wont pay for another script of singulair until next script is ready to be filled unless there was a change to the medication.  Please advise

## 2024-03-11 NOTE — TELEPHONE ENCOUNTER
Spoke with patient and she was able to get her pharmacy to be able to get medications approved and shipped over night.  Patient stated she is fine

## 2024-03-29 ENCOUNTER — OFFICE VISIT (OUTPATIENT)
Dept: GASTROENTEROLOGY | Age: 74
End: 2024-03-29
Payer: MEDICARE

## 2024-03-29 VITALS
HEIGHT: 60 IN | DIASTOLIC BLOOD PRESSURE: 92 MMHG | SYSTOLIC BLOOD PRESSURE: 149 MMHG | HEART RATE: 53 BPM | WEIGHT: 152.8 LBS | BODY MASS INDEX: 30 KG/M2

## 2024-03-29 DIAGNOSIS — K51.00 ULCERATIVE PANCOLITIS WITHOUT COMPLICATION (HCC): Primary | ICD-10-CM

## 2024-03-29 PROCEDURE — 3080F DIAST BP >= 90 MM HG: CPT | Performed by: NURSE PRACTITIONER

## 2024-03-29 PROCEDURE — G8484 FLU IMMUNIZE NO ADMIN: HCPCS | Performed by: NURSE PRACTITIONER

## 2024-03-29 PROCEDURE — 99214 OFFICE O/P EST MOD 30 MIN: CPT | Performed by: NURSE PRACTITIONER

## 2024-03-29 PROCEDURE — 1090F PRES/ABSN URINE INCON ASSESS: CPT | Performed by: NURSE PRACTITIONER

## 2024-03-29 PROCEDURE — G8400 PT W/DXA NO RESULTS DOC: HCPCS | Performed by: NURSE PRACTITIONER

## 2024-03-29 PROCEDURE — G8427 DOCREV CUR MEDS BY ELIG CLIN: HCPCS | Performed by: NURSE PRACTITIONER

## 2024-03-29 PROCEDURE — 3017F COLORECTAL CA SCREEN DOC REV: CPT | Performed by: NURSE PRACTITIONER

## 2024-03-29 PROCEDURE — 3077F SYST BP >= 140 MM HG: CPT | Performed by: NURSE PRACTITIONER

## 2024-03-29 PROCEDURE — 1036F TOBACCO NON-USER: CPT | Performed by: NURSE PRACTITIONER

## 2024-03-29 PROCEDURE — G8417 CALC BMI ABV UP PARAM F/U: HCPCS | Performed by: NURSE PRACTITIONER

## 2024-03-29 PROCEDURE — 1123F ACP DISCUSS/DSCN MKR DOCD: CPT | Performed by: NURSE PRACTITIONER

## 2024-03-29 RX ORDER — BALSALAZIDE DISODIUM 750 MG/1
1500 CAPSULE ORAL DAILY
Qty: 180 CAPSULE | Refills: 3 | Status: SHIPPED | OUTPATIENT
Start: 2024-03-29 | End: 2024-03-29

## 2024-03-29 RX ORDER — BALSALAZIDE DISODIUM 750 MG/1
1500 CAPSULE ORAL 2 TIMES DAILY
Qty: 360 CAPSULE | Refills: 3 | Status: SHIPPED | OUTPATIENT
Start: 2024-03-29 | End: 2025-03-24

## 2024-03-29 ASSESSMENT — ENCOUNTER SYMPTOMS
BACK PAIN: 0
RECTAL PAIN: 0
ABDOMINAL PAIN: 0
VOICE CHANGE: 0
WHEEZING: 0
COUGH: 0
CONSTIPATION: 0
TROUBLE SWALLOWING: 0
BLOOD IN STOOL: 0
ABDOMINAL DISTENTION: 0
SINUS PRESSURE: 0
SORE THROAT: 0
ANAL BLEEDING: 0
CHOKING: 0
DIARRHEA: 0
VOMITING: 0
NAUSEA: 0

## 2024-03-29 NOTE — PROGRESS NOTES
morning and at bedtime, Disp: 360 capsule, Rfl: 3    Semaglutide,0.25 or 0.5MG/DOS, (OZEMPIC, 0.25 OR 0.5 MG/DOSE,) 2 MG/3ML SOPN, Inject 0.5 mg into the skin once a week, Disp: 3 mL, Rfl: 1    montelukast (SINGULAIR) 10 MG tablet, Take 1 tablet by mouth nightly, Disp: 30 tablet, Rfl: 0    omeprazole (PRILOSEC) 20 MG delayed release capsule, Take 1 capsule by mouth daily, Disp: 90 capsule, Rfl: 2    metFORMIN (GLUCOPHAGE-XR) 500 MG extended release tablet, Take 1 tablet by mouth daily (with breakfast), Disp: 90 tablet, Rfl: 2    fluticasone furoate-vilanterol (BREO ELLIPTA) 200-25 MCG/ACT AEPB inhaler, Inhale 1 puff into the lungs daily, Disp: 1 each, Rfl: 5    albuterol sulfate HFA (PROAIR HFA) 108 (90 Base) MCG/ACT inhaler, Inhale 2 puffs into the lungs every 6 hours as needed for Wheezing, Disp: 1 each, Rfl: 5    fluticasone (FLONASE) 50 MCG/ACT nasal spray, SPRAY 2 SPRAYS BY NASAL ROUTE EVERY DAY, Disp: 1 each, Rfl: 5    atorvastatin (LIPITOR) 10 MG tablet, TAKE 1 TABLET BY MOUTH EVERY DAY, Disp: 90 tablet, Rfl: 0    blood glucose test strips (TRUE METRIX BLOOD GLUCOSE TEST) strip, TEST 1 TIME DAILY FOR SYMPTOMS OF IRREGULAR BLOOD SUGAR, Disp: 100 strip, Rfl: 4    metoprolol succinate (TOPROL XL) 50 MG extended release tablet, TAKE 1 TABLET BY MOUTH EVERY DAY, Disp: , Rfl:     TRUEPLUS LANCETS 30G MISC, Test 1 times a day for symptoms of irregular blood glucose., Disp: 100 each, Rfl: 3    meclizine (ANTIVERT) 25 MG tablet, TAKE 1 TABLET BY MOUTH THREE TIMES A DAY AS NEEDED FOR NAUSEA or dizziness , Disp: 60 tablet, Rfl: 1    promethazine (PHENERGAN) 25 MG tablet, TAKE 1/2 TABLET BY MOUTH EVERY SIX HOURS AS NEEDED FOR NAUSEA, Disp: 30 tablet, Rfl: 0    ondansetron (ZOFRAN-ODT) 4 MG disintegrating tablet, DISSOLVE 1 TABLET IN MOUTH EVERY EIGHT HOURS AS NEEDED FOR NAUSEA AND VOMITING, Disp: 30 tablet, Rfl: 0    Loratadine (CLARITIN PO), Take by mouth daily , Disp: , Rfl:     ALLERGIES:   Allergies   Allergen

## 2024-04-02 DIAGNOSIS — K51.00 ULCERATIVE PANCOLITIS WITHOUT COMPLICATION (HCC): Primary | ICD-10-CM

## 2024-04-02 RX ORDER — BISACODYL 5 MG/1
TABLET, DELAYED RELEASE ORAL
Qty: 4 TABLET | Refills: 0 | Status: SHIPPED | OUTPATIENT
Start: 2024-04-02

## 2024-04-02 RX ORDER — POLYETHYLENE GLYCOL 3350 17 G/17G
POWDER, FOR SOLUTION ORAL
Qty: 238 G | Refills: 0 | Status: SHIPPED | OUTPATIENT
Start: 2024-04-02

## 2024-04-02 NOTE — TELEPHONE ENCOUNTER
Procedure schedule/Dr Rdz  Procedure: colon   Dx: ulcerative colitis   Date: 4/12/24   Time: 845am  Hospital: [facility] Phone call: TBD  Bowel Prep given: miralax/dulc   Advised in office/phone: office  Clearance needed: none

## 2024-04-04 ENCOUNTER — HOSPITAL ENCOUNTER (OUTPATIENT)
Age: 74
Setting detail: SPECIMEN
Discharge: HOME | End: 2024-04-04

## 2024-04-04 DIAGNOSIS — K51.00 ULCERATIVE PANCOLITIS WITHOUT COMPLICATION (HCC): ICD-10-CM

## 2024-04-04 LAB
ALBUMIN SERPL-MCNC: 4.5 G/DL (ref 3.5–5.2)
ALBUMIN/GLOB SERPL: 2 {RATIO} (ref 1–2.5)
ALP SERPL-CCNC: 80 U/L (ref 35–104)
ALT SERPL-CCNC: 13 U/L (ref 10–35)
AST SERPL-CCNC: 20 U/L (ref 10–35)
BILIRUB DIRECT SERPL-MCNC: 0.2 MG/DL (ref 0–0.3)
BILIRUB INDIRECT SERPL-MCNC: 0.6 MG/DL (ref 0–1)
BILIRUB SERPL-MCNC: 0.8 MG/DL (ref 0–1.2)
GLOBULIN SER CALC-MCNC: 2.8 G/DL
PROT SERPL-MCNC: 7.3 G/DL (ref 6.6–8.7)

## 2024-04-11 ENCOUNTER — ANESTHESIA EVENT (OUTPATIENT)
Dept: ENDOSCOPY | Age: 74
End: 2024-04-11
Payer: MEDICARE

## 2024-04-11 ENCOUNTER — HOSPITAL ENCOUNTER (OUTPATIENT)
Dept: PREADMISSION TESTING | Age: 74
Setting detail: OUTPATIENT SURGERY
End: 2024-04-11
Payer: MEDICARE

## 2024-04-11 VITALS — HEIGHT: 61 IN | BODY MASS INDEX: 28.7 KG/M2 | WEIGHT: 152 LBS

## 2024-04-11 NOTE — PROGRESS NOTES
Pre-op Instructions For Out-Patient Endoscopy Surgery    Medication Instructions:  Please stop herbs and any supplements now (includes vitamins and minerals).    Please contact your surgeon and prescribing physician for pre-op instructions for any blood thinners.    If you have inhalers/aerosol treatments at home, please use them the morning of your surgery and bring the inhalers with you to the hospital.  Will use inhalers  Please take the following medications the morning of your surgery with a sip of water:    metoprolol    Surgery Instructions:  After midnight before surgery:  Do not eat or drink anything, including water, mints, gum, and hard candy.  You may brush your teeth without swallowing.  No smoking, chewing tobacco, or street drugs.    Please shower or bathe before surgery.       Please do not wear any cologne, lotion, powder, jewelry, piercings, perfume, makeup, nail polish, hair accessories, or hair spray on the day of surgery.  Wear loose comfortable clothing.    Leave your valuables at home but bring a payment source for any after-surgery prescriptions you plan to fill at [facility].  Bring a storage case for any glasses/contacts.    An adult who is responsible for you MUST drive you home and should be with you for the first 24 hours after surgery.     The Day of Surgery:  Arrive at [facility] Entr[facility] at the time directed by your surgeon and check in at the desk.     If you have a living will or healthcare power of , please bring a copy.    You will be taken to the pre-op holding area where you will be prepared for surgery.  A physical assessment will be performed by a nurse practitioner or house officer.  Your IV will be started and you will meet your anesthesiologist.    When you go to surgery, your family will be directed to the surgical waiting room, where the doctor should speak with them after your surgery.    After surgery, you will be taken to the

## 2024-04-12 ENCOUNTER — ANESTHESIA (OUTPATIENT)
Dept: ENDOSCOPY | Age: 74
End: 2024-04-12
Payer: MEDICARE

## 2024-04-12 ENCOUNTER — HOSPITAL ENCOUNTER (OUTPATIENT)
Age: 74
Setting detail: OUTPATIENT SURGERY
Discharge: HOME | End: 2024-04-12
Attending: INTERNAL MEDICINE | Admitting: INTERNAL MEDICINE
Payer: MEDICARE

## 2024-04-12 VITALS
BODY MASS INDEX: 28.7 KG/M2 | SYSTOLIC BLOOD PRESSURE: 139 MMHG | HEART RATE: 75 BPM | WEIGHT: 152 LBS | RESPIRATION RATE: 11 BRPM | OXYGEN SATURATION: 96 % | TEMPERATURE: 97.7 F | HEIGHT: 61 IN | DIASTOLIC BLOOD PRESSURE: 80 MMHG

## 2024-04-12 DIAGNOSIS — K51.919 ULCERATIVE COLITIS WITH COMPLICATION, UNSPECIFIED LOCATION (HCC): ICD-10-CM

## 2024-04-12 LAB — GLUCOSE BLD-MCNC: 111 MG/DL (ref 65–105)

## 2024-04-12 PROCEDURE — 2500000003 HC RX 250 WO HCPCS: Performed by: NURSE ANESTHETIST, CERTIFIED REGISTERED

## 2024-04-12 PROCEDURE — 2580000003 HC RX 258: Performed by: ANESTHESIOLOGY

## 2024-04-12 PROCEDURE — 3609010300 HC COLONOSCOPY W/BIOPSY SINGLE/MULTIPLE: Performed by: INTERNAL MEDICINE

## 2024-04-12 PROCEDURE — 6360000002 HC RX W HCPCS: Performed by: NURSE ANESTHETIST, CERTIFIED REGISTERED

## 2024-04-12 PROCEDURE — 2709999900 HC NON-CHARGEABLE SUPPLY: Performed by: INTERNAL MEDICINE

## 2024-04-12 PROCEDURE — 3700000001 HC ADD 15 MINUTES (ANESTHESIA): Performed by: INTERNAL MEDICINE

## 2024-04-12 PROCEDURE — 45380 COLONOSCOPY AND BIOPSY: CPT | Performed by: INTERNAL MEDICINE

## 2024-04-12 PROCEDURE — 82947 ASSAY GLUCOSE BLOOD QUANT: CPT

## 2024-04-12 PROCEDURE — 7100000010 HC PHASE II RECOVERY - FIRST 15 MIN: Performed by: INTERNAL MEDICINE

## 2024-04-12 PROCEDURE — 3700000000 HC ANESTHESIA ATTENDED CARE: Performed by: INTERNAL MEDICINE

## 2024-04-12 PROCEDURE — 88305 TISSUE EXAM BY PATHOLOGIST: CPT

## 2024-04-12 PROCEDURE — 7100000011 HC PHASE II RECOVERY - ADDTL 15 MIN: Performed by: INTERNAL MEDICINE

## 2024-04-12 RX ORDER — LIDOCAINE HYDROCHLORIDE 10 MG/ML
1 INJECTION, SOLUTION EPIDURAL; INFILTRATION; INTRACAUDAL; PERINEURAL
Status: DISCONTINUED | OUTPATIENT
Start: 2024-04-12 | End: 2024-04-12 | Stop reason: HOSPADM

## 2024-04-12 RX ORDER — SODIUM CHLORIDE 0.9 % (FLUSH) 0.9 %
5-40 SYRINGE (ML) INJECTION PRN
Status: DISCONTINUED | OUTPATIENT
Start: 2024-04-12 | End: 2024-04-12 | Stop reason: HOSPADM

## 2024-04-12 RX ORDER — SODIUM CHLORIDE 0.9 % (FLUSH) 0.9 %
5-40 SYRINGE (ML) INJECTION EVERY 12 HOURS SCHEDULED
Status: DISCONTINUED | OUTPATIENT
Start: 2024-04-12 | End: 2024-04-12 | Stop reason: HOSPADM

## 2024-04-12 RX ORDER — SODIUM CHLORIDE 9 MG/ML
INJECTION, SOLUTION INTRAVENOUS CONTINUOUS
Status: DISCONTINUED | OUTPATIENT
Start: 2024-04-12 | End: 2024-04-12 | Stop reason: HOSPADM

## 2024-04-12 RX ORDER — PROPOFOL 10 MG/ML
INJECTION, EMULSION INTRAVENOUS PRN
Status: DISCONTINUED | OUTPATIENT
Start: 2024-04-12 | End: 2024-04-12 | Stop reason: SDUPTHER

## 2024-04-12 RX ORDER — LIDOCAINE HYDROCHLORIDE 10 MG/ML
INJECTION, SOLUTION EPIDURAL; INFILTRATION; INTRACAUDAL; PERINEURAL PRN
Status: DISCONTINUED | OUTPATIENT
Start: 2024-04-12 | End: 2024-04-12 | Stop reason: SDUPTHER

## 2024-04-12 RX ORDER — SODIUM CHLORIDE 9 MG/ML
INJECTION, SOLUTION INTRAVENOUS PRN
Status: DISCONTINUED | OUTPATIENT
Start: 2024-04-12 | End: 2024-04-12 | Stop reason: HOSPADM

## 2024-04-12 RX ADMIN — SODIUM CHLORIDE: 9 INJECTION, SOLUTION INTRAVENOUS at 08:10

## 2024-04-12 RX ADMIN — LIDOCAINE HYDROCHLORIDE 40 MG: 10 INJECTION, SOLUTION EPIDURAL; INFILTRATION; INTRACAUDAL; PERINEURAL at 08:55

## 2024-04-12 RX ADMIN — PROPOFOL 120 MCG/KG/MIN: 10 INJECTION, EMULSION INTRAVENOUS at 08:56

## 2024-04-12 RX ADMIN — PROPOFOL 70 MG: 10 INJECTION, EMULSION INTRAVENOUS at 08:55

## 2024-04-12 ASSESSMENT — PAIN - FUNCTIONAL ASSESSMENT
PAIN_FUNCTIONAL_ASSESSMENT: 0-10
PAIN_FUNCTIONAL_ASSESSMENT: NONE - DENIES PAIN

## 2024-04-12 ASSESSMENT — ENCOUNTER SYMPTOMS
ABDOMINAL PAIN: 0
SHORTNESS OF BREATH: 0
SINUS PRESSURE: 0
NAUSEA: 0

## 2024-04-12 NOTE — OP NOTE
COLONOSCOPY    DATE OF PROCEDURE: 4/12/2024    SURGEON: Jam Rdz MD    ASSISTANT: None    PREOPERATIVE DIAGNOSIS: IBD surveillance colonoscopy    POSTOPERATIVE DIAGNOSIS: Small hemorrhoids, diverticulosis.  Colitis appears to be in remission.  May have mild melanosis.    OPERATION: Total colonoscopy random biopsies from the right and transverse colon, also from sigmoid colon    ANESTHESIA: MAC    ESTIMATED BLOOD LOSS: None    COMPLICATIONS: None     SPECIMENS:  Was Obtained: Biopsies from the right and transverse colon and sigmoid colon to evaluate IBD in remission.  Rule out melanosis coli    HISTORY: The patient is a 74 y.o. year old female with history of above preop diagnosis.  I recommended colonoscopy with possible biopsy or polypectomy and I explained the risk, benefits, expected outcome, and alternatives to the procedure.  Risks included but are not limited to bleeding, infection, respiratory distress, hypotension, and perforation of the colon and possibility of missing a lesion.  The patient understands and is in agreement.      PROCEDURE:  The patient's SPO2 remained above 90% throughout the procedure. Digital rectal exam was normal.  The colonoscope was inserted through the anus into the rectum and advanced under direct vision to the cecum without difficulty.  Terminal ileum was examined for approximately 2 inches.  The prep was good.      Findings:  Terminal ileum: normal    Cecum/Ascending colon: normal, also examined in the retroflexed review    Transverse colon: normal    Descending/Sigmoid colon: normal, has diverticulosis.  No stricture.  No signs of diverticulitis.    Rectum/Anus: examined in normal and retroflexed positions and was normal, may have small hemorrhoids.  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  The patient tolerated the procedure  without unusual events.     During the procedure, the patient's blood pressure, pulse and oxygen saturation remained stable and documented. No unusual events occurred during the procedure. Patient was transferred to recovery room and will be discharged when criteria is met.     Recommendations/Plan:   F/U Biopsies  F/U In Office as instructed  Discussed with the family  High fiber diet   Precautions to avoid constipation     Next colonoscopy: 5 years.  If Colonoscopy is less than 10 years the recommended reason is due: IBD surveillance    Electronically signed by Jam Rdz MD  on 4/12/2024 at 9:18 AM

## 2024-04-12 NOTE — ANESTHESIA POSTPROCEDURE EVALUATION
Department of Anesthesiology  Postprocedure Note    Patient: Patsy Ordonez  MRN: [id number]  YOB: 1950  Date of evaluation: 4/12/2024    Procedure Summary       Date: 04/12/24 Room / Location: [place] [address] / [facility]    Anesthesia Start: 0846 Anesthesia Stop: 0918    Procedure: COLONOSCOPY BIOPSY OF RIGHT COLON AND TRANSVERSE COLON AND SIGMOID COLON (Rectum) Diagnosis:       Ulcerative colitis with complication, unspecified location (HCC)      (Ulcerative colitis with complication, unspecified location (HCC) [K51.919])    Surgeons: Jam Rdz MD Responsible Provider: Thomas Grady MD    Anesthesia Type: TIVA, MAC ASA Status: 3            Anesthesia Type: TIVA, MAC    Lalit Phase I: Lalit Score: 10    Lalit Phase II: Lalit Score: 10    Anesthesia Post Evaluation    Comments: POST- ANESTHESIA EVALUATION       Pt Name: Patsy Ordonez  MRN: [id number]  YOB: 1950  Date of evaluation: 4/12/2024  Time:  2:25 PM      /80   Pulse 75   Temp 97.7 °F (36.5 °C)   Resp 11   Ht 1.549 m (5' 1\")   Wt 68.9 kg (152 lb)   SpO2 96%   BMI 28.72 kg/m²      Consciousness Level  Awake  Cardiopulmonary Status  Stable  Pain Adequately Treated YES  Nausea / Vomiting  NO  Adequate Hydration  YES  Anesthesia Related Complications NONE      Electronically signed by Thomas Grady MD on 4/12/2024 at 2:25 PM           No notable events documented.

## 2024-04-12 NOTE — H&P
(12/4/2023)    Exercise Vital Sign     Days of Exercise per Week: 2 days     Minutes of Exercise per Session: 20 min   Housing Stability: Unknown (3/13/2023)    Housing Stability Vital Sign     Unstable Housing in the Last Year: No           REVIEW OF SYSTEMS      Allergies   Allergen Reactions    Cat Hair Extract Shortness Of Breath       No current facility-administered medications on file prior to encounter.     Current Outpatient Medications on File Prior to Encounter   Medication Sig Dispense Refill    polyethylene glycol (GLYCOLAX) 17 GM/SCOOP powder Please follow instructions provided to you by your provider. 238 g 0    bisacodyl 5 MG EC tablet Please follow instructions given to you by your provider. 4 tablet 0    balsalazide (COLAZAL) 750 MG capsule Take 2 capsules by mouth in the morning and at bedtime 360 capsule 3    Semaglutide,0.25 or 0.5MG/DOS, (OZEMPIC, 0.25 OR 0.5 MG/DOSE,) 2 MG/3ML SOPN Inject 0.5 mg into the skin once a week 3 mL 1    montelukast (SINGULAIR) 10 MG tablet Take 1 tablet by mouth nightly 30 tablet 0    omeprazole (PRILOSEC) 20 MG delayed release capsule Take 1 capsule by mouth daily 90 capsule 2    metFORMIN (GLUCOPHAGE-XR) 500 MG extended release tablet Take 1 tablet by mouth daily (with breakfast) 90 tablet 2    fluticasone furoate-vilanterol (BREO ELLIPTA) 200-25 MCG/ACT AEPB inhaler Inhale 1 puff into the lungs daily 1 each 5    albuterol sulfate HFA (PROAIR HFA) 108 (90 Base) MCG/ACT inhaler Inhale 2 puffs into the lungs every 6 hours as needed for Wheezing 1 each 5    fluticasone (FLONASE) 50 MCG/ACT nasal spray SPRAY 2 SPRAYS BY NASAL ROUTE EVERY DAY 1 each 5    atorvastatin (LIPITOR) 10 MG tablet TAKE 1 TABLET BY MOUTH EVERY DAY 90 tablet 0    blood glucose test strips (TRUE METRIX BLOOD GLUCOSE TEST) strip TEST 1 TIME DAILY FOR SYMPTOMS OF IRREGULAR BLOOD SUGAR 100 strip 4    metoprolol succinate (TOPROL XL) 50 MG extended release tablet TAKE 1 TABLET BY MOUTH EVERY DAY

## 2024-04-12 NOTE — DISCHARGE INSTRUCTIONS
don't improve with fiber and dietary changes alone your physician may also recommend psyllium or methylcellulose as well.  If your physician has placed you on an antibiotic it is critical that you take the full course of these, even if your symptoms have improved, and that you not miss any doses.    QUESTIONS:  Please feel free to call your physician or the hospital  if you have any questions, and they will be glad to assist you.  If you have further questions it may also be helpful to meet with a dietitician.        High-Fiber Diet     What Is Fiber?   Dietary fiber is a form of carbohydrate found in plants that cannot be digested by humans. All plants contain fiber, including fruits, vegetables, grains, and legumes. Fiber is often classified into two categories: soluble and insoluble.   Soluble fiber draws water into the bowel and can help slow digestion. Examples of foods that are high in soluble fiber include oatmeal, oat bran, barley, legumes (eg, beans and peas), apples, and strawberries.   Insoluble fiber speeds digestion and can add bulk to the stool. Examples of foods that are high in insoluble fiber include whole-wheat products, wheat bran, cauliflower, green beans, and potatoes.   Why Follow a High-Fiber Diet?   A high-fiber diet is often recommended to prevent and treat constipation , hemorrhoids , diverticulitis , and irritable bowel syndrome . Eating a high-fiber diet can also help improve your cholesterol levels, lower your risk of coronary heart disease , reduce your risk of type 2 diabetes , and lower your weight. For people with type 1 or 2 diabetes, a high-fiber diet can also help stabilize blood sugar levels.   How Much Fiber Should I Eat?   A high-fiber diet should contain  20-35 grams  of fiber a day. This is actually the amount recommended for the general adult population; however, most Americans eat only 15 grams of fiber per day.   Digestion of Fiber   Eating a higher fiber diet than  usual can take some getting used to by your body's digestive system. To avoid the side effects of sudden increases in dietary fiber (eg, gas, cramping, bloating, and diarrhea), increase fiber gradually and be sure to drink plenty of fluids every day.   Tips for Increasing Fiber Intake   Whenever possible, choose whole grains over refined grains (eg, brown rice instead of white rice, whole-wheat bread instead of white bread).    Include a variety of grains in your diet, such as wheat, rye, barley, oats, quinoa, and bulgur.    Eat more vegetarian-based meals. Here are some ideas: black bean burgers, eggplant lasagna, and veggie tofu stir-oliveir.    Choose high-fiber snacks, such as fruits, popcorn, whole-grain crackers, and nuts.    Make whole-grain cereal or whole-grain toast part of your daily breakfast regime.    When eating out, whether ordering a sandwich or dinner, ask for extra vegetables.    When baking, replace part of the white flour with whole-wheat flour. Whole-wheat flour is particularly easy to incorporate into a recipe.    High-Fiber Diet Eating Guide   Food Category   Foods Recommended   Notes   Grains   Whole-grain breads, muffins, bagels, or bladimir bread Rye bread Whole-wheat crackers or crisp breads Whole-grain or bran cereals Oatmeal, oat bran, or grits Wheat germ Whole-wheat pasta and brown rice   Read the ingredients list on food labels. Look for products that list \"whole\" as the first ingredient (eg, whole-wheat, whole oats). Choose cereals with at least 2 grams of fiber per serving.   Vegetables   All vegetables, especially asparagus, bean sprouts, broccoli, [place] sprouts, cabbage, carrots, cauliflower, celery, corn, greens, green beans, green pepper, onions, peas, potatoes (with skin), snow peas, spinach, squash, sweet potatoes, tomatoes, zucchini   For maximum fiber intake, eat the peels of fruits and vegetablesjust be sure to wash them well first.   Fruits   All fruits, especially apples,

## 2024-04-12 NOTE — ANESTHESIA PRE PROCEDURE
Department of Anesthesiology  Preprocedure Note       Name:  Patsy Ordonez   Age:  74 y.o.  :  1950                                          MRN:  [id number]         Date:  2024      Surgeon: Surgeon(s):  Jam Rdz MD    Procedure: Procedure(s):  COLONOSCOPY DIAGNOSTIC    Medications prior to admission:   Prior to Admission medications    Medication Sig Start Date End Date Taking? Authorizing Provider   polyethylene glycol (GLYCOLAX) 17 GM/SCOOP powder Please follow instructions provided to you by your provider. 24   Thiago Duggan APRN - NP   bisacodyl 5 MG EC tablet Please follow instructions given to you by your provider. 24   Thiago Duggan APRN - NP   balsalazide (COLAZAL) 750 MG capsule Take 2 capsules by mouth in the morning and at bedtime 3/29/24 3/24/25  Thiago Duggan APRN - NP   Semaglutide,0.25 or 0.5MG/DOS, (OZEMPIC, 0.25 OR 0.5 MG/DOSE,) 2 MG/3ML SOPN Inject 0.5 mg into the skin once a week 3/8/24   Ree Carrasco APRN - CNP   montelukast (SINGULAIR) 10 MG tablet Take 1 tablet by mouth nightly 3/6/24 4/12/24  Darlyn Mercer MD   omeprazole (PRILOSEC) 20 MG delayed release capsule Take 1 capsule by mouth daily 23   Darlyn Mercer MD   metFORMIN (GLUCOPHAGE-XR) 500 MG extended release tablet Take 1 tablet by mouth daily (with breakfast) 23   Darlyn Mercer MD   fluticasone furoate-vilanterol (BREO ELLIPTA) 200-25 MCG/ACT AEPB inhaler Inhale 1 puff into the lungs daily 10/3/23   Que Miles MD   albuterol sulfate HFA (PROAIR HFA) 108 (90 Base) MCG/ACT inhaler Inhale 2 puffs into the lungs every 6 hours as needed for Wheezing 3/28/23   Que Miles MD   fluticasone (FLONASE) 50 MCG/ACT nasal spray SPRAY 2 SPRAYS BY NASAL ROUTE EVERY DAY 3/28/23   Que Miles MD   atorvastatin (LIPITOR) 10 MG tablet TAKE 1 TABLET BY MOUTH EVERY DAY 3/24/23   Kassy Brady APRN - CNP   blood glucose test strips (TRUE

## 2024-04-15 LAB — SURGICAL PATHOLOGY REPORT: NORMAL

## 2024-04-29 ENCOUNTER — OFFICE VISIT (OUTPATIENT)
Dept: GASTROENTEROLOGY | Age: 74
End: 2024-04-29
Payer: MEDICARE

## 2024-04-29 VITALS
SYSTOLIC BLOOD PRESSURE: 131 MMHG | HEART RATE: 93 BPM | HEIGHT: 61 IN | WEIGHT: 147 LBS | DIASTOLIC BLOOD PRESSURE: 81 MMHG | BODY MASS INDEX: 27.75 KG/M2

## 2024-04-29 DIAGNOSIS — K51.80 OTHER ULCERATIVE COLITIS WITHOUT COMPLICATION (HCC): Primary | ICD-10-CM

## 2024-04-29 PROCEDURE — 99213 OFFICE O/P EST LOW 20 MIN: CPT | Performed by: INTERNAL MEDICINE

## 2024-04-29 PROCEDURE — 1123F ACP DISCUSS/DSCN MKR DOCD: CPT | Performed by: INTERNAL MEDICINE

## 2024-04-29 PROCEDURE — G8427 DOCREV CUR MEDS BY ELIG CLIN: HCPCS | Performed by: INTERNAL MEDICINE

## 2024-04-29 PROCEDURE — 1090F PRES/ABSN URINE INCON ASSESS: CPT | Performed by: INTERNAL MEDICINE

## 2024-04-29 PROCEDURE — G8417 CALC BMI ABV UP PARAM F/U: HCPCS | Performed by: INTERNAL MEDICINE

## 2024-04-29 PROCEDURE — 3075F SYST BP GE 130 - 139MM HG: CPT | Performed by: INTERNAL MEDICINE

## 2024-04-29 PROCEDURE — G8400 PT W/DXA NO RESULTS DOC: HCPCS | Performed by: INTERNAL MEDICINE

## 2024-04-29 PROCEDURE — 1036F TOBACCO NON-USER: CPT | Performed by: INTERNAL MEDICINE

## 2024-04-29 PROCEDURE — 3079F DIAST BP 80-89 MM HG: CPT | Performed by: INTERNAL MEDICINE

## 2024-04-29 PROCEDURE — 3017F COLORECTAL CA SCREEN DOC REV: CPT | Performed by: INTERNAL MEDICINE

## 2024-04-29 ASSESSMENT — ENCOUNTER SYMPTOMS
WHEEZING: 0
CHOKING: 0
ANAL BLEEDING: 0
BLOOD IN STOOL: 0
DIARRHEA: 0
COUGH: 0
NAUSEA: 0
ABDOMINAL PAIN: 0
CONSTIPATION: 0
ABDOMINAL DISTENTION: 0
SINUS PRESSURE: 0
RECTAL PAIN: 0
TROUBLE SWALLOWING: 0
VOMITING: 0
SORE THROAT: 0
BACK PAIN: 0
VOICE CHANGE: 0

## 2024-04-29 NOTE — PROGRESS NOTES
GI OFFICE FOLLOW UP    INTERVAL HISTORY:   No referring provider defined for this encounter.    Chief Complaint   Patient presents with    Colonoscopy     Here for Colon F/U. She was due for the colonoscopy and is not having any issues.   History of Colitis and no flare ups recently.        1. Other ulcerative colitis without complication (HCC)              HISTORY OF PRESENT ILLNESS:   Patient seen for follow-up of left-sided ulcerative colitis.  Recent colonoscopy revealed that colitis appears to be in remission.  However basing on the endoscopic appearance and random biopsies histology patient may have mild melanosis coli.  On further discussion patient denies taking laxatives, herbal supplements, nutrition supplements etc.    Patient does not have constipation.  Has normal bowel movements.  No melena hematochezia.  Denies dysphagia dyspepsia.  Good appetite and no weight loss.  Patient does not have symptoms suggestive of extraintestinal manifestation of IBD.  Labs reviewed nonspecific.  Past Medical,Family, and Social History reviewed and does contribute to the patient presenting condition.      Patient's PMH/PSH,SH,PSYCH Hx, MEDs, ALLERGIES, and ROS were all reviewed and updated in the appropriate sections. Yes      PAST MEDICAL HISTORY:  Past Medical History:   Diagnosis Date    Anginal pain (HCC)     hx of angina    Asthma     Bronchitis 2014    Colitis, ulcerative (HCC)     Diabetes mellitus (HCC)     GERD (gastroesophageal reflux disease)     Hypercholesterolemia     Hyperlipidemia     Hypertension, essential, benign 12/04/2017    Meniere disease     Obesity (BMI 30.0-34.9) 05/28/2014    PONV (postoperative nausea and vomiting)     Ulcerative pancolitis without complication (HCC)     Vitamin D deficiency        Past Surgical History:   Procedure Laterality Date    COLONOSCOPY  8/12/2011

## 2024-05-13 DIAGNOSIS — K51.00 ULCERATIVE PANCOLITIS WITHOUT COMPLICATION (HCC): ICD-10-CM

## 2024-06-11 ENCOUNTER — HOSPITAL ENCOUNTER (OUTPATIENT)
Age: 74
Setting detail: SPECIMEN
Discharge: HOME | End: 2024-06-11

## 2024-06-11 LAB
ALBUMIN SERPL-MCNC: 4.4 G/DL (ref 3.5–5.2)
ALT SERPL-CCNC: 10 U/L (ref 10–35)
AST SERPL-CCNC: 20 U/L (ref 10–35)
CHOLEST SERPL-MCNC: 136 MG/DL (ref 0–199)
CHOLESTEROL/HDL RATIO: 2
HDLC SERPL-MCNC: 55 MG/DL
LDLC SERPL CALC-MCNC: 59 MG/DL (ref 0–100)
TRIGL SERPL-MCNC: 109 MG/DL (ref 0–149)
VLDLC SERPL CALC-MCNC: 22 MG/DL

## 2024-07-15 RX ORDER — FLUTICASONE FUROATE AND VILANTEROL TRIFENATATE 200; 25 UG/1; UG/1
POWDER RESPIRATORY (INHALATION)
Qty: 60 EACH | Refills: 1 | Status: SHIPPED | OUTPATIENT
Start: 2024-07-15

## 2024-07-15 NOTE — TELEPHONE ENCOUNTER
Last visit: 10/3/23  Next visit: no follow up scheduled.     Refill request for Breo. Per last dictation, patient continues to take Breo. Please see pended script and advise.

## 2024-08-09 ENCOUNTER — HOSPITAL ENCOUNTER (OUTPATIENT)
Age: 74
Setting detail: SPECIMEN
Discharge: HOME | End: 2024-08-09

## 2024-08-09 LAB
CHOLEST SERPL-MCNC: 141 MG/DL (ref 0–199)
CHOLESTEROL/HDL RATIO: 3
HDLC SERPL-MCNC: 56 MG/DL
LDLC SERPL CALC-MCNC: 66 MG/DL (ref 0–100)
TRIGL SERPL-MCNC: 95 MG/DL (ref 0–149)
VLDLC SERPL CALC-MCNC: 19 MG/DL

## 2024-08-21 NOTE — PROGRESS NOTES
Subjective:      Patient ID: Patsy Ordonez is a 74 y.o. female.     Patient must see physician at next appointment    HPI  Patient here for chronic seasonal allergies, history of asthma,. Last seen in office on 10/3/2023 per       [] Dr. Neal  [] Dr. Wynn  [] Dr. Rosales  [x] Dr. Miles  [] Dr. Wyman  [] Dr. Prado  [] Dr. Zuñiga  [] SHAR Mckeon APRN- CNP      Today's visit: Breathing is good.  Patient states that since starting Breo she has not had any issues over the last year.  Has been able to travel.  Denies any significant cough or shortness of breath      Medications:   Breo 200-25  Montelukast  Albuterol  Flonase      Smoking status:  Cigarettes:  never  Illicit:  never  Vaping:  never    Exposure History:  Employment:  retired   Travel out of country: [place]  Chemical exposure: none  Pets at home: none  Previous Bird/turtle exposure: none  Indoor hot tub/sauna's: none  Family history of lung cancer/lung disease: none  IV drug abuse: none    PRIOR WORKUP:  PFT:  PFT 7/23/2018: Spirometry within normal limits with no improvement postbronchodilator.  Static lung volumes are within normal limits.  DLCO within normal limits with increase in airway resistance.     CT Imaging:    Cardiac Workup:  Echocardiogram: Echocardiogram 7/25/2023: [facility]: LVEF 55 to 60%, mild AV regurgitation no evidence of stenosis.  Moderate mitral valve regurgitation with no evidence of stenosis.  Trace to mild tricuspid valve regurgitation with no evidence of stenosis.  Unable to assess diastolic dysfunction.      Cardiac Cath: none    Sleep Study:  Diagnostic PSG 3/22/2018: Patient had 0 apneas and 15 hypopneas for an AHI for the entire night of 2.5.  AHI was 0.8 during NREM sleep compared to an AHI of 6.7 during REM sleep.  Minimum SPO2 was 88% with 3.8 minutes with an SPO2 of less than 89%.  Patient had 16 leg movements during the night for a PLM index of 2.4 events per hour with 10 arousals associated for a  (Medical): Not on file     Lack of Transportation (Non-Medical): No   Physical Activity: Insufficiently Active (12/4/2023)    Exercise Vital Sign     Days of Exercise per Week: 2 days     Minutes of Exercise per Session: 20 min   Stress: Not on file   Social Connections: Unknown (5/7/2024)    Received from [facility] Short Social Needs Screening - Social Connection     Would you like help with any of the following needs: food, medicine/medical supplies, transportation, loneliness, housing or utilities?: Not on file   Intimate Partner Violence: Not on file   Housing Stability: Unknown (3/13/2023)    Housing Stability Vital Sign     Unable to Pay for Housing in the Last Year: Not on file     Number of Places Lived in the Last Year: Not on file     Unstable Housing in the Last Year: No       Review of Systems   Constitutional:  Negative for fatigue and fever.   HENT:  Negative for postnasal drip, rhinorrhea, sinus pressure and sinus pain.    Respiratory:  Negative for cough, chest tightness, shortness of breath, wheezing and stridor.    Cardiovascular:  Negative for chest pain and leg swelling.   Gastrointestinal:  Negative for constipation, diarrhea, nausea and vomiting.   Genitourinary:  Negative for dysuria, frequency and urgency.   Musculoskeletal:  Negative for arthralgias, joint swelling and myalgias.   Skin:  Negative for rash.   Neurological:  Negative for dizziness, speech difficulty and headaches.   Hematological:  Does not bruise/bleed easily.   Psychiatric/Behavioral:  Negative for agitation, hallucinations, sleep disturbance and suicidal ideas.        Objective:       Physical Exam  General appearance - alert, well appearing, and in no distress  Mental status - alert, oriented to person, place, and time  Eyes - pupils equal and reactive, extraocular eye movements intact  Ears -not examined  Nose - normal and patent, no erythema, discharge or polyps  Mouth - mucous membranes moist, pharynx normal

## 2024-08-27 ENCOUNTER — OFFICE VISIT (OUTPATIENT)
Dept: PULMONOLOGY | Age: 74
End: 2024-08-27
Payer: MEDICARE

## 2024-08-27 VITALS
RESPIRATION RATE: 16 BRPM | BODY MASS INDEX: 25.86 KG/M2 | DIASTOLIC BLOOD PRESSURE: 70 MMHG | HEART RATE: 80 BPM | OXYGEN SATURATION: 98 % | SYSTOLIC BLOOD PRESSURE: 128 MMHG | WEIGHT: 137 LBS | TEMPERATURE: 97 F | HEIGHT: 61 IN

## 2024-08-27 DIAGNOSIS — J45.30 MILD PERSISTENT ASTHMA WITHOUT COMPLICATION: ICD-10-CM

## 2024-08-27 DIAGNOSIS — J30.1 CHRONIC SEASONAL ALLERGIC RHINITIS DUE TO POLLEN: ICD-10-CM

## 2024-08-27 DIAGNOSIS — K21.9 GASTROESOPHAGEAL REFLUX DISEASE, UNSPECIFIED WHETHER ESOPHAGITIS PRESENT: Primary | ICD-10-CM

## 2024-08-27 PROCEDURE — 99214 OFFICE O/P EST MOD 30 MIN: CPT | Performed by: NURSE PRACTITIONER

## 2024-08-27 PROCEDURE — 1123F ACP DISCUSS/DSCN MKR DOCD: CPT | Performed by: NURSE PRACTITIONER

## 2024-08-27 PROCEDURE — G8427 DOCREV CUR MEDS BY ELIG CLIN: HCPCS | Performed by: NURSE PRACTITIONER

## 2024-08-27 PROCEDURE — 3017F COLORECTAL CA SCREEN DOC REV: CPT | Performed by: NURSE PRACTITIONER

## 2024-08-27 PROCEDURE — 3078F DIAST BP <80 MM HG: CPT | Performed by: NURSE PRACTITIONER

## 2024-08-27 PROCEDURE — G8400 PT W/DXA NO RESULTS DOC: HCPCS | Performed by: NURSE PRACTITIONER

## 2024-08-27 PROCEDURE — 1036F TOBACCO NON-USER: CPT | Performed by: NURSE PRACTITIONER

## 2024-08-27 PROCEDURE — 3074F SYST BP LT 130 MM HG: CPT | Performed by: NURSE PRACTITIONER

## 2024-08-27 PROCEDURE — 1090F PRES/ABSN URINE INCON ASSESS: CPT | Performed by: NURSE PRACTITIONER

## 2024-08-27 PROCEDURE — G8417 CALC BMI ABV UP PARAM F/U: HCPCS | Performed by: NURSE PRACTITIONER

## 2024-08-27 RX ORDER — MONTELUKAST SODIUM 10 MG/1
10 TABLET ORAL NIGHTLY
Qty: 90 TABLET | Refills: 3 | Status: SHIPPED | OUTPATIENT
Start: 2024-08-27 | End: 2025-08-22

## 2024-08-27 RX ORDER — ALBUTEROL SULFATE 90 UG/1
2 AEROSOL, METERED RESPIRATORY (INHALATION) EVERY 6 HOURS PRN
Qty: 1 EACH | Refills: 5 | Status: SHIPPED | OUTPATIENT
Start: 2024-08-27

## 2024-08-27 RX ORDER — FLUTICASONE PROPIONATE 50 MCG
SPRAY, SUSPENSION (ML) NASAL
Qty: 3 EACH | Refills: 3 | Status: SHIPPED | OUTPATIENT
Start: 2024-08-27

## 2024-08-27 RX ORDER — FLUTICASONE FUROATE AND VILANTEROL 200; 25 UG/1; UG/1
1 POWDER RESPIRATORY (INHALATION)
Qty: 3 EACH | Refills: 3 | Status: SHIPPED | OUTPATIENT
Start: 2024-08-27

## 2024-08-27 ASSESSMENT — ENCOUNTER SYMPTOMS
CONSTIPATION: 0
COUGH: 0
SHORTNESS OF BREATH: 0
CHEST TIGHTNESS: 0
STRIDOR: 0
DIARRHEA: 0
VOMITING: 0
NAUSEA: 0
WHEEZING: 0
SINUS PAIN: 0
RHINORRHEA: 0
SINUS PRESSURE: 0

## 2024-12-30 ENCOUNTER — HOSPITAL ENCOUNTER (OUTPATIENT)
Dept: PHYSICAL THERAPY | Age: 74
Setting detail: THERAPIES SERIES
Discharge: HOME | End: 2024-12-30
Attending: FAMILY MEDICINE
Payer: MEDICARE

## 2024-12-30 PROCEDURE — 97162 PT EVAL MOD COMPLEX 30 MIN: CPT

## 2024-12-30 PROCEDURE — 97110 THERAPEUTIC EXERCISES: CPT

## 2024-12-30 NOTE — THERAPY EVALUATION
[] [facility]   Outpatient Rehabilitation & Therapy  [address]  P: [phone]   F: [phone]  [facility] Outpatient Physical Therapy  [address]         Phone: [phone]       Fax: [phone]    Physical Therapy Cervical/Thoracic Spine Evaluation    Date:  2024  Patient: Patsy Ordonez  : 1950  MRN: [id number]  Physician: Darlyn Mercer MD     Medical Diagnosis: Back strain, initial encounter (S39.012A)   Clinical Diagnosis: Thoracic Pain (M54.6) with Muscle Weakness (M62.81)  Onset date: 12/10/2024   Next Dr's appt.: TBD  PT Visit Information  PT Insurance Information: Medicare Railroad - follow Medicare guidelines. No Hard Max. [employer] AAR - based on medical necessity No Hard Max  Total # of Visits Approved: 12  Total # of Visits to Date: 1  No Show: 0  Canceled Appointment: 0     Subjective  CC: Thoracic Pain   HPI: (12/10/2024) Patient is 74 year old female who presented with  chronic thoracic back pain. No trauma or isolated incident reported. Pain developed over the summer months while in [place]/walking was challenging. Progressively has gotten worse. Saw PCP for Medicare Annual Visit. Based on the clinical findings, a referral for outpatient physical therapy was provided to further address the physical impairments and activity limitations.    Past Medical History:   Diagnosis Date    Anginal pain (HCC)     hx of angina    Asthma     Bronchitis     Colitis, ulcerative (HCC)     Diabetes mellitus (HCC)     GERD (gastroesophageal reflux disease)     Hypercholesterolemia     Hyperlipidemia     Hypertension, essential, benign 2017    Meniere disease     Obesity (BMI 30.0-34.9) 2014    PONV (postoperative nausea and vomiting)     Ulcerative pancolitis without complication (HCC)     Vitamin D deficiency      Past Surgical History:   Procedure Laterality Date    COLONOSCOPY  2011    colitis in

## 2025-01-03 ENCOUNTER — HOSPITAL ENCOUNTER (OUTPATIENT)
Dept: PHYSICAL THERAPY | Age: 75
Setting detail: THERAPIES SERIES
Discharge: HOME | End: 2025-01-03
Attending: FAMILY MEDICINE
Payer: MEDICARE

## 2025-01-03 PROCEDURE — 97110 THERAPEUTIC EXERCISES: CPT

## 2025-01-03 NOTE — FLOWSHEET NOTE
[facility]   Outpatient Rehabilitation & Therapy  [address]  P: [phone]   F: [phone]    Physical Therapy Daily Treatment Note      Date:  1/3/2025  Patient Name:  Patsy Ordonez    :  1950  MRN: [id number]  Physician: Darlyn Mercer MD                                    Medical Diagnosis: Back strain, initial encounter (S39.012A)           Clinical Diagnosis: Thoracic Pain (M54.6) with Muscle Weakness (M62.81)  Onset date: 12/10/2024           Next Dr's appt.: TBD  PT Visit Information  PT Insurance Information: Medicare Railroad - follow Medicare guidelines. No Hard Max. [employer] AAR - based on medical necessity No Hard Max  Total # of Visits Approved: 12  Total # of Visits to Date: 2  No Show: 0  Canceled Appointment: 0    Subjective:    Patient stated that she has been \"resting\" her muscles since her evaluation and has not noticed any increase in her pain levels. Overall, she feels well rested without any complaints of pain upon arrival.     Over 24 hours   Pain  Pain:  [x] Yes   [] No       Location: Posterior Shoulder(s) and (B) scapula(s)   Pain Rating: (0-10 scale) 0/10 without pain medication   Worst: 5/10 without pain medication   Best: 0/10 without pain medication   Descriptors: \"intermittent\" aching, burning    Objective:  Modalities:   Precautions: angina, asthma, DM, Meniere Disease - unable to lay fully supine   Exercises:  Exercise Reps/ Time Weight/ Level Comments   Seated Thoracic Extension  10 reps      Seated Isometric Cervical Flexion, Extension, (R) Side Bend, (L) Side Bend  2 sec hold x 10 reps   With each direction    Seated Shrugs  10 reps      Semi -reclined (B) Scapular Protraction  10 reps      Semi-reclined Scapular Depression  10 reps      Seated (B) Scapular Retraction with ER  10 reps      Seated Shoulder ER  5 reps x 2 sets   With each side    Seated Shoulder IR  5 reps x 2 sets   With each side    Standing Shoulder Abduction

## 2025-01-07 ENCOUNTER — HOSPITAL ENCOUNTER (OUTPATIENT)
Dept: PHYSICAL THERAPY | Age: 75
Setting detail: THERAPIES SERIES
Discharge: HOME | End: 2025-01-07
Attending: FAMILY MEDICINE
Payer: MEDICARE

## 2025-01-07 PROCEDURE — 97110 THERAPEUTIC EXERCISES: CPT

## 2025-01-07 NOTE — FLOWSHEET NOTE
Seated (B) Shoulder Scaption  10 reps  x 2 sets     Seated (B) Shoulder Flexion 10 reps x 2 sets     TB Rows/Ext 10 reps Red          Passive Stretching   Seated Rear Deltoid Stretch 30 sec hold x 3 reps  Standing (R) shoulder IR stretch with belt 30 sec hold x 3 reps     Specific Instructions for next treatment:  Monitor SXs and progress with therapeutic exercise to include passive stretching until the tightness/motion is resolved. Continue to progress with open chain strengthening using isometric, gravity neutral positions, gravity resisted positions and/or external resistance.     Assessment:   Completed therapeutic exercise program to include passive stretching. Open chain strengthening exercises were performed for the all planes of the neck, scapulothoracic and GH joint(s). Exercises must be completed standing or seated preferable due to SXs from Meniere Disease.  Added seated (B) shoulder flexion and standing TB Rows/Ext with Red TB 10 reps ea.  Good tolerance to activity without increased pain level.  [x] Progressing toward goals.    [] No change.     [] Other:    [] Patient would continue to benefit from skilled physical therapy services in order to improve ROM and strength, reduce the pain and activity limitations that are currently present.     Goals  STG: (to be met in 6 treatments)  1. Pt will report an average pain level of  0-3/10 within the cervical/thoracic region at rest/ADLs.  2. Pt will demonstrate improved PROM within all involved planes to assist with (I) function.      LTG: (to be met in 12 treatments)  1. Pt will report an average pain level of  0-1/10 within the cervical/thoracic region at rest/ADLs.  2. Pt will demonstrate full PROM within all involved planes to assist with (I) function.   3. Modified Oswestry Low Back Pain Disability Index by 10% (MDC).   4. Pt will demonstrate independence with her home exercise program at discharge.   5. Pt will demonstrate improved strength within all

## 2025-01-09 ENCOUNTER — HOSPITAL ENCOUNTER (OUTPATIENT)
Dept: PHYSICAL THERAPY | Age: 75
Setting detail: THERAPIES SERIES
Discharge: HOME | End: 2025-01-09
Attending: FAMILY MEDICINE
Payer: MEDICARE

## 2025-01-09 PROCEDURE — 97110 THERAPEUTIC EXERCISES: CPT

## 2025-01-09 NOTE — FLOWSHEET NOTE
[facility]   Outpatient Rehabilitation & Therapy  [address]  P: [phone]   F: [phone]    Physical Therapy Daily Treatment Note      Date:  2025  Patient Name:  Patsy Ordonez    :  1950  MRN: [id number]  Physician: Darlyn Mercer MD                                    Medical Diagnosis: Back strain, initial encounter (S39.012A)           Clinical Diagnosis: Thoracic Pain (M54.6) with Muscle Weakness (M62.81)  Onset date: 12/10/2024           Next Dr's appt.: TBD  PT Visit Information  PT Insurance Information: Medicare Railroad - follow Medicare guidelines. No Hard Max. [employer] AAR - based on medical necessity No Hard Max  Total # of Visits Approved: 12  Total # of Visits to Date: 4  No Show: 0  Canceled Appointment: 0    Subjective:    Patient reported frustration over weakness and inability to stand, walk, or perform standing activity for more than 30 min before weakness and back ache becomes present.    Over 24 hours   Pain  Pain:  [x] Yes   [] No       Location: Posterior Shoulder(s) and (B) scapula(s)   Pain Rating: (0-10 scale) 0/10 without pain medication   Worst: 5/10 without pain medication   Best: 0/10 without pain medication   Descriptors: \"intermittent\" aching, burning    Objective:  Modalities:   Precautions: angina, asthma, DM, Meniere Disease - unable to lay fully supine   Exercises:  Exercise Reps/ Time Weight/ Level Comments   Seated Thoracic Extension  10 reps      Seated Isometric Cervical Flexion, Extension, (R) Side Bend, (L) Side Bend  2 sec hold x 10 reps   With each direction    Seated Shrugs  10 reps      Semi -reclined (B) Scapular Protraction  10 reps      Semi-reclined Scapular Depression  10 reps      Seated (B) Scapular Retraction with ER  10 reps      Seated Shoulder ER  5 reps x 2 sets   With each side    Seated Shoulder IR  5 reps x 2 sets   With each side    Standing Shoulder Abduction  10 reps      Seated (B) Shoulder

## 2025-01-13 ENCOUNTER — HOSPITAL ENCOUNTER (OUTPATIENT)
Dept: PHYSICAL THERAPY | Age: 75
Setting detail: THERAPIES SERIES
Discharge: HOME | End: 2025-01-13
Attending: FAMILY MEDICINE
Payer: MEDICARE

## 2025-01-13 PROCEDURE — 97110 THERAPEUTIC EXERCISES: CPT

## 2025-01-15 ENCOUNTER — HOSPITAL ENCOUNTER (OUTPATIENT)
Dept: PHYSICAL THERAPY | Age: 75
Setting detail: THERAPIES SERIES
Discharge: HOME | End: 2025-01-15
Attending: FAMILY MEDICINE
Payer: MEDICARE

## 2025-01-15 PROCEDURE — 97110 THERAPEUTIC EXERCISES: CPT

## 2025-01-15 NOTE — THERAPY EVALUATION
physical impairments and activity limitations that are still present.     Today’s Treatment:   Modalities:   Precautions: angina, asthma, DM, Meniere Disease - unable to lay fully supine   Exercises:  Exercise Reps/ Time Weight/ Level Comments   Seated Thoracic Extension  10 reps x 2 sets   2 lb bar      Seated Isometric Cervical Flexion, Extension, (R) Side Bend, (L) Side Bend  2 sec hold x 10 reps       Seated Shrugs  20 reps        Semi -reclined (B) Scapular Protraction  20 reps  2#     Semi-reclined Scapular Depression  20 reps       Seated (B) Scapular Retraction with ER  15 reps        Standing Shoulder Abduction  15 reps        Seated (B) Shoulder Scaption  10 reps  x 2 sets 2#     Seated (B) Shoulder Flexion 10 reps x 2 sets 1#     STAR Pattern with TB 5x ea Yellow     TB Rows/Ext 15 reps Red     TB Horiz Abd 10 reps Yellow     TB ER/IR 10 reps Yellow     Passive Stretching   Standing (R) shoulder IR stretch with belt 30 sec hold x 3 reps   Seated (R) Upper Trap Stretch 30 sec hold x 3 reps   Seated (R) Levator Scapulae 30 sec hold x 3 reps     Specific Instructions for next treatment: Continue with therapeutic exercise to include passive stretching until the tightness is resolved. Continue to progress with open chain exercises for the cervical, scapulothoracic and GH muscles using gravity resisted positions and/or external resistance.     Treatment Charges: Mins Units   [] Evaluation       []  Low       []  Moderate       []  High     []  Modalities     [x]  Ther Exercise 40 3   []  Manual Therapy     []  Ther Activities     []  Aquatics     []  Neuromuscular     []  Gait Training     []  Dry Needling           1-2 muscles     []  Dry Needling           3 or more          muscles     [] Vasocompression     [x]  Other  Re-Evaluation  5 0     TOTAL TREATMENT TIME: 45    Time in: 0830  Time Out: 0915    Electronically signed by: Patrick Banegas PT DPT      Physician

## 2025-01-20 ENCOUNTER — HOSPITAL ENCOUNTER (OUTPATIENT)
Dept: PHYSICAL THERAPY | Age: 75
Setting detail: THERAPIES SERIES
Discharge: HOME | End: 2025-01-20
Attending: FAMILY MEDICINE
Payer: MEDICARE

## 2025-01-20 PROCEDURE — 97110 THERAPEUTIC EXERCISES: CPT

## 2025-01-20 NOTE — FLOWSHEET NOTE
[facility]   Outpatient Rehabilitation & Therapy  [address]  P: [phone]   F: [phone]    Physical Therapy Daily Treatment Note      Date:  2025  Patient Name:  Patsy Ordonez    :  1950  MRN: [id number]  Physician: Darlyn Mercer MD                                    Medical Diagnosis: Back strain, initial encounter (S39.012A)           Clinical Diagnosis: Thoracic Pain (M54.6) with Muscle Weakness (M62.81)  Onset date: 12/10/2024           Next Dr's appt.: 3/6/25 PCP Dr Mercer  PT Visit Information  PT Insurance Information: Medicare Railroad - follow Medicare guidelines. No Hard Max. [employer] - based on medical necessity No Hard Max  Total # of Visits Approved: 12  Total # of Visits to Date: 7  No Show: 0  Canceled Appointment: 0    Subjective:    Patient reported greater ability to perform ADLs and household chores for longer periods and decreased pain.    Over 24 hours   Pain  Pain:  [x] Yes   [] No       Location: Posterior Shoulder(s) and (B) scapula(s)   Pain Rating: (0-10 scale) 1/10 without pain medication   Worst: 5/10 without pain medication   Best: 0/10 without pain medication   Descriptors: \"intermittent\" aching, burning    Objective:  Modalities:   Precautions: angina, asthma, DM, Meniere Disease - unable to lay fully supine   Exercises:  Exercise Reps/ Time Weight/ Level Completed Comments   UBE 2'/2'  x    Seated Thoracic Extension  10 reps 10\"  x    Seated Isometric Cervical Flexion, Extension, (R) Side Bend, (L) Side Bend  2 sec hold x 10 reps    HER   Seated Shrugs  20 reps x2 sets 2# x    Semi -reclined (B) Scapular Protraction  20 reps x 2 sets 2# x    Semi-reclined Scapular Depression  20 reps x 2 sets 2# x    Seated (B) Scapular Retraction with ER  15 reps   x    Seated Shoulder ER  5 reps x 2 sets    HEP   Seated Shoulder IR  5 reps x 2 sets    HEP   Standing Shoulder Abduction  15 reps x 2 sets 1# x    Seated (B) Shoulder

## 2025-01-24 ENCOUNTER — HOSPITAL ENCOUNTER (OUTPATIENT)
Dept: PHYSICAL THERAPY | Age: 75
Setting detail: THERAPIES SERIES
Discharge: HOME | End: 2025-01-24
Attending: FAMILY MEDICINE
Payer: MEDICARE

## 2025-01-24 PROCEDURE — 97110 THERAPEUTIC EXERCISES: CPT

## 2025-01-24 NOTE — FLOWSHEET NOTE
1 x daily - 7 x weekly - 1 sets - 3 reps - 20 hold    Plan: [x] Continue per plan of care.   [] Other:      Treatment Charges: Mins Units   []  Modalities     [x]  Ther Exercise 42 3   []  Manual Therapy     []  Ther Activities     []  Aquatics     []  Neuromuscular     [] Vasocompression     [] Gait Training     [] Dry needling        [] 1 or 2 muscles        [] 3 or more muscles     []  Other     Total Billable time 42 3     Time In: 7:30 am            Time Out: 8:12 am    Electronically signed by:  Alex Holden PTA

## 2025-01-27 ENCOUNTER — HOSPITAL ENCOUNTER (OUTPATIENT)
Dept: PHYSICAL THERAPY | Age: 75
Setting detail: THERAPIES SERIES
Discharge: HOME | End: 2025-01-27
Attending: FAMILY MEDICINE
Payer: MEDICARE

## 2025-01-27 PROCEDURE — 97110 THERAPEUTIC EXERCISES: CPT

## 2025-01-27 NOTE — FLOWSHEET NOTE
[facility]   Outpatient Rehabilitation & Therapy  [address]  P: [phone]   F: [phone]    Physical Therapy Daily Treatment Note      Date:  2025  Patient Name:  Patsy Ordonez    :  1950  MRN: [id number]  Physician: Darlyn Mercer MD                                    Medical Diagnosis: Back strain, initial encounter (S39.012A)           Clinical Diagnosis: Thoracic Pain (M54.6) with Muscle Weakness (M62.81)  Onset date: 12/10/2024           Next Dr's appt.: 3/6/25 PCP Dr Mercer  PT Visit Information  PT Insurance Information: Medicare Railroad - follow Medicare guidelines. No Hard Max. [employer] AAR - based on medical necessity No Hard Max  Total # of Visits Approved: 12  Total # of Visits to Date: 9  No Show: 0  Canceled Appointment: 0    Subjective:    Patient reported improved strength to perform ADLs and washing dishes over weekend.  Stated \"still not 100% but pleased with her gain in strength.      Over 24 hours   Pain  Pain:  [x] Yes   [] No       Location: Posterior Shoulder(s) and (B) scapula(s)   Pain Rating: (0-10 scale) 1/10 without pain medication   Worst: 5/10 without pain medication   Best: 0/10 without pain medication   Descriptors: \"intermittent\" aching, burning    Objective:  Modalities:   Precautions: angina, asthma, DM, Meniere Disease - unable to lay fully supine   Exercises:  Exercise Reps/ Time Weight/ Level Completed Comments   UBE 2'/2'  x    Seated Thoracic Extension  10 reps 10\"  x    Seated Isometric Cervical Flexion, Extension, (R) Side Bend, (L) Side Bend  2 sec hold x 10 reps    HEP   Seated Shrugs  20 reps x2 sets 2# x    Semi -reclined (B) Scapular Protraction  20 reps x 2 sets 5# bar x Inc wt    Semi-reclined Scapular Depression  20 reps x 2 sets 5# bar x Inc wt    Semi-reclined Felxion 20 reps x 2 5# bar x    Seated (B) Scapular Retraction with ER  12 reps x 2 sets Yellow x Inc resistance    Seated Shoulder ER

## 2025-01-29 ENCOUNTER — HOSPITAL ENCOUNTER (OUTPATIENT)
Dept: PHYSICAL THERAPY | Age: 75
Setting detail: THERAPIES SERIES
Discharge: HOME | End: 2025-01-29
Attending: FAMILY MEDICINE
Payer: MEDICARE

## 2025-01-29 PROCEDURE — 97110 THERAPEUTIC EXERCISES: CPT

## 2025-01-29 NOTE — FLOWSHEET NOTE
reps x 2 sets Yellow x Inc resistance 1/27   Seated Shoulder ER  5 reps x 2 sets    HEP   Seated Shoulder IR  5 reps x 2 sets    HEP   Standing Shoulder Abduction  15 reps x 2 sets 1# x    Seated (B) Shoulder Scaption  15 reps  x 2 sets 2# x    Seated (B) Shoulder Flexion 15 reps x 2 sets 1# x    STAR Pattern with TB 5x ea Yellow x    TB Rows/Ext 20 reps x 2 sets Red x    TB Horiz Abd 12 reps x 2 sets Yellow x    TB ER/IR 15 reps  Yellow x    Passive Stretching   Standing (R) shoulder IR stretch with belt 30 sec hold x 3 reps   Seated (R) Upper Trap Stretch 30 sec hold x 3 reps   Seated (R) Levator Scapulae 30 sec hold x 3 reps    Specific Instructions for next treatment:      Monitor muscle soreness and progress with therapeutic exercise to include passive stretching until the tightness/motion is resolved. Continue to progress with open chain strengthening using isometric, gravity neutral positions, gravity resisted positions and/or external resistance.     Assessment:   Completed therapeutic exercise program as noted maintained level of PREs as has mild muscle soreness after therapy visits.  Patient required cues for education and guidance to return demo standing exercise tech but is becoming more independent. Overall pleased with program and targeting specific strengthening scapular thoracic back and shoulders. Patient good challenge with TB star pattern strengthening exercise. Finished with seated Upper Trap, Levator Scapulae and standing IR belt stretch 3x30\"  Plans to finish 2 remaining visits.  [x] Progressing toward goals.    [] No change.     [] Other:    [] Patient would continue to benefit from skilled physical therapy services in order to improve ROM and strength, reduce the pain and activity limitations that are currently present.     Goals  STG: (to be met in 6 treatments)  1. Pt will report an average pain level of  0-3/10 within the cervical/thoracic region at rest/ADLs. (Not Met)  2. Pt will

## 2025-02-03 ENCOUNTER — HOSPITAL ENCOUNTER (OUTPATIENT)
Dept: PHYSICAL THERAPY | Age: 75
Setting detail: THERAPIES SERIES
Discharge: HOME | End: 2025-02-03
Attending: FAMILY MEDICINE
Payer: MEDICARE

## 2025-02-03 PROCEDURE — 97110 THERAPEUTIC EXERCISES: CPT

## 2025-02-03 NOTE — FLOWSHEET NOTE
will demonstrate improved PROM within all involved planes to assist with (I) function. (Met)     LTG: (to be met in 12 treatments)  1. Pt will report an average pain level of  0-1/10 within the cervical/thoracic region at rest/ADLs.  2. Pt will demonstrate full PROM within all involved planes to assist with (I) function.   3. Modified Oswestry Low Back Pain Disability Index by 10% (MDC).   4. Pt will demonstrate independence with her home exercise program at discharge.   5. Pt will demonstrate improved strength within all involved motions to assist with (I) function.      Patient goals: Pain free, stronger back and shoulders     Pt. Education:  [] Yes  [x] No  [] Reviewed Prior HEP/Ed  Method of Education: [] Verbal  [] Demo  [] Written  Comprehension of Education:  [] Verbalizes understanding.  [] Demonstrates understanding.  [] Needs review.  [] Demonstrates/verbalizes HEP/Ed previously given.     Access Code: [email or web address]  URL: [email or web address]  Date: 01/09/2025  Prepared by: Alex Holden    Exercises  - Seated Scapular Retraction  - 1 x daily - 7 x weekly - 3 sets - 10 reps  - Shoulder External Rotation and Scapular Retraction with Resistance  - 1 x daily - 7 x weekly - 3 sets - 10 reps  - Standing Row with Anchored Resistance Band with PLB  - 1 x daily - 7 x weekly - 3 sets - 10 reps  - Shoulder extension with resistance - Neutral  - 1 x daily - 7 x weekly - 3 sets - 10 reps  - Standing Shoulder Horizontal Abduction with Anchored Resistance  - 1 x daily - 7 x weekly - 3 sets - 10 reps  - Shoulder Internal Rotation with Resistance  - 1 x daily - 7 x weekly - 3 sets - 10 reps  - Shoulder External Rotation with Anchored Resistance with Towel Under Elbow  - 1 x daily - 7 x weekly - 3 sets - 10 reps  - Standing Shoulder Posterior Capsule Stretch  - 1 x daily - 7 x weekly - 1 sets - 3 reps - 20 hold  - Seated Upper Trapezius Stretch  - 1 x daily - 7 x weekly - 1 sets - 3 reps - 20 hold  - Seated Levator

## 2025-02-05 ENCOUNTER — APPOINTMENT (OUTPATIENT)
Dept: PHYSICAL THERAPY | Age: 75
End: 2025-02-05
Attending: FAMILY MEDICINE
Payer: MEDICARE

## 2025-02-07 ENCOUNTER — APPOINTMENT (OUTPATIENT)
Dept: PHYSICAL THERAPY | Age: 75
End: 2025-02-07
Attending: FAMILY MEDICINE
Payer: MEDICARE

## 2025-02-12 ENCOUNTER — HOSPITAL ENCOUNTER (OUTPATIENT)
Dept: PHYSICAL THERAPY | Age: 75
Setting detail: THERAPIES SERIES
Discharge: HOME | End: 2025-02-12
Attending: FAMILY MEDICINE
Payer: MEDICARE

## 2025-02-12 PROCEDURE — 97110 THERAPEUTIC EXERCISES: CPT

## 2025-02-12 NOTE — THERAPY DISCHARGE
[] [facility]   Outpatient Rehabilitation & Therapy  [address]  P: [phone]   F: [phone]  [facility] Outpatient Physical Therapy  [address]         Phone: [phone]       Fax: [phone]    Physical Therapy Progress Note Update/Discharge Summary     Date:  2025  Patient: Patsy Ordonez  : 1950  MRN: [id number]  Physician: Darlyn Mercer MD     Medical Diagnosis: Back strain, initial encounter (S39.012A)   Clinical Diagnosis: Thoracic Pain (M54.6) with Muscle Weakness (M62.81)  Onset date: 12/10/2024   Next Dr's appt.: TBD  PT Visit Information  PT Insurance Information: Medicare Railroad - follow Medicare guidelines. No Hard Max. [employer] A[employer] - based on medical necessity No Hard Max  Total # of Visits Approved: 12  Total # of Visits to Date: 12  No Show: 0  Canceled Appointment: 0     Subjective  Patient stated that she felt the treatment sessions have helped. She is now able to perform many more of her desired ADLs for longer periods of time.     Over 24 hours   Pain  Pain:  [x] Yes   [] No      Location: Posterior Shoulder(s) and (B) scapula(s)   Pain Rating: (0-10 scale) 0/10 without pain medication   Worst: 2/10 without pain medication   Best: 0/10 without pain medication   Symptoms:  [x] Improved [] Worsening       [] Same  Descriptors: \"intermittent\" aching, burning  Aggravating factors: ADLs that involve standing, lifting/carrying, pushing/pulling and prolonged sitting  Relieving factors: Rest, repositioning   Sleep: Not Disturbed   Other: Able to perform her ADLs for longer periods of time.     ADL/IADL Previous level of function Current level of function Who currently assists the patient with task   Bathing  [x] Independent  [] Assist [x] Independent  [] Assist    Dress/grooming [x] Independent  [] Assist [x] Independent  [] Assist    Transfer/mobility [x] Independent  [] Assist [x] Independent  []

## 2025-04-29 ENCOUNTER — HOSPITAL ENCOUNTER (OUTPATIENT)
Age: 75
Setting detail: SPECIMEN
Discharge: HOME | End: 2025-04-29

## 2025-04-29 DIAGNOSIS — E78.2 MIXED HYPERLIPIDEMIA: ICD-10-CM

## 2025-04-29 DIAGNOSIS — E11.9 TYPE 2 DIABETES MELLITUS WITHOUT COMPLICATION, WITHOUT LONG-TERM CURRENT USE OF INSULIN (HCC): ICD-10-CM

## 2025-04-29 DIAGNOSIS — I10 ESSENTIAL HYPERTENSION: ICD-10-CM

## 2025-04-29 LAB
ALBUMIN SERPL-MCNC: 3.9 G/DL (ref 3.5–5.2)
ALBUMIN/GLOB SERPL: 1.6 {RATIO} (ref 1–2.5)
ALP SERPL-CCNC: 79 U/L (ref 35–104)
ALT SERPL-CCNC: 9 U/L (ref 10–35)
ANION GAP SERPL CALCULATED.3IONS-SCNC: 9 MMOL/L (ref 9–16)
AST SERPL-CCNC: 19 U/L (ref 10–35)
BILIRUB SERPL-MCNC: 0.7 MG/DL (ref 0–1.2)
BUN SERPL-MCNC: 15 MG/DL (ref 8–23)
CALCIUM SERPL-MCNC: 9.4 MG/DL (ref 8.6–10.4)
CHLORIDE SERPL-SCNC: 106 MMOL/L (ref 98–107)
CO2 SERPL-SCNC: 26 MMOL/L (ref 20–31)
CREAT SERPL-MCNC: 0.7 MG/DL (ref 0.6–0.9)
CREAT UR-MCNC: 174 MG/DL (ref 28–217)
ERYTHROCYTE [DISTWIDTH] IN BLOOD BY AUTOMATED COUNT: 12.5 % (ref 11.8–14.4)
GFR, ESTIMATED: >90 ML/MIN/1.73M2
GLUCOSE SERPL-MCNC: 71 MG/DL (ref 74–99)
HCT VFR BLD AUTO: 41.7 % (ref 36.3–47.1)
HGB BLD-MCNC: 13.2 G/DL (ref 11.9–15.1)
MCH RBC QN AUTO: 28.9 PG (ref 25.2–33.5)
MCHC RBC AUTO-ENTMCNC: 31.7 G/DL (ref 28.4–34.8)
MCV RBC AUTO: 91.2 FL (ref 82.6–102.9)
MICROALBUMIN UR-MCNC: <12 MG/L (ref 0–20)
MICROALBUMIN/CREAT UR-RTO: NORMAL MCG/MG CREAT (ref 0–25)
NRBC BLD-RTO: 0 PER 100 WBC
PLATELET # BLD AUTO: 237 K/UL (ref 138–453)
PMV BLD AUTO: 10.8 FL (ref 8.1–13.5)
POTASSIUM SERPL-SCNC: 4.1 MMOL/L (ref 3.7–5.3)
PROT SERPL-MCNC: 6.4 G/DL (ref 6.6–8.7)
RBC # BLD AUTO: 4.57 M/UL (ref 3.95–5.11)
SODIUM SERPL-SCNC: 141 MMOL/L (ref 136–145)
WBC OTHER # BLD: 6.7 K/UL (ref 3.5–11.3)

## 2025-06-27 RX ORDER — BALSALAZIDE DISODIUM 750 MG/1
1500 CAPSULE ORAL DAILY
Qty: 180 CAPSULE | Refills: 3 | OUTPATIENT
Start: 2025-06-27

## 2025-06-28 DIAGNOSIS — J45.30 MILD PERSISTENT ASTHMA WITHOUT COMPLICATION: ICD-10-CM

## 2025-06-30 RX ORDER — MONTELUKAST SODIUM 10 MG/1
10 TABLET ORAL NIGHTLY
Qty: 90 TABLET | Refills: 0 | Status: SHIPPED | OUTPATIENT
Start: 2025-06-30

## 2025-06-30 NOTE — TELEPHONE ENCOUNTER
LAST VISIT: 8/27/24 with LEIDY Mckeon  NEXT VISIT: 8/26/25 with Dr Miles    Per last dictation patient is on this medication. Please sign for refill if ok. Thank you.

## 2025-07-23 PROBLEM — R09.89: Status: ACTIVE | Noted: 2021-11-11

## 2025-07-23 PROBLEM — J98.11 PULMONARY ATELECTASIS: Status: ACTIVE | Noted: 2021-11-11

## 2025-07-23 PROBLEM — I45.10 RIGHT BUNDLE BRANCH BLOCK: Status: ACTIVE | Noted: 2017-11-28

## 2025-07-23 RX ORDER — PREDNISONE 10 MG/1
TABLET ORAL
COMMUNITY
Start: 2025-05-27

## 2025-07-23 RX ORDER — AZITHROMYCIN 250 MG/1
TABLET, FILM COATED ORAL
COMMUNITY
Start: 2025-05-27

## 2025-07-24 ENCOUNTER — OFFICE VISIT (OUTPATIENT)
Dept: GASTROENTEROLOGY | Age: 75
End: 2025-07-24
Payer: MEDICARE

## 2025-07-24 VITALS
HEART RATE: 73 BPM | SYSTOLIC BLOOD PRESSURE: 160 MMHG | OXYGEN SATURATION: 96 % | DIASTOLIC BLOOD PRESSURE: 82 MMHG | WEIGHT: 136 LBS | BODY MASS INDEX: 25.68 KG/M2 | HEIGHT: 61 IN

## 2025-07-24 DIAGNOSIS — K51.919 ULCERATIVE COLITIS WITH COMPLICATION, UNSPECIFIED LOCATION (HCC): Primary | ICD-10-CM

## 2025-07-24 PROCEDURE — 1090F PRES/ABSN URINE INCON ASSESS: CPT | Performed by: STUDENT IN AN ORGANIZED HEALTH CARE EDUCATION/TRAINING PROGRAM

## 2025-07-24 PROCEDURE — 1159F MED LIST DOCD IN RCRD: CPT | Performed by: STUDENT IN AN ORGANIZED HEALTH CARE EDUCATION/TRAINING PROGRAM

## 2025-07-24 PROCEDURE — 3077F SYST BP >= 140 MM HG: CPT | Performed by: STUDENT IN AN ORGANIZED HEALTH CARE EDUCATION/TRAINING PROGRAM

## 2025-07-24 PROCEDURE — 1126F AMNT PAIN NOTED NONE PRSNT: CPT | Performed by: STUDENT IN AN ORGANIZED HEALTH CARE EDUCATION/TRAINING PROGRAM

## 2025-07-24 PROCEDURE — 3017F COLORECTAL CA SCREEN DOC REV: CPT | Performed by: STUDENT IN AN ORGANIZED HEALTH CARE EDUCATION/TRAINING PROGRAM

## 2025-07-24 PROCEDURE — G8427 DOCREV CUR MEDS BY ELIG CLIN: HCPCS | Performed by: STUDENT IN AN ORGANIZED HEALTH CARE EDUCATION/TRAINING PROGRAM

## 2025-07-24 PROCEDURE — 1036F TOBACCO NON-USER: CPT | Performed by: STUDENT IN AN ORGANIZED HEALTH CARE EDUCATION/TRAINING PROGRAM

## 2025-07-24 PROCEDURE — 1123F ACP DISCUSS/DSCN MKR DOCD: CPT | Performed by: STUDENT IN AN ORGANIZED HEALTH CARE EDUCATION/TRAINING PROGRAM

## 2025-07-24 PROCEDURE — G8417 CALC BMI ABV UP PARAM F/U: HCPCS | Performed by: STUDENT IN AN ORGANIZED HEALTH CARE EDUCATION/TRAINING PROGRAM

## 2025-07-24 PROCEDURE — G8400 PT W/DXA NO RESULTS DOC: HCPCS | Performed by: STUDENT IN AN ORGANIZED HEALTH CARE EDUCATION/TRAINING PROGRAM

## 2025-07-24 PROCEDURE — 3079F DIAST BP 80-89 MM HG: CPT | Performed by: STUDENT IN AN ORGANIZED HEALTH CARE EDUCATION/TRAINING PROGRAM

## 2025-07-24 PROCEDURE — 99214 OFFICE O/P EST MOD 30 MIN: CPT | Performed by: STUDENT IN AN ORGANIZED HEALTH CARE EDUCATION/TRAINING PROGRAM

## 2025-07-24 RX ORDER — BALSALAZIDE DISODIUM 750 MG/1
1500 CAPSULE ORAL DAILY
Qty: 60 CAPSULE | Refills: 5 | Status: SHIPPED | OUTPATIENT
Start: 2025-07-24 | End: 2026-07-19

## 2025-07-24 NOTE — PROGRESS NOTES
dysuria, no flank pain.  Extremities:  No swelling or joint pains.  ROS was otherwise negative    PHYSICAL EXAMINATION:   BP (!) 160/82 Comment: just took bp med within the last hour  Pulse 73   Ht 1.549 m (5' 1\")   Wt 61.7 kg (136 lb)   SpO2 96%   BMI 25.70 kg/m²     Constitutional: Patient is oriented to person, place, and time. Patient appears well-developed and well-nourished.   HEENT: Normocephalic and atraumatic. EOM are normal. Normal range of motion. Neck supple, moist mucous membranes, no jaundice   Cardiovascular: Normal rate  Pulmonary/Chest: Effort normal and no respiratory distress. Equal bilateral chest rise.  Abdominal: Soft. Exhibits no distension and no mass. There is no tenderness. There is no rebound and no guarding.  Musculoskeletal: Normal range of motion.   Neurological: Patient is alert and oriented to person, place, and time. Motor grossly normal.   Psychiatric: Patient has a normal mood and affect. Patient behavior is normal.     LABORATORY DATA: Reviewed  Lab Results   Component Value Date    WBC 6.7 04/29/2025    HGB 13.2 04/29/2025    HCT 41.7 04/29/2025    MCV 91.2 04/29/2025     04/29/2025     04/29/2025    K 4.1 04/29/2025     04/29/2025    CO2 26 04/29/2025    BUN 15 04/29/2025    CREATININE 0.7 04/29/2025    BILITOT 0.7 04/29/2025    ALKPHOS 79 04/29/2025    AST 19 04/29/2025    ALT 9 (L) 04/29/2025       Lab Results   Component Value Date    RBC 4.57 04/29/2025    HGB 13.2 04/29/2025    MCV 91.2 04/29/2025    MCH 28.9 04/29/2025    MCHC 31.7 04/29/2025    RDW 12.5 04/29/2025    MPV 10.8 04/29/2025    BASOPCT 1 11/30/2022    LYMPHSABS 2.61 11/30/2022    MONOSABS 0.53 11/30/2022    NEUTROABS 4.50 11/30/2022    EOSABS 0 (L) 05/09/2023    BASOSABS 0.04 11/30/2022       IMPRESSION: Ms.Julia JEFERSON Ordonez is a very pleasant 75 y.o. female with a past history remarkable for left-sided ulcerative colitis, referred as new consultation for evaluation of left-sided

## 2025-08-18 ENCOUNTER — HOSPITAL ENCOUNTER (OUTPATIENT)
Age: 75
Setting detail: SPECIMEN
Discharge: HOME | End: 2025-08-18

## 2025-08-18 LAB
CHOLEST SERPL-MCNC: 157 MG/DL (ref 0–199)
CHOLESTEROL/HDL RATIO: 2.7
HDLC SERPL-MCNC: 58 MG/DL
LDLC SERPL CALC-MCNC: 79 MG/DL (ref 0–100)
TRIGL SERPL-MCNC: 98 MG/DL (ref 0–149)
VLDLC SERPL CALC-MCNC: 20 MG/DL (ref 1–30)

## 2025-08-22 ENCOUNTER — TELEPHONE (OUTPATIENT)
Dept: GASTROENTEROLOGY | Age: 75
End: 2025-08-22

## 2025-08-25 DIAGNOSIS — Z12.83 SKIN CANCER SCREENING: Primary | ICD-10-CM

## 2025-08-26 ENCOUNTER — OFFICE VISIT (OUTPATIENT)
Dept: PULMONOLOGY | Age: 75
End: 2025-08-26
Payer: MEDICARE

## 2025-08-26 VITALS
OXYGEN SATURATION: 98 % | WEIGHT: 137 LBS | SYSTOLIC BLOOD PRESSURE: 175 MMHG | BODY MASS INDEX: 25.86 KG/M2 | HEIGHT: 61 IN | HEART RATE: 43 BPM | DIASTOLIC BLOOD PRESSURE: 79 MMHG

## 2025-08-26 DIAGNOSIS — J30.1 CHRONIC SEASONAL ALLERGIC RHINITIS DUE TO POLLEN: ICD-10-CM

## 2025-08-26 DIAGNOSIS — J30.81 ALLERGY TO CATS: ICD-10-CM

## 2025-08-26 DIAGNOSIS — E66.3 OVERWEIGHT (BMI 25.0-29.9): ICD-10-CM

## 2025-08-26 DIAGNOSIS — Z86.16 HISTORY OF COVID-19: ICD-10-CM

## 2025-08-26 DIAGNOSIS — J32.8 OTHER CHRONIC SINUSITIS: ICD-10-CM

## 2025-08-26 DIAGNOSIS — J45.30 MILD PERSISTENT ASTHMA WITHOUT COMPLICATION: Primary | ICD-10-CM

## 2025-08-26 DIAGNOSIS — K21.9 GASTROESOPHAGEAL REFLUX DISEASE, UNSPECIFIED WHETHER ESOPHAGITIS PRESENT: ICD-10-CM

## 2025-08-26 DIAGNOSIS — R09.82 POST-NASAL DRIP: ICD-10-CM

## 2025-08-26 PROCEDURE — 1159F MED LIST DOCD IN RCRD: CPT | Performed by: INTERNAL MEDICINE

## 2025-08-26 PROCEDURE — 1090F PRES/ABSN URINE INCON ASSESS: CPT | Performed by: INTERNAL MEDICINE

## 2025-08-26 PROCEDURE — 3077F SYST BP >= 140 MM HG: CPT | Performed by: INTERNAL MEDICINE

## 2025-08-26 PROCEDURE — G8427 DOCREV CUR MEDS BY ELIG CLIN: HCPCS | Performed by: INTERNAL MEDICINE

## 2025-08-26 PROCEDURE — G8417 CALC BMI ABV UP PARAM F/U: HCPCS | Performed by: INTERNAL MEDICINE

## 2025-08-26 PROCEDURE — 3017F COLORECTAL CA SCREEN DOC REV: CPT | Performed by: INTERNAL MEDICINE

## 2025-08-26 PROCEDURE — 1123F ACP DISCUSS/DSCN MKR DOCD: CPT | Performed by: INTERNAL MEDICINE

## 2025-08-26 PROCEDURE — 99214 OFFICE O/P EST MOD 30 MIN: CPT | Performed by: INTERNAL MEDICINE

## 2025-08-26 PROCEDURE — G8400 PT W/DXA NO RESULTS DOC: HCPCS | Performed by: INTERNAL MEDICINE

## 2025-08-26 PROCEDURE — 3078F DIAST BP <80 MM HG: CPT | Performed by: INTERNAL MEDICINE

## 2025-08-26 PROCEDURE — 1036F TOBACCO NON-USER: CPT | Performed by: INTERNAL MEDICINE

## 2025-08-26 RX ORDER — ALBUTEROL SULFATE 90 UG/1
2 INHALANT RESPIRATORY (INHALATION) 4 TIMES DAILY PRN
Qty: 18 G | Refills: 5 | Status: SHIPPED | OUTPATIENT
Start: 2025-08-26

## 2025-08-29 ENCOUNTER — HOSPITAL ENCOUNTER (OUTPATIENT)
Age: 75
Setting detail: SPECIMEN
Discharge: HOME | End: 2025-08-29

## 2025-08-29 DIAGNOSIS — K51.919 ULCERATIVE COLITIS WITH COMPLICATION, UNSPECIFIED LOCATION (HCC): ICD-10-CM

## 2025-08-29 LAB
ALBUMIN SERPL-MCNC: 4 G/DL (ref 3.5–5.2)
ALBUMIN/GLOB SERPL: 1.7 {RATIO} (ref 1–2.5)
ALP SERPL-CCNC: 83 U/L (ref 35–104)
ALT SERPL-CCNC: 12 U/L (ref 10–35)
ANION GAP SERPL CALCULATED.3IONS-SCNC: 11 MMOL/L (ref 9–16)
AST SERPL-CCNC: 18 U/L (ref 10–35)
BASOPHILS # BLD: <0.03 K/UL (ref 0–0.2)
BASOPHILS NFR BLD: 0 % (ref 0–2)
BILIRUB DIRECT SERPL-MCNC: 0.3 MG/DL (ref 0–0.2)
BILIRUB INDIRECT SERPL-MCNC: 0.5 MG/DL (ref 0–1)
BILIRUB SERPL-MCNC: 0.8 MG/DL (ref 0–1.2)
BUN SERPL-MCNC: 13 MG/DL (ref 8–23)
CALCIUM SERPL-MCNC: 9.4 MG/DL (ref 8.6–10.4)
CHLORIDE SERPL-SCNC: 105 MMOL/L (ref 98–107)
CO2 SERPL-SCNC: 26 MMOL/L (ref 20–31)
CREAT SERPL-MCNC: 0.7 MG/DL (ref 0.6–0.9)
EOSINOPHIL # BLD: 0.05 K/UL (ref 0–0.44)
EOSINOPHILS RELATIVE PERCENT: 1 % (ref 1–4)
ERYTHROCYTE [DISTWIDTH] IN BLOOD BY AUTOMATED COUNT: 11.9 % (ref 11.8–14.4)
GFR, ESTIMATED: >90 ML/MIN/1.73M2
GLUCOSE SERPL-MCNC: 96 MG/DL (ref 74–99)
HCT VFR BLD AUTO: 41.6 % (ref 36.3–47.1)
HGB BLD-MCNC: 13.6 G/DL (ref 11.9–15.1)
IMM GRANULOCYTES # BLD AUTO: <0.03 K/UL (ref 0–0.3)
IMM GRANULOCYTES NFR BLD: 0 %
LYMPHOCYTES NFR BLD: 2.3 K/UL (ref 1.1–3.7)
LYMPHOCYTES RELATIVE PERCENT: 44 % (ref 24–43)
MCH RBC QN AUTO: 29.1 PG (ref 25.2–33.5)
MCHC RBC AUTO-ENTMCNC: 32.7 G/DL (ref 28.4–34.8)
MCV RBC AUTO: 89.1 FL (ref 82.6–102.9)
MONOCYTES NFR BLD: 0.42 K/UL (ref 0.1–1.2)
MONOCYTES NFR BLD: 8 % (ref 3–12)
NEUTROPHILS NFR BLD: 47 % (ref 36–65)
NEUTS SEG NFR BLD: 2.49 K/UL (ref 1.5–8.1)
NRBC BLD-RTO: 0 PER 100 WBC
PLATELET # BLD AUTO: 252 K/UL (ref 138–453)
PMV BLD AUTO: 10.8 FL (ref 8.1–13.5)
POTASSIUM SERPL-SCNC: 3.8 MMOL/L (ref 3.7–5.3)
PROT SERPL-MCNC: 6.4 G/DL (ref 6.6–8.7)
RBC # BLD AUTO: 4.67 M/UL (ref 3.95–5.11)
SODIUM SERPL-SCNC: 142 MMOL/L (ref 136–145)
WBC OTHER # BLD: 5.3 K/UL (ref 3.5–11.3)

## 2025-08-31 LAB — CALPROTECTIN, FECAL: 90 UG/G

## (undated) DEVICE — FORCEPS BX L240CM WRK CHN 2.8MM STD CAP W/ NDL MIC MESH

## (undated) DEVICE — JELLY,LUBE,STERILE,FLIP TOP,TUBE,2-OZ: Brand: MEDLINE

## (undated) DEVICE — GLOVE ORANGE PI 7   MSG9070

## (undated) DEVICE — GOWN,POLY REINFORCED,LG: Brand: MEDLINE

## (undated) DEVICE — ENDO KIT W/SYRINGE: Brand: MEDLINE INDUSTRIES, INC.

## (undated) DEVICE — DEFENDO AIR WATER SUCTION AND BIOPSY VALVE KIT FOR  OLYMPUS: Brand: DEFENDO AIR/WATER/SUCTION AND BIOPSY VALVE